# Patient Record
Sex: MALE | Race: WHITE | Employment: UNEMPLOYED | URBAN - METROPOLITAN AREA
[De-identification: names, ages, dates, MRNs, and addresses within clinical notes are randomized per-mention and may not be internally consistent; named-entity substitution may affect disease eponyms.]

---

## 2017-01-02 DIAGNOSIS — F41.1 GAD (GENERALIZED ANXIETY DISORDER): Primary | ICD-10-CM

## 2017-01-02 NOTE — Clinical Note
[facility]  [address] [facility]  [place] [address]  [phone]      January 3, 2017      Luisito Johnston  [address] [place] [id number]        Luisito     Your medication has been approved for FLUoxetine .    However, you are due for a follow up appointment for further refills. It has been over a year since we have seen you.    Please schedule this visit at your earliest convenience.    The [place] Team

## 2017-01-02 NOTE — TELEPHONE ENCOUNTER
Routing refill request to provider for review/approval because:  Drea given x1 and patient did not follow up, please advise  Patient needs to be seen because:  Needs to establish care.

## 2017-01-02 NOTE — TELEPHONE ENCOUNTER
Fluoxetine     Last Written Prescription Date: 12/5/16  Last Fill Quantity: 30, # refills: 0  [place] Office Visit with [facility] [place] provider:  11/4/15        Last PHQ-9 score on record=   PHQ-9 SCORE 10/17/2014   Total Score 0

## 2017-01-11 ENCOUNTER — OFFICE VISIT (OUTPATIENT)
Dept: FAMILY MEDICINE | Facility: CLINIC | Age: 52
End: 2017-01-11
Payer: COMMERCIAL

## 2017-01-11 VITALS
SYSTOLIC BLOOD PRESSURE: 124 MMHG | WEIGHT: 226.4 LBS | HEART RATE: 80 BPM | HEIGHT: 70 IN | DIASTOLIC BLOOD PRESSURE: 83 MMHG | TEMPERATURE: 98.5 F | BODY MASS INDEX: 32.41 KG/M2

## 2017-01-11 DIAGNOSIS — F41.1 GAD (GENERALIZED ANXIETY DISORDER): Primary | ICD-10-CM

## 2017-01-11 DIAGNOSIS — Z23 NEED FOR PROPHYLACTIC VACCINATION AND INOCULATION AGAINST INFLUENZA: ICD-10-CM

## 2017-01-11 DIAGNOSIS — Z11.59 NEED FOR HEPATITIS C SCREENING TEST: ICD-10-CM

## 2017-01-11 PROCEDURE — 99213 OFFICE O/P EST LOW 20 MIN: CPT | Mod: 25 | Performed by: FAMILY MEDICINE

## 2017-01-11 PROCEDURE — 36415 COLL VENOUS BLD VENIPUNCTURE: CPT | Performed by: FAMILY MEDICINE

## 2017-01-11 PROCEDURE — 86803 HEPATITIS C AB TEST: CPT | Performed by: FAMILY MEDICINE

## 2017-01-11 PROCEDURE — 90686 IIV4 VACC NO PRSV 0.5 ML IM: CPT | Performed by: FAMILY MEDICINE

## 2017-01-11 PROCEDURE — 90471 IMMUNIZATION ADMIN: CPT | Performed by: FAMILY MEDICINE

## 2017-01-11 ASSESSMENT — ANXIETY QUESTIONNAIRES
GAD7 TOTAL SCORE: 3
3. WORRYING TOO MUCH ABOUT DIFFERENT THINGS: SEVERAL DAYS
5. BEING SO RESTLESS THAT IT IS HARD TO SIT STILL: NOT AT ALL
6. BECOMING EASILY ANNOYED OR IRRITABLE: SEVERAL DAYS
IF YOU CHECKED OFF ANY PROBLEMS ON THIS QUESTIONNAIRE, HOW DIFFICULT HAVE THESE PROBLEMS MADE IT FOR YOU TO DO YOUR WORK, TAKE CARE OF THINGS AT HOME, OR GET ALONG WITH OTHER PEOPLE: NOT DIFFICULT AT ALL
2. NOT BEING ABLE TO STOP OR CONTROL WORRYING: NOT AT ALL
7. FEELING AFRAID AS IF SOMETHING AWFUL MIGHT HAPPEN: NOT AT ALL
1. FEELING NERVOUS, ANXIOUS, OR ON EDGE: SEVERAL DAYS

## 2017-01-11 ASSESSMENT — PATIENT HEALTH QUESTIONNAIRE - PHQ9: 5. POOR APPETITE OR OVEREATING: NOT AT ALL

## 2017-01-11 NOTE — PROGRESS NOTES
"SUBJECTIVE:  SUBJECTIVE:  51 year old.The patient has a history of  follow-up of depressive symptoms.    Since last visit the patient has doing well.  Notes from that visit reviewed. PHQ-9 has been reviewed.  Current symptoms include: Anxiety   Symptoms that have subjectively improved include: Anxiety  Previous and current treatment modalities employed include: Medications   Prozac (Fluoxetine)  Patient Active Problem List   Diagnosis     CARDIOVASCULAR SCREENING; LDL GOAL LESS THAN 160     Generalized anxiety disorder     Generalized anxiety disorder      Reviewed health maintenance  OBJECTIVE:  no apparent distress  /83 mmHg  Pulse 80  Temp(Src) 98.5  F (36.9  C) (Oral)  Ht 5' 10\" (1.778 m)  Wt 226 lb 6.4 oz (102.694 kg)  BMI 32.48 kg/m2       MENTAL STATUS EXAM:   1. Clinical observations:Patient was clean and was adequately groomed. Patient's emotional presentation was cooperative and sima/open. Patient spoke clearly and articulately. Patient maintained adequate eye contact and was cooperative in answering questions.  2. Patient appeared to be well-oriented in all spheres with coherent, logical, goal directed and relevent thinking.  3. Thought content: Denies auditory and visual hallucinations or delusions.  4. Affect and mood: Affect is alert and her emotional attitude is described as normal.  5. Sensorium and cognition: Patient was in contact with reality and oriented to place, time, person and situation. patient demonstrated no impairment in immediate, recent, or remote memory. patient's insight was adequate without obvious deficits in intelligence.    ICD-10-CM    1. MELIDA (generalized anxiety disorder) F41.1 FLUoxetine (PROZAC) 20 MG capsule   2. Need for prophylactic vaccination and inoculation against influenza Z23 FLU VAC, SPLIT VIRUS IM > 3 YO (QUADRIVALENT) [67491]     Vaccine Administration, Initial [13280]    PLAN:Follow up in 6 months     Join My Chart and in 6 months  Need PHQ-9 in 6 " months and if less than 5 then refill medication    :

## 2017-01-11 NOTE — PROGRESS NOTES
Injectable Influenza Immunization Documentation    1.  Is the person to be vaccinated sick today?  No    2. Does the person to be vaccinated have an allergy to eggs or to a component of the vaccine?  No    3. Has the person to be vaccinated today ever had a serious reaction to influenza vaccine in the past?  No    4. Has the person to be vaccinated ever had Guillain-[place] syndrome?  No     Form completed by Slade Aragon, [facility]

## 2017-01-11 NOTE — Clinical Note
[facility]  [address] [facility] [address] [phone]        January 12, 2017    Luisito Johnston  [facility] [place] [phone]            Dear Luisito,    The results of your recent tests were normal.  Below is a copy of the results.  It was a pleasure to see you at your last appointment.    If you have any questions or concerns, please call myself or my nurse at [phone].    Sincerely,    Red Trejo MD/ayaka      Results for orders placed or performed in visit on 01/11/17   Hepatitis C Screen Reflex to HCV RNA Quant and Genotype   Result Value Ref Range    Hepatitis C Antibody  NR     Nonreactive   Assay performance characteristics have not been established for newborns,   infants, and children

## 2017-01-11 NOTE — NURSING NOTE
"Chief Complaint   Patient presents with     RECHECK     Anxiety       Initial /83 mmHg  Pulse 80  Temp(Src) 98.5  F (36.9  C) (Oral)  Ht 5' 10\" (1.778 m)  Wt 226 lb 6.4 oz (102.694 kg)  BMI 32.48 kg/m2 Estimated body mass index is 32.48 kg/(m^2) as calculated from the following:    Height as of this encounter: 5' 10\" (1.778 m).    Weight as of this encounter: 226 lb 6.4 oz (102.694 kg).  BP completed using cuff size: mildays Aragon CMA     "

## 2017-01-12 LAB — HCV AB SERPL QL IA: NORMAL

## 2017-01-12 ASSESSMENT — ANXIETY QUESTIONNAIRES: GAD7 TOTAL SCORE: 3

## 2017-01-12 ASSESSMENT — PATIENT HEALTH QUESTIONNAIRE - PHQ9: SUM OF ALL RESPONSES TO PHQ QUESTIONS 1-9: 4

## 2017-05-16 ENCOUNTER — RADIANT APPOINTMENT (OUTPATIENT)
Dept: GENERAL RADIOLOGY | Facility: CLINIC | Age: 52
End: 2017-05-16
Attending: ORTHOPAEDIC SURGERY
Payer: COMMERCIAL

## 2017-05-16 ENCOUNTER — OFFICE VISIT (OUTPATIENT)
Dept: ORTHOPEDICS | Facility: CLINIC | Age: 52
End: 2017-05-16
Payer: COMMERCIAL

## 2017-05-16 VITALS
BODY MASS INDEX: 31.94 KG/M2 | SYSTOLIC BLOOD PRESSURE: 123 MMHG | HEART RATE: 93 BPM | OXYGEN SATURATION: 94 % | WEIGHT: 222.6 LBS | DIASTOLIC BLOOD PRESSURE: 80 MMHG

## 2017-05-16 DIAGNOSIS — S83.241A TEAR OF MEDIAL MENISCUS OF RIGHT KNEE, UNSPECIFIED TEAR TYPE, UNSPECIFIED WHETHER OLD OR CURRENT TEAR, INITIAL ENCOUNTER: ICD-10-CM

## 2017-05-16 DIAGNOSIS — M25.561 RIGHT MEDIAL KNEE PAIN: ICD-10-CM

## 2017-05-16 DIAGNOSIS — M25.561 RIGHT MEDIAL KNEE PAIN: Primary | ICD-10-CM

## 2017-05-16 PROCEDURE — 99203 OFFICE O/P NEW LOW 30 MIN: CPT | Performed by: ORTHOPAEDIC SURGERY

## 2017-05-16 PROCEDURE — 73562 X-RAY EXAM OF KNEE 3: CPT | Mod: RT

## 2017-05-16 NOTE — MR AVS SNAPSHOT
After Visit Summary   5/16/2017    Luisito Johnston    MRN: [id number]           Patient Information     Date Of Birth          1965        Visit Information        Provider Department      5/16/2017 10:45 AM [facility]        Today's Diagnoses     Right medial knee pain    -  1    Tear of medial meniscus of right knee, unspecified tear type, unspecified whether old or current tear, initial encounter          Care Instructions    Please remember to call and schedule a follow up appointment if one was recommended at your earliest convenience.   Orthopedics CLINIC HOURS TELEPHONE NUMBER   Jim Lemon M.D.      Treri Danette,  LPN Tuesday 8 am -5 pm    1st & 3rd Wednesday  1-4pm [place] 2nd & 4th Wednesday  8 am -11 pm / [place]  1-4pm / [place] Thursday 8 am -5 pm   Specialty schedulers:   [phone] to make an appointment with any Specialty Provider.   Main Clinic:   [phone] to make an appointment with your primary provider   Urgent Care locations:    [facility] Monday-Friday 5 pm - 9 pm  Saturday-Sunday 9 am -5pm      Monday-Friday 11 am - 9 pm  Saturday 9 am - 5 pm [phone]     If SURGERY has been recommended, please call our Specialty Schedulers at [phone] to schedule your procedure.    If you need a medication refill, please contact your pharmacy. Please allow 3 business days for your refill to be completed.  Use [email or web address] (secure e-mail communication and access to your chart) to send a message or to make an appointment. Please ask how you can sign up for [email or web address].          Follow-ups after your visit        Follow-up notes from your care team     Return if symptoms worsen or fail to improve.      Who to contact     If you have questions or need follow up information about today's clinic visit or your schedule please contact [facility] directly at [phone].  Normal or  "non-critical lab and imaging results will be communicated to you by MyChart, letter or phone within 4 business days after the clinic has received the results. If you do not hear from us within 7 days, please contact the clinic through [employer] or phone. If you have a critical or abnormal lab result, we will notify you by phone as soon as possible.  Submit refill requests through "[employer]" or call your pharmacy and they will forward the refill request to us. Please allow 3 business days for your refill to be completed.          Additional Information About Your Visit        [facility] Information     "[employer]" lets you send messages to your doctor, view your test results, renew your prescriptions, schedule appointments and more. To sign up, go to [email or web address]" . Click on \"Log in\" on the left side of the screen, which will take you to the Welcome page. Then click on \"Sign up Now\" on the right side of the page.     You will be asked to enter the access code listed below, as well as some personal information. Please follow the directions to create your username and password.     Your access code is: [email or web address]  Expires: 2017 12:57 PM     Your access code will  in 90 days. If you need help or a new code, please call your [place] clinic or [phone].        Care EveryWhere ID     This is your Care EveryWhere ID. This could be used by other organizations to access your [place] medical records  [id number]        Your Vitals Were     Pulse Pulse Oximetry BMI (Body Mass Index)             93 94% 31.94 kg/m2          Blood Pressure from Last 3 Encounters:   17 123/80   17 124/83   11/04/15 108/72    Weight from Last 3 Encounters:   17 101 kg (222 lb 9.6 oz)   17 102.7 kg (226 lb 6.4 oz)   11/04/15 97.2 kg (214 lb 3.2 oz)               Primary Care Provider Office Phone # Fax #    [facility] [phone] [address] [facility] [address]      "   Thank you!     Thank you for choosing [facility] [place]  for your care. Our goal is always to provide you with excellent care. Hearing back from our patients is one way we can continue to improve our services. Please take a few minutes to complete the written survey that you may receive in the mail after your visit with us. Thank you!             Your Updated Medication List - Protect others around you: Learn how to safely use, store and throw away your medicines at www.disposemymeds.org.          This list is accurate as of: 5/16/17 12:57 PM.  Always use your most recent med list.                   Brand Name Dispense Instructions for use    FLUoxetine 20 MG capsule    PROzac    90 capsule    Take 1 capsule (20 mg) by mouth daily

## 2017-05-16 NOTE — PATIENT INSTRUCTIONS
Please remember to call and schedule a follow up appointment if one was recommended at your earliest convenience.   Orthopedics CLINIC HOURS TELEPHONE NUMBER   Jim Lemon M.D.      Terri Samaniego,  LPN Tuesday 8 am -5 pm    1st & 3rd Wednesday  1-4pm [place] 2nd & 4th Wednesday  8 am -11 pm / [place]  1-4pm / [place] Thursday 8 am -5 pm   Specialty schedulers:   [phone] to make an appointment with any Specialty Provider.   Main Clinic:   [phone] to make an appointment with your primary provider   Urgent Care locations:    [facility] Monday-Friday 5 pm - 9 pm  Saturday-Sunday 9 am -5pm      Monday-Friday 11 am - 9 pm  Saturday 9 am - 5 pm [phone]     If SURGERY has been recommended, please call our Specialty Schedulers at [phone] to schedule your procedure.    If you need a medication refill, please contact your pharmacy. Please allow 3 business days for your refill to be completed.  Use [employer] (secure e-mail communication and access to your chart) to send a message or to make an appointment. Please ask how you can sign up for [employer].

## 2017-05-16 NOTE — PROGRESS NOTES
HISTORY OF PRESENT ILLNESS    Luisito Johnston is a 51 year old male who is seen as self referral for evaluation of right knee pain that has been present approximately 3 weeks. No known injury. [place] he had medial knee and calf pains. 7-10 days ago, symptoms worsened. 3 days ago he had great difficulty moving the knee. After resting, the knee has been gradually improving.     Present symptoms: swelling, pain, tingling at the top of the toes. No catching or locking or giving way.    Treatments tried to this point: Rest and Aleve which seemed to have help resolve the calf pains.     Orthopedic PMH: Shoulder arthroscopy, part acromioplasty (2000).    Other PMH:  has a past medical history of Chronic sinusitis; Hyperlipidemia; Increased BMI; and Psoriasis.    Surgical:  has a past surgical history that includes surgical history of - (1991); SHLDR ARTHROSCOP,PART ACROMIOPLAS (2000); shoulder surgery; ENT surgery; Colonoscopy (N/A, 3/9/2015); and Colonoscopy with CO2 insufflation (N/A, 3/9/2015).    Family Hx:  family history includes DIABETES in his father; Respiratory in his mother.    Social Hx:  reports that he has never smoked. He has never used smokeless tobacco. He reports that he drinks alcohol. He reports that he does not use illicit drugs.    REVIEW OF SYSTEMS:    CONSTITUTIONAL:  NEGATIVE for fever, chills, change in weight  INTEGUMENTARY/SKIN:  NEGATIVE for worrisome rashes, moles or lesions  EYES:  NEGATIVE for vision changes or irritation  ENT/MOUTH:  NEGATIVE for ear, mouth and throat problems  RESP:  NEGATIVE for significant cough or SOB  BREAST:  NEGATIVE for masses, tenderness or discharge  CV:  NEGATIVE for chest pain, palpitations or peripheral edema  GI:  NEGATIVE for nausea, abdominal pain, heartburn, or change in bowel habits  :  Negative   MUSCULOSKELETAL:  See HPI above  NEURO:  NEGATIVE for weakness, dizziness or paresthesias  ENDOCRINE:   NEGATIVE for temperature intolerance, skin/hair changes  HEME/ALLERGY/IMMUNE:  NEGATIVE for bleeding problems  PSYCHIATRIC:  NEGATIVE for changes in mood or affect    PHYSICAL EXAM:  /80 (BP Location: Left arm, Patient Position: Chair, Cuff Size: Adult Regular)  Pulse 93  Wt 101 kg (222 lb 9.6 oz)  SpO2 94%  BMI 31.94 kg/m2  GENERAL APPEARANCE: healthy, alert and no distress   SKIN: no suspicious lesions or rashes  NEURO: Normal strength and tone, mentation intact and speech normal.   Sensation: diminished in plantar foot.   VASCULAR: good pulses, and cappillary refill   LYMPH: no lymphadenopathy   PSYCH:  mentation appears normal and affect normal/bright    KNEE EXAM:   Gait: walks with normal gait  Alignment: normal   Squat: 50 % limited by pain.    ROM: 0-120* degrees   Effusion: moderate  Tender: medial joint line, medial facet of the patella and lateral facet of the patella  Ligaments: Lachman's Stable, Anterior and posterior drawer stable, stable to varus and valgus stress. No pain with Varus/Valgus stress testing.    McMurrays: negative  Patellofemoral joint: no crepitations in the patellofemoral joint.    No calf tightness  No definite popliteal prominence   No pain with hip rotation     X-RAY: Obtained today of the RIGHT KNEE: 3-views, reviewed in the office with the patient by myself today and show some mild subchondral sclerosis in the medial compartment. There is a tiny subchondral cyst on the most medial aspect of the tibia.     Impression:   1. Possible medial meniscus tear   2. Possible popliteal cyst causing the calf discomfort   3. Minimal osteoarthritis right knee    Plan:    I discussed the findings and diagnosis with the patient. We talked about the treatment options, including corticosteroid injection. We also talked about further diagnostic imaging which would include MRI, which would be my first choice. All questions were answered. The patient understands and has declined an MRI scan  of the right knee at this time. He wishes to wait a few weeks before considering an MRI. He was instructed to call if/ when he would like to proceed with an MRI.   Antiinflammatories, ice, wrap, and elevate as needed.     Return to clinic PRN.      ROMAN Lemon MD  Dept. Orthopedic Surgery  [facility]     This document serves as a record of the services and decisions personally performed and made by Dr. ROMAN Lemon MD. It was created on his behalf by Rose Bullock, a trained medical scribe. The creation of this record is based on the provider's personal observations and the statements of the patient. This document has been checked and approved by the attending provider.   Rose Bullock May 16, 2017 11:39 AM

## 2017-06-16 ENCOUNTER — RADIANT APPOINTMENT (OUTPATIENT)
Dept: MRI IMAGING | Facility: CLINIC | Age: 52
End: 2017-06-16
Attending: ORTHOPAEDIC SURGERY
Payer: COMMERCIAL

## 2017-06-16 DIAGNOSIS — S83.241A TEAR OF MEDIAL MENISCUS OF RIGHT KNEE, UNSPECIFIED TEAR TYPE, UNSPECIFIED WHETHER OLD OR CURRENT TEAR, INITIAL ENCOUNTER: ICD-10-CM

## 2017-06-16 PROCEDURE — 73721 MRI JNT OF LWR EXTRE W/O DYE: CPT | Mod: TC

## 2017-06-17 ENCOUNTER — RADIANT APPOINTMENT (OUTPATIENT)
Dept: GENERAL RADIOLOGY | Facility: CLINIC | Age: 52
End: 2017-06-17
Attending: FAMILY MEDICINE
Payer: COMMERCIAL

## 2017-06-17 ENCOUNTER — OFFICE VISIT (OUTPATIENT)
Dept: URGENT CARE | Facility: URGENT CARE | Age: 52
End: 2017-06-17
Payer: COMMERCIAL

## 2017-06-17 VITALS
SYSTOLIC BLOOD PRESSURE: 115 MMHG | HEART RATE: 106 BPM | BODY MASS INDEX: 30.56 KG/M2 | DIASTOLIC BLOOD PRESSURE: 78 MMHG | WEIGHT: 213 LBS | TEMPERATURE: 100.6 F | OXYGEN SATURATION: 100 %

## 2017-06-17 DIAGNOSIS — J18.9 CAP (COMMUNITY ACQUIRED PNEUMONIA): ICD-10-CM

## 2017-06-17 DIAGNOSIS — R50.9 FEVER, UNSPECIFIED: ICD-10-CM

## 2017-06-17 DIAGNOSIS — R50.9 FEVER, UNSPECIFIED: Primary | ICD-10-CM

## 2017-06-17 LAB
DEPRECATED S PYO AG THROAT QL EIA: NORMAL
ERYTHROCYTE [DISTWIDTH] IN BLOOD BY AUTOMATED COUNT: 13.5 % (ref 10–15)
HCT VFR BLD AUTO: 45.4 % (ref 40–53)
HGB BLD-MCNC: 14.8 G/DL (ref 13.3–17.7)
MCH RBC QN AUTO: 31 PG (ref 26.5–33)
MCHC RBC AUTO-ENTMCNC: 32.6 G/DL (ref 31.5–36.5)
MCV RBC AUTO: 95 FL (ref 78–100)
MICRO REPORT STATUS: NORMAL
PLATELET # BLD AUTO: 219 10E9/L (ref 150–450)
RBC # BLD AUTO: 4.77 10E12/L (ref 4.4–5.9)
SPECIMEN SOURCE: NORMAL
WBC # BLD AUTO: 11.8 10E9/L (ref 4–11)

## 2017-06-17 PROCEDURE — 85027 COMPLETE CBC AUTOMATED: CPT | Performed by: FAMILY MEDICINE

## 2017-06-17 PROCEDURE — 87081 CULTURE SCREEN ONLY: CPT | Performed by: FAMILY MEDICINE

## 2017-06-17 PROCEDURE — 87880 STREP A ASSAY W/OPTIC: CPT | Performed by: FAMILY MEDICINE

## 2017-06-17 PROCEDURE — 71020 XR CHEST 2 VW: CPT

## 2017-06-17 PROCEDURE — 99214 OFFICE O/P EST MOD 30 MIN: CPT | Performed by: FAMILY MEDICINE

## 2017-06-17 PROCEDURE — 36415 COLL VENOUS BLD VENIPUNCTURE: CPT | Performed by: FAMILY MEDICINE

## 2017-06-17 RX ORDER — BENZONATATE 100 MG/1
100 CAPSULE ORAL 3 TIMES DAILY PRN
Qty: 42 CAPSULE | Refills: 0 | Status: SHIPPED | OUTPATIENT
Start: 2017-06-17 | End: 2017-06-29

## 2017-06-17 RX ORDER — PREDNISONE 20 MG/1
20 TABLET ORAL 2 TIMES DAILY
Qty: 10 TABLET | Refills: 0 | Status: SHIPPED | OUTPATIENT
Start: 2017-06-17 | End: 2017-06-29

## 2017-06-17 RX ORDER — PREDNISONE 20 MG/1
20 TABLET ORAL 2 TIMES DAILY
Qty: 10 TABLET | Refills: 0 | Status: SHIPPED | OUTPATIENT
Start: 2017-06-17 | End: 2017-06-17

## 2017-06-17 RX ORDER — LEVOFLOXACIN 750 MG/1
750 TABLET, FILM COATED ORAL DAILY
Qty: 7 TABLET | Refills: 0 | Status: SHIPPED | OUTPATIENT
Start: 2017-06-17 | End: 2017-06-17

## 2017-06-17 RX ORDER — LEVOFLOXACIN 750 MG/1
750 TABLET, FILM COATED ORAL DAILY
Qty: 7 TABLET | Refills: 0 | Status: SHIPPED | OUTPATIENT
Start: 2017-06-17 | End: 2017-06-29

## 2017-06-17 RX ORDER — BENZONATATE 100 MG/1
100 CAPSULE ORAL 3 TIMES DAILY PRN
Qty: 42 CAPSULE | Refills: 0 | Status: SHIPPED | OUTPATIENT
Start: 2017-06-17 | End: 2017-06-17

## 2017-06-17 NOTE — PROGRESS NOTES
SUBJECTIVE:                                                    Luisito Johnston is a 51 year old male who presents to clinic today for the following health issues:      RESPIRATORY SYMPTOMS      Duration: 1 week     Description  Head and nasal congestion, headache, SOB, sore throat, body aches, loss of appetite, coughing up phlegm (greenish phlegm), fever, fatigue, light headed, cold sweats     Severity: moderate    Accompanying signs and symptoms: None    History (predisposing factors):  none    Precipitating or alleviating factors: None    Therapies tried and outcome:  mucinex with no relief     Works in sales, 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  Additional history: as documented    Patient Active Problem List   Diagnosis     CARDIOVASCULAR SCREENING; LDL GOAL LESS THAN 160     Generalized anxiety disorder     Generalized anxiety disorder     Past Surgical History:   Procedure Laterality Date     COLONOSCOPY N/A 3/9/2015    Procedure: COLONOSCOPY;  Surgeon: Denys Archuleta MD;  Location: [facility]     COLONOSCOPY WITH CO2 INSUFFLATION N/A 3/9/2015    Procedure: COLONOSCOPY WITH CO2 INSUFFLATION;  Surgeon: Denys Archuleta MD;  Location: [facility] OR     ENT SURGERY      polyps from nasal passage.      HC SHLDR ARTHROSCOP,PART ACROMIOPLAS  2000     SHOULDER SURGERY       SURGICAL HISTORY OF -   1991    Chronic Lt Shoulder Dislocation       Social History   Substance Use Topics     Smoking status: Never Smoker     Smokeless tobacco: Never Used     Alcohol use Yes      Comment: very rare     Family History   Problem Relation Age of Onset     DIABETES Father      Respiratory Mother          Current Outpatient Prescriptions   Medication Sig Dispense Refill     FLUoxetine (PROZAC) 20 MG capsule Take 1 capsule (20 mg) by mouth daily 90 capsule 1     No Known Allergies  Recent Labs   Lab Test  01/29/15   1023  11/13/13   1515   LDL   153*   --    HDL  44   --    TRIG  172*   --    TSH   --   1.92      BP Readings from Last 3 Encounters:   06/17/17 115/78   05/16/17 123/80   01/11/17 124/83    Wt Readings from Last 3 Encounters:   06/17/17 213 lb (96.6 kg)   05/16/17 222 lb 9.6 oz (101 kg)   01/11/17 226 lb 6.4 oz (102.7 kg)                  Labs reviewed in EPIC    ROS:  Constitutional, HEENT, cardiovascular, pulmonary, gi and gu systems are negative, except as otherwise noted.    OBJECTIVE:                                                    /78  Pulse 106  Temp 100.6  F (38.1  C) (Tympanic)  Wt 213 lb (96.6 kg)  SpO2 100%  BMI 30.56 kg/m2  Body mass index is 30.56 kg/(m^2).  GENERAL: healthy, alert and no distress  HENT: ear canals and TM's normal, nose and mouth without ulcers or lesions  NECK: no adenopathy, no asymmetry, masses, or scars and thyroid normal to palpation  RESP: rales few right basal crackles, no wheeze auscultated   CV: tachycardia, normal S1 S2, no S3 or S4 and no murmur, click or rub  ABDOMEN: soft, nontender, no hepatosplenomegaly, no masses and bowel sounds normal  MS: no gross musculoskeletal defects noted, no edema    Results for orders placed or performed in visit on 06/17/17   CBC with platelets   Result Value Ref Range    WBC 11.8 (H) 4.0 - 11.0 10e9/L    RBC Count 4.77 4.4 - 5.9 10e12/L    Hemoglobin 14.8 13.3 - 17.7 g/dL    Hematocrit 45.4 40.0 - 53.0 %    MCV 95 78 - 100 fl    MCH 31.0 26.5 - 33.0 pg    MCHC 32.6 31.5 - 36.5 g/dL    RDW 13.5 10.0 - 15.0 %    Platelet Count 219 150 - 450 10e9/L   Rapid strep screen   Result Value Ref Range    Specimen Description Throat     Rapid Strep A Screen       NEGATIVE: No Group A streptococcal antigen detected by immunoassay, await   culture report.      Micro Report Status FINAL 06/17/2017      CXR: unremarkable        ASSESSMENT/PLAN:                                                          ICD-10-CM    1. Fever, unspecified R50.9 Rapid strep screen     CBC with  platelets     XR Chest 2 Views     Beta strep group A culture   2. CAP (community acquired pneumonia) J18.9 levofloxacin (LEVAQUIN) 750 MG tablet     predniSONE (DELTASONE) 20 MG tablet     benzonatate (TESSALON) 100 MG capsule     DISCONTINUED: levofloxacin (LEVAQUIN) 750 MG tablet     DISCONTINUED: predniSONE (DELTASONE) 20 MG tablet     DISCONTINUED: benzonatate (TESSALON) 100 MG capsule       51 year old male presents with fever, chills, productive cough and body ache. Physical exam remarkable for temp 100.6, tachycardia and right basal crackles. WBC elevated, chest x-ray normal. Symptoms are probably related to pneumonia. Levaquin, prednisone and tessalon ordered, common side effects including tendinopathy with levaquin explained. Recommended well hydration, over-the-counter analgesia and warm fluids. Written instructions/information provided. Patient understood and in agreement with the above plan. All questions are answered. Follow-up if symptoms persist or worsen.      MEDICATIONS:   Orders Placed This Encounter   Medications     DISCONTD: levofloxacin (LEVAQUIN) 750 MG tablet     Sig: Take 1 tablet (750 mg) by mouth daily     Dispense:  7 tablet     Refill:  0     DISCONTD: predniSONE (DELTASONE) 20 MG tablet     Sig: Take 1 tablet (20 mg) by mouth 2 times daily     Dispense:  10 tablet     Refill:  0     DISCONTD: benzonatate (TESSALON) 100 MG capsule     Sig: Take 1 capsule (100 mg) by mouth 3 times daily as needed for cough     Dispense:  42 capsule     Refill:  0     levofloxacin (LEVAQUIN) 750 MG tablet     Sig: Take 1 tablet (750 mg) by mouth daily     Dispense:  7 tablet     Refill:  0     predniSONE (DELTASONE) 20 MG tablet     Sig: Take 1 tablet (20 mg) by mouth 2 times daily     Dispense:  10 tablet     Refill:  0     benzonatate (TESSALON) 100 MG capsule     Sig: Take 1 capsule (100 mg) by mouth 3 times daily as needed for cough     Dispense:  42 capsule     Refill:  0     Patient Instructions      Pneumonia (Adult)  Pneumonia is an infection deep within the lungs. It is in the small air sacs (alveoli). Pneumonia may be caused by a virus or bacteria. Pneumonia caused by bacteria is usually treated with an antibiotic. Severe cases may need to be treated in the hospital. Milder cases can be treated at home. Symptoms usually start to get better during the first 2 days of treatment.    Home care  Follow these guidelines when caring for yourself at home:    Rest at home for the first 2 to 3 days, or until you feel stronger. Don t let yourself get overly tired when you go back to your activities.    Stay away from cigarette smoke - yours or other people s.    You may use acetaminophen or ibuprofen to control fever or pain, unless another medicine was prescribed. If you have chronic liver or kidney disease, talk with your healthcare provider before using these medicines. Also talk with your provider if you ve had a stomach ulcer or gastrointestinal bleeding. Don t give aspirin to anyone younger than 18 years of age who is ill with a fever. It may cause severe liver damage.    Your appetite may be poor, so a light diet is fine.    Drink 6 to 8 glasses of fluids every day to make sure you are getting enough fluids. Beverages can include water, sport drinks, sodas without caffeine, juices, tea, or soup. Fluids will help loosen secretions in the lung. This will make it easier for you to cough up the phlegm (sputum). If you also have heart or kidney disease, check with your healthcare provider before you drink extra fluids.    Take antibiotic medicine prescribed until it is all gone, even if you are feeling better after a few days.  Follow-up care  Follow up with your healthcare provider in the next 2 to 3 days, or as advised. This is to be sure the medicine is helping you get better.  If you are 65 or older, you should get a pneumococcal vaccine and a yearly flu (influenza) shot. You should also get these vaccines if  you have chronic lung disease like asthma, emphysema, or COPD. Recently, a second type of pneumonia vaccine has become available for everyone over 65 years old. This is in addition to the previous vaccine. Ask your provider about this.  When to seek medical advice  Call your healthcare provider right away if any of these occur:    You don t get better within the first 48 hours of treatment    Shortness of breath gets worse    Rapid breathing (more than 25 breaths per minute)    Coughing up blood    Chest pain gets worse with breathing    Fever of 100.4 F (38 C) or higher that doesn t get better with fever medicine    Weakness, dizziness, or fainting that gets worse    Thirst or dry mouth that gets worse    Sinus pain, headache, or a stiff neck    Chest pain not caused by coughing  Date Last Reviewed: 1/1/2017 2000-2017 [employer]. [address] [place] [address]. All rights reserved. This information is not intended as a substitute for professional medical care. Always follow your healthcare professional's instructions.        Patient Education    Levofloxacin Ophthalmic drops, solution    Levofloxacin Oral solution    Levofloxacin Oral tablet    Levofloxacin Solution for injection    Levofloxacin, Dextrose Solution for injection  Levofloxacin Oral tablet  What is this medicine?  LEVOFLOXACIN (alberto voe FLOX a sin) is a quinolone antibiotic. It is used to treat certain kinds of bacterial infections. It will not work for colds, flu, or other viral infections.  This medicine may be used for other purposes; ask your health care provider or pharmacist if you have questions.  What should I tell my health care provider before I take this medicine?  They need to know if you have any of these conditions:    cerebral disease    irregular heartbeat    kidney disease    seizure disorder    an unusual or allergic reaction to levofloxacin, other antibiotics or medicines, foods, dyes, or  preservatives    pregnant or trying to get pregnant    breast-feeding  How should I use this medicine?  Take this medicine by mouth with a full glass of water. Follow the directions on the prescription label. This medicine can be taken with or without food. Take your medicine at regular intervals. Do not take your medicine more often than directed. Do not skip doses or stop your medicine early even if you feel better. Do not stop taking except on your doctor's advice.  A special MedGuide will be given to you by the pharmacist with each prescription and refill. Be sure to read this information carefully each time.  Talk to your pediatrician regarding the use of this medicine in children. While this drug may be prescribed for children as young as 6 months for selected conditions, precautions do apply.  Overdosage: If you think you have taken too much of this medicine contact a poison control center or emergency room at once.  NOTE: This medicine is only for you. Do not share this medicine with others.  What if I miss a dose?  If you miss a dose, take it as soon as you remember. If it is almost time for your next dose, take only that dose. Do not take double or extra doses.  What may interact with this medicine?  Do not take this medicine with any of the following medications:    arsenic trioxide    chloroquine    droperidol    medicines for irregular heart rhythm like amiodarone, disopyramide, dofetilide, flecainide, quinidine, procainamide, sotalol    some medicines for depression or mental problems like phenothiazines, pimozide, and ziprasidone  This medicine may also interact with the following medications:    amoxapine    antacids    cisapride    dairy products    didanosine (ddI) buffered tablets or powder    haloperidol    multivitamins    NSAIDS, medicines for pain and inflammation, like ibuprofen or naproxen    retinoid products like tretinoin or isotretinoin    risperidone    some other antibiotics like  clarithromycin or erythromycin    sucralfate    theophylline    warfarin  This list may not describe all possible interactions. Give your health care provider a list of all the medicines, herbs, non-prescription drugs, or dietary supplements you use. Also tell them if you smoke, drink alcohol, or use illegal drugs. Some items may interact with your medicine.  What should I watch for while using this medicine?  Tell your doctor or health care professional if your symptoms do not improve or if they get worse. Drink several glasses of water a day and cut down on drinks that contain caffeine. You must not get dehydrated while taking this medicine.  You may get drowsy or dizzy. Do not drive, use machinery, or do anything that needs mental alertness until you know how this medicine affects you. Do not sit or stand up quickly, especially if you are an older patient. This reduces the risk of dizzy or fainting spells.  This medicine can make you more sensitive to the sun. Keep out of the sun. If you cannot avoid being in the sun, wear protective clothing and use a sunscreen. Do not use sun lamps or tanning beds/booths. Contact your doctor if you get a sunburn.  If you are a diabetic monitor your blood glucose carefully. If you get an unusual reading stop taking this medicine and call your doctor right away.  Do not treat diarrhea with over-the-counter products. Contact your doctor if you have diarrhea that lasts more than 2 days or if the diarrhea is severe and watery.  Avoid antacids, calcium, iron, and zinc products for 2 hours before and 2 hours after taking a dose of this medicine.  What side effects may I notice from receiving this medicine?  Side effects that you should report to your doctor or health care professional as soon as possible:    allergic reactions like skin rash or hives, swelling of the face, lips, or tongue    changes in vision    confusion, nightmares or hallucinations    difficulty  breathing    irregular heartbeat, chest pain    joint, muscle or tendon pain    pain or difficulty passing urine    persistent headache with or without blurred vision    redness, blistering, peeling or loosening of the skin, including inside the mouth    seizures    unusual pain, numbness, tingling, or weakness    vaginal irritation, discharge  Side effects that usually do not require medical attention (report to your doctor or health care professional if they continue or are bothersome):    diarrhea    dry mouth    headache    stomach upset, nausea    trouble sleeping  This list may not describe all possible side effects. Call your doctor for medical advice about side effects. You may report side effects to FDA at 0-397-XZG-0082.  Where should I keep my medicine?  Keep out of the reach of children.  Store at room temperature between 15 and 30 degrees C (59 and 86 degrees F). Keep in a tightly closed container. Throw away any unused medicine after the expiration date.  NOTE:This sheet is a summary. It may not cover all possible information. If you have questions about this medicine, talk to your doctor, pharmacist, or health care provider. Copyright  2016 [employer]        Prednisone tablets  What is this medicine?  PREDNISONE (PRED ni sone) is a corticosteroid. It is commonly used to treat inflammation of the skin, joints, lungs, and other organs. Common conditions treated include asthma, allergies, and arthritis. It is also used for other conditions, such as blood disorders and diseases of the adrenal glands.  How should I use this medicine?  Take this medicine by mouth with a glass of water. Follow the directions on the prescription label. Take this medicine with food. If you are taking this medicine once a day, take it in the morning. Do not take more medicine than you are told to take. Do not suddenly stop taking your medicine because you may develop a severe reaction. Your doctor will tell you how much  medicine to take. If your doctor wants you to stop the medicine, the dose may be slowly lowered over time to avoid any side effects.  Talk to your pediatrician regarding the use of this medicine in children. Special care may be needed.  What side effects may I notice from receiving this medicine?  Side effects that you should report to your doctor or health care professional as soon as possible:    allergic reactions like skin rash, itching or hives, swelling of the face, lips, or tongue    changes in emotions or moods    changes in vision    depressed mood    eye pain    fever or chills, cough, sore throat, pain or difficulty passing urine    increased thirst    swelling of ankles, feet  Side effects that usually do not require medical attention (report to your doctor or health care professional if they continue or are bothersome):    confusion, excitement, restlessness    headache    nausea, vomiting    skin problems, acne, thin and shiny skin    trouble sleeping    weight gain  What may interact with this medicine?  Do not take this medicine with any of the following medications:    metyrapone    mifepristone  This medicine may also interact with the following medications:    aminoglutethimide    amphotericin B    aspirin and aspirin-like medicines    barbiturates    certain medicines for diabetes, like glipizide or glyburide    cholestyramine    cholinesterase inhibitors    cyclosporine    digoxin    diuretics    ephedrine    female hormones, like estrogens and birth control pills    isoniazid    ketoconazole    NSAIDS, medicines for pain and inflammation, like ibuprofen or naproxen    phenytoin    rifampin    toxoids    vaccines    warfarin  What if I miss a dose?  If you miss a dose, take it as soon as you can. If it is almost time for your next dose, talk to your doctor or health care professional. You may need to miss a dose or take an extra dose. Do not take double or extra doses without advice.  Where  should I keep my medicine?  Keep out of the reach of children.  Store at room temperature between 15 and 30 degrees C (59 and 86 degrees F). Protect from light. Keep container tightly closed. Throw away any unused medicine after the expiration date.  What should I tell my health care provider before I take this medicine?  They need to know if you have any of these conditions:    Cushing's syndrome    diabetes    glaucoma    heart disease    high blood pressure    infection (especially a virus infection such as chickenpox, cold sores, or herpes)    kidney disease    liver disease    mental illness    myasthenia gravis    osteoporosis    seizures    stomach or intestine problems    thyroid disease    an unusual or allergic reaction to lactose, prednisone, other medicines, foods, dyes, or preservatives    pregnant or trying to get pregnant    breast-feeding  What should I watch for while using this medicine?  Visit your doctor or health care professional for regular checks on your progress. If you are taking this medicine over a prolonged period, carry an identification card with your name and address, the type and dose of your medicine, and your doctor's name and address.  This medicine may increase your risk of getting an infection. Tell your doctor or health care professional if you are around anyone with measles or chickenpox, or if you develop sores or blisters that do not heal properly.  If you are going to have surgery, tell your doctor or health care professional that you have taken this medicine within the last twelve months.  Ask your doctor or health care professional about your diet. You may need to lower the amount of salt you eat.  This medicine may affect blood sugar levels. If you have diabetes, check with your doctor or health care professional before you change your diet or the dose of your diabetic medicine.  NOTE:This sheet is a summary. It may not cover all possible information. If you have  questions about this medicine, talk to your doctor, pharmacist, or health care provider. Copyright  2017 [employer]        Patient Education    Benzonatate Oral capsule    Benzonatate Oral capsule, liquid filled  Benzonatate Oral capsule  What is this medicine?  BENZONATATE (dinorah ELIAS na gonsales) is used to treat cough.  This medicine may be used for other purposes; ask your health care provider or pharmacist if you have questions.  What should I tell my health care provider before I take this medicine?  They need to know if you have any of these conditions:    kidney or liver disease    an unusual or allergic reaction to benzonatate, anesthetics, other medicines, foods, dyes, or preservatives    pregnant or trying to get pregnant    breast-feeding  How should I use this medicine?  Take this medicine by mouth with a glass of water. Follow the directions on the prescription label. Avoid breaking, chewing, or sucking the capsule, as this can cause serious side effects. Take your medicine at regular intervals. Do not take your medicine more often than directed.  Talk to your pediatrician regarding the use of this medicine in children. While this drug may be prescribed for children as young as 10 years old for selected conditions, precautions do apply.  Overdosage: If you think you have taken too much of this medicine contact a poison control center or emergency room at once.  NOTE: This medicine is only for you. Do not share this medicine with others.  What if I miss a dose?  If you miss a dose, take it as soon as you can. If it is almost time for your next dose, take only that dose. Do not take double or extra doses.  What may interact with this medicine?  Do not take this medicine with any of the following medications:    MAOIs like Carbex, Eldepryl, Marplan, Nardil, and Parnate  This list may not describe all possible interactions. Give your health care provider a list of all the medicines, herbs, non-prescription  drugs, or dietary supplements you use. Also tell them if you smoke, drink alcohol, or use illegal drugs. Some items may interact with your medicine.  What should I watch for while using this medicine?  Tell your doctor if your symptoms do not improve or if they get worse. If you have a high fever, skin rash, or headache, see your health care professional.  You may get drowsy or dizzy. Do not drive, use machinery, or do anything that needs mental alertness until you know how this medicine affects you. Do not sit or stand up quickly, especially if you are an older patient. This reduces the risk of dizzy or fainting spells.  What side effects may I notice from receiving this medicine?  Side effects that you should report to your doctor or health care professional as soon as possible:    allergic reactions like skin rash, itching or hives, swelling of the face, lips, or tongue    breathing problems    chest pain    confusion or hallucinations    irregular heartbeat    numbness of mouth or throat    seizures  Side effects that usually do not require medical attention (report to your doctor or health care professional if they continue or are bothersome):    burning feeling in the eyes    constipation    headache    nasal congestion    stomach upset  This list may not describe all possible side effects. Call your doctor for medical advice about side effects. You may report side effects to FDA at 1-670-FDA-3270.  Where should I keep my medicine?  Keep out of the reach of children.  Store at room temperature between 15 and 30 degrees C (59 and 86 degrees F). Keep tightly closed. Protect from light and moisture. Throw away any unused medicine after the expiration date.  NOTE:This sheet is a summary. It may not cover all possible information. If you have questions about this medicine, talk to your doctor, pharmacist, or health care provider. Copyright  2016 [employer]            Jeanmarie Mejia MD  [facility]  [place]

## 2017-06-17 NOTE — PATIENT INSTRUCTIONS
Pneumonia (Adult)  Pneumonia is an infection deep within the lungs. It is in the small air sacs (alveoli). Pneumonia may be caused by a virus or bacteria. Pneumonia caused by bacteria is usually treated with an antibiotic. Severe cases may need to be treated in the hospital. Milder cases can be treated at home. Symptoms usually start to get better during the first 2 days of treatment.    Home care  Follow these guidelines when caring for yourself at home:    Rest at home for the first 2 to 3 days, or until you feel stronger. Don t let yourself get overly tired when you go back to your activities.    Stay away from cigarette smoke - yours or other people s.    You may use acetaminophen or ibuprofen to control fever or pain, unless another medicine was prescribed. If you have chronic liver or kidney disease, talk with your healthcare provider before using these medicines. Also talk with your provider if you ve had a stomach ulcer or gastrointestinal bleeding. Don t give aspirin to anyone younger than 18 years of age who is ill with a fever. It may cause severe liver damage.    Your appetite may be poor, so a light diet is fine.    Drink 6 to 8 glasses of fluids every day to make sure you are getting enough fluids. Beverages can include water, sport drinks, sodas without caffeine, juices, tea, or soup. Fluids will help loosen secretions in the lung. This will make it easier for you to cough up the phlegm (sputum). If you also have heart or kidney disease, check with your healthcare provider before you drink extra fluids.    Take antibiotic medicine prescribed until it is all gone, even if you are feeling better after a few days.  Follow-up care  Follow up with your healthcare provider in the next 2 to 3 days, or as advised. This is to be sure the medicine is helping you get better.  If you are 65 or older, you should get a pneumococcal vaccine and a yearly flu (influenza) shot. You should also get these vaccines if  you have chronic lung disease like asthma, emphysema, or COPD. Recently, a second type of pneumonia vaccine has become available for everyone over 65 years old. This is in addition to the previous vaccine. Ask your provider about this.  When to seek medical advice  Call your healthcare provider right away if any of these occur:    You don t get better within the first 48 hours of treatment    Shortness of breath gets worse    Rapid breathing (more than 25 breaths per minute)    Coughing up blood    Chest pain gets worse with breathing    Fever of 100.4 F (38 C) or higher that doesn t get better with fever medicine    Weakness, dizziness, or fainting that gets worse    Thirst or dry mouth that gets worse    Sinus pain, headache, or a stiff neck    Chest pain not caused by coughing  Date Last Reviewed: 1/1/2017 2000-2017 [employer]. [address] [place] [address]. All rights reserved. This information is not intended as a substitute for professional medical care. Always follow your healthcare professional's instructions.        Patient Education    Levofloxacin Ophthalmic drops, solution    Levofloxacin Oral solution    Levofloxacin Oral tablet    Levofloxacin Solution for injection    Levofloxacin, Dextrose Solution for injection  Levofloxacin Oral tablet  What is this medicine?  LEVOFLOXACIN (alberto voe FLOX a sin) is a quinolone antibiotic. It is used to treat certain kinds of bacterial infections. It will not work for colds, flu, or other viral infections.  This medicine may be used for other purposes; ask your health care provider or pharmacist if you have questions.  What should I tell my health care provider before I take this medicine?  They need to know if you have any of these conditions:    cerebral disease    irregular heartbeat    kidney disease    seizure disorder    an unusual or allergic reaction to levofloxacin, other antibiotics or medicines, foods, dyes, or  preservatives    pregnant or trying to get pregnant    breast-feeding  How should I use this medicine?  Take this medicine by mouth with a full glass of water. Follow the directions on the prescription label. This medicine can be taken with or without food. Take your medicine at regular intervals. Do not take your medicine more often than directed. Do not skip doses or stop your medicine early even if you feel better. Do not stop taking except on your doctor's advice.  A special MedGuide will be given to you by the pharmacist with each prescription and refill. Be sure to read this information carefully each time.  Talk to your pediatrician regarding the use of this medicine in children. While this drug may be prescribed for children as young as 6 months for selected conditions, precautions do apply.  Overdosage: If you think you have taken too much of this medicine contact a poison control center or emergency room at once.  NOTE: This medicine is only for you. Do not share this medicine with others.  What if I miss a dose?  If you miss a dose, take it as soon as you remember. If it is almost time for your next dose, take only that dose. Do not take double or extra doses.  What may interact with this medicine?  Do not take this medicine with any of the following medications:    arsenic trioxide    chloroquine    droperidol    medicines for irregular heart rhythm like amiodarone, disopyramide, dofetilide, flecainide, quinidine, procainamide, sotalol    some medicines for depression or mental problems like phenothiazines, pimozide, and ziprasidone  This medicine may also interact with the following medications:    amoxapine    antacids    cisapride    dairy products    didanosine (ddI) buffered tablets or powder    haloperidol    multivitamins    NSAIDS, medicines for pain and inflammation, like ibuprofen or naproxen    retinoid products like tretinoin or isotretinoin    risperidone    some other antibiotics like  clarithromycin or erythromycin    sucralfate    theophylline    warfarin  This list may not describe all possible interactions. Give your health care provider a list of all the medicines, herbs, non-prescription drugs, or dietary supplements you use. Also tell them if you smoke, drink alcohol, or use illegal drugs. Some items may interact with your medicine.  What should I watch for while using this medicine?  Tell your doctor or health care professional if your symptoms do not improve or if they get worse. Drink several glasses of water a day and cut down on drinks that contain caffeine. You must not get dehydrated while taking this medicine.  You may get drowsy or dizzy. Do not drive, use machinery, or do anything that needs mental alertness until you know how this medicine affects you. Do not sit or stand up quickly, especially if you are an older patient. This reduces the risk of dizzy or fainting spells.  This medicine can make you more sensitive to the sun. Keep out of the sun. If you cannot avoid being in the sun, wear protective clothing and use a sunscreen. Do not use sun lamps or tanning beds/booths. Contact your doctor if you get a sunburn.  If you are a diabetic monitor your blood glucose carefully. If you get an unusual reading stop taking this medicine and call your doctor right away.  Do not treat diarrhea with over-the-counter products. Contact your doctor if you have diarrhea that lasts more than 2 days or if the diarrhea is severe and watery.  Avoid antacids, calcium, iron, and zinc products for 2 hours before and 2 hours after taking a dose of this medicine.  What side effects may I notice from receiving this medicine?  Side effects that you should report to your doctor or health care professional as soon as possible:    allergic reactions like skin rash or hives, swelling of the face, lips, or tongue    changes in vision    confusion, nightmares or hallucinations    difficulty  breathing    irregular heartbeat, chest pain    joint, muscle or tendon pain    pain or difficulty passing urine    persistent headache with or without blurred vision    redness, blistering, peeling or loosening of the skin, including inside the mouth    seizures    unusual pain, numbness, tingling, or weakness    vaginal irritation, discharge  Side effects that usually do not require medical attention (report to your doctor or health care professional if they continue or are bothersome):    diarrhea    dry mouth    headache    stomach upset, nausea    trouble sleeping  This list may not describe all possible side effects. Call your doctor for medical advice about side effects. You may report side effects to FDA at 2-254-NBA-1929.  Where should I keep my medicine?  Keep out of the reach of children.  Store at room temperature between 15 and 30 degrees C (59 and 86 degrees F). Keep in a tightly closed container. Throw away any unused medicine after the expiration date.  NOTE:This sheet is a summary. It may not cover all possible information. If you have questions about this medicine, talk to your doctor, pharmacist, or health care provider. Copyright  2016 [employer]        Prednisone tablets  What is this medicine?  PREDNISONE (PRED ni sone) is a corticosteroid. It is commonly used to treat inflammation of the skin, joints, lungs, and other organs. Common conditions treated include asthma, allergies, and arthritis. It is also used for other conditions, such as blood disorders and diseases of the adrenal glands.  How should I use this medicine?  Take this medicine by mouth with a glass of water. Follow the directions on the prescription label. Take this medicine with food. If you are taking this medicine once a day, take it in the morning. Do not take more medicine than you are told to take. Do not suddenly stop taking your medicine because you may develop a severe reaction. Your doctor will tell you how much  medicine to take. If your doctor wants you to stop the medicine, the dose may be slowly lowered over time to avoid any side effects.  Talk to your pediatrician regarding the use of this medicine in children. Special care may be needed.  What side effects may I notice from receiving this medicine?  Side effects that you should report to your doctor or health care professional as soon as possible:    allergic reactions like skin rash, itching or hives, swelling of the face, lips, or tongue    changes in emotions or moods    changes in vision    depressed mood    eye pain    fever or chills, cough, sore throat, pain or difficulty passing urine    increased thirst    swelling of ankles, feet  Side effects that usually do not require medical attention (report to your doctor or health care professional if they continue or are bothersome):    confusion, excitement, restlessness    headache    nausea, vomiting    skin problems, acne, thin and shiny skin    trouble sleeping    weight gain  What may interact with this medicine?  Do not take this medicine with any of the following medications:    metyrapone    mifepristone  This medicine may also interact with the following medications:    aminoglutethimide    amphotericin B    aspirin and aspirin-like medicines    barbiturates    certain medicines for diabetes, like glipizide or glyburide    cholestyramine    cholinesterase inhibitors    cyclosporine    digoxin    diuretics    ephedrine    female hormones, like estrogens and birth control pills    isoniazid    ketoconazole    NSAIDS, medicines for pain and inflammation, like ibuprofen or naproxen    phenytoin    rifampin    toxoids    vaccines    warfarin  What if I miss a dose?  If you miss a dose, take it as soon as you can. If it is almost time for your next dose, talk to your doctor or health care professional. You may need to miss a dose or take an extra dose. Do not take double or extra doses without advice.  Where  should I keep my medicine?  Keep out of the reach of children.  Store at room temperature between 15 and 30 degrees C (59 and 86 degrees F). Protect from light. Keep container tightly closed. Throw away any unused medicine after the expiration date.  What should I tell my health care provider before I take this medicine?  They need to know if you have any of these conditions:    Cushing's syndrome    diabetes    glaucoma    heart disease    high blood pressure    infection (especially a virus infection such as chickenpox, cold sores, or herpes)    kidney disease    liver disease    mental illness    myasthenia gravis    osteoporosis    seizures    stomach or intestine problems    thyroid disease    an unusual or allergic reaction to lactose, prednisone, other medicines, foods, dyes, or preservatives    pregnant or trying to get pregnant    breast-feeding  What should I watch for while using this medicine?  Visit your doctor or health care professional for regular checks on your progress. If you are taking this medicine over a prolonged period, carry an identification card with your name and address, the type and dose of your medicine, and your doctor's name and address.  This medicine may increase your risk of getting an infection. Tell your doctor or health care professional if you are around anyone with measles or chickenpox, or if you develop sores or blisters that do not heal properly.  If you are going to have surgery, tell your doctor or health care professional that you have taken this medicine within the last twelve months.  Ask your doctor or health care professional about your diet. You may need to lower the amount of salt you eat.  This medicine may affect blood sugar levels. If you have diabetes, check with your doctor or health care professional before you change your diet or the dose of your diabetic medicine.  NOTE:This sheet is a summary. It may not cover all possible information. If you have  questions about this medicine, talk to your doctor, pharmacist, or health care provider. Copyright  2017 [employer]        Patient Education    Benzonatate Oral capsule    Benzonatate Oral capsule, liquid filled  Benzonatate Oral capsule  What is this medicine?  BENZONATATE (dinorah ELIAS na gonsales) is used to treat cough.  This medicine may be used for other purposes; ask your health care provider or pharmacist if you have questions.  What should I tell my health care provider before I take this medicine?  They need to know if you have any of these conditions:    kidney or liver disease    an unusual or allergic reaction to benzonatate, anesthetics, other medicines, foods, dyes, or preservatives    pregnant or trying to get pregnant    breast-feeding  How should I use this medicine?  Take this medicine by mouth with a glass of water. Follow the directions on the prescription label. Avoid breaking, chewing, or sucking the capsule, as this can cause serious side effects. Take your medicine at regular intervals. Do not take your medicine more often than directed.  Talk to your pediatrician regarding the use of this medicine in children. While this drug may be prescribed for children as young as 10 years old for selected conditions, precautions do apply.  Overdosage: If you think you have taken too much of this medicine contact a poison control center or emergency room at once.  NOTE: This medicine is only for you. Do not share this medicine with others.  What if I miss a dose?  If you miss a dose, take it as soon as you can. If it is almost time for your next dose, take only that dose. Do not take double or extra doses.  What may interact with this medicine?  Do not take this medicine with any of the following medications:    MAOIs like Carbex, Eldepryl, Marplan, Nardil, and Parnate  This list may not describe all possible interactions. Give your health care provider a list of all the medicines, herbs, non-prescription  drugs, or dietary supplements you use. Also tell them if you smoke, drink alcohol, or use illegal drugs. Some items may interact with your medicine.  What should I watch for while using this medicine?  Tell your doctor if your symptoms do not improve or if they get worse. If you have a high fever, skin rash, or headache, see your health care professional.  You may get drowsy or dizzy. Do not drive, use machinery, or do anything that needs mental alertness until you know how this medicine affects you. Do not sit or stand up quickly, especially if you are an older patient. This reduces the risk of dizzy or fainting spells.  What side effects may I notice from receiving this medicine?  Side effects that you should report to your doctor or health care professional as soon as possible:    allergic reactions like skin rash, itching or hives, swelling of the face, lips, or tongue    breathing problems    chest pain    confusion or hallucinations    irregular heartbeat    numbness of mouth or throat    seizures  Side effects that usually do not require medical attention (report to your doctor or health care professional if they continue or are bothersome):    burning feeling in the eyes    constipation    headache    nasal congestion    stomach upset  This list may not describe all possible side effects. Call your doctor for medical advice about side effects. You may report side effects to FDA at 7-101-FDA-1028.  Where should I keep my medicine?  Keep out of the reach of children.  Store at room temperature between 15 and 30 degrees C (59 and 86 degrees F). Keep tightly closed. Protect from light and moisture. Throw away any unused medicine after the expiration date.  NOTE:This sheet is a summary. It may not cover all possible information. If you have questions about this medicine, talk to your doctor, pharmacist, or health care provider. Copyright  2016 [employer]

## 2017-06-17 NOTE — MR AVS SNAPSHOT
After Visit Summary   6/17/2017    Luisito Johnston    MRN: [id number]           Patient Information     Date Of Birth          1965        Visit Information        Provider Department      6/17/2017 9:25 AM Jeanmarie Mejia MD [facility]        Today's Diagnoses     Fever, unspecified    -  1    CAP (community acquired pneumonia)          Care Instructions      Pneumonia (Adult)  Pneumonia is an infection deep within the lungs. It is in the small air sacs (alveoli). Pneumonia may be caused by a virus or bacteria. Pneumonia caused by bacteria is usually treated with an antibiotic. Severe cases may need to be treated in the hospital. Milder cases can be treated at home. Symptoms usually start to get better during the first 2 days of treatment.    Home care  Follow these guidelines when caring for yourself at home:    Rest at home for the first 2 to 3 days, or until you feel stronger. Don t let yourself get overly tired when you go back to your activities.    Stay away from cigarette smoke - yours or other people s.    You may use acetaminophen or ibuprofen to control fever or pain, unless another medicine was prescribed. If you have chronic liver or kidney disease, talk with your healthcare provider before using these medicines. Also talk with your provider if you ve had a stomach ulcer or gastrointestinal bleeding. Don t give aspirin to anyone younger than 18 years of age who is ill with a fever. It may cause severe liver damage.    Your appetite may be poor, so a light diet is fine.    Drink 6 to 8 glasses of fluids every day to make sure you are getting enough fluids. Beverages can include water, sport drinks, sodas without caffeine, juices, tea, or soup. Fluids will help loosen secretions in the lung. This will make it easier for you to cough up the phlegm (sputum). If you also have heart or kidney disease, check with your healthcare provider before you drink extra fluids.    Take  antibiotic medicine prescribed until it is all gone, even if you are feeling better after a few days.  Follow-up care  Follow up with your healthcare provider in the next 2 to 3 days, or as advised. This is to be sure the medicine is helping you get better.  If you are 65 or older, you should get a pneumococcal vaccine and a yearly flu (influenza) shot. You should also get these vaccines if you have chronic lung disease like asthma, emphysema, or COPD. Recently, a second type of pneumonia vaccine has become available for everyone over 65 years old. This is in addition to the previous vaccine. Ask your provider about this.  When to seek medical advice  Call your healthcare provider right away if any of these occur:    You don t get better within the first 48 hours of treatment    Shortness of breath gets worse    Rapid breathing (more than 25 breaths per minute)    Coughing up blood    Chest pain gets worse with breathing    Fever of 100.4 F (38 C) or higher that doesn t get better with fever medicine    Weakness, dizziness, or fainting that gets worse    Thirst or dry mouth that gets worse    Sinus pain, headache, or a stiff neck    Chest pain not caused by coughing  Date Last Reviewed: 1/1/2017 2000-2017 [employer]. [address] [place] [address]. All rights reserved. This information is not intended as a substitute for professional medical care. Always follow your healthcare professional's instructions.        Patient Education    Levofloxacin Ophthalmic drops, solution    Levofloxacin Oral solution    Levofloxacin Oral tablet    Levofloxacin Solution for injection    Levofloxacin, Dextrose Solution for injection  Levofloxacin Oral tablet  What is this medicine?  LEVOFLOXACIN (alberto voe FLOX a sin) is a quinolone antibiotic. It is used to treat certain kinds of bacterial infections. It will not work for colds, flu, or other viral infections.  This medicine may be used for other purposes;  ask your health care provider or pharmacist if you have questions.  What should I tell my health care provider before I take this medicine?  They need to know if you have any of these conditions:    cerebral disease    irregular heartbeat    kidney disease    seizure disorder    an unusual or allergic reaction to levofloxacin, other antibiotics or medicines, foods, dyes, or preservatives    pregnant or trying to get pregnant    breast-feeding  How should I use this medicine?  Take this medicine by mouth with a full glass of water. Follow the directions on the prescription label. This medicine can be taken with or without food. Take your medicine at regular intervals. Do not take your medicine more often than directed. Do not skip doses or stop your medicine early even if you feel better. Do not stop taking except on your doctor's advice.  A special MedGuide will be given to you by the pharmacist with each prescription and refill. Be sure to read this information carefully each time.  Talk to your pediatrician regarding the use of this medicine in children. While this drug may be prescribed for children as young as 6 months for selected conditions, precautions do apply.  Overdosage: If you think you have taken too much of this medicine contact a poison control center or emergency room at once.  NOTE: This medicine is only for you. Do not share this medicine with others.  What if I miss a dose?  If you miss a dose, take it as soon as you remember. If it is almost time for your next dose, take only that dose. Do not take double or extra doses.  What may interact with this medicine?  Do not take this medicine with any of the following medications:    arsenic trioxide    chloroquine    droperidol    medicines for irregular heart rhythm like amiodarone, disopyramide, dofetilide, flecainide, quinidine, procainamide, sotalol    some medicines for depression or mental problems like phenothiazines, pimozide, and  ziprasidone  This medicine may also interact with the following medications:    amoxapine    antacids    cisapride    dairy products    didanosine (ddI) buffered tablets or powder    haloperidol    multivitamins    NSAIDS, medicines for pain and inflammation, like ibuprofen or naproxen    retinoid products like tretinoin or isotretinoin    risperidone    some other antibiotics like clarithromycin or erythromycin    sucralfate    theophylline    warfarin  This list may not describe all possible interactions. Give your health care provider a list of all the medicines, herbs, non-prescription drugs, or dietary supplements you use. Also tell them if you smoke, drink alcohol, or use illegal drugs. Some items may interact with your medicine.  What should I watch for while using this medicine?  Tell your doctor or health care professional if your symptoms do not improve or if they get worse. Drink several glasses of water a day and cut down on drinks that contain caffeine. You must not get dehydrated while taking this medicine.  You may get drowsy or dizzy. Do not drive, use machinery, or do anything that needs mental alertness until you know how this medicine affects you. Do not sit or stand up quickly, especially if you are an older patient. This reduces the risk of dizzy or fainting spells.  This medicine can make you more sensitive to the sun. Keep out of the sun. If you cannot avoid being in the sun, wear protective clothing and use a sunscreen. Do not use sun lamps or tanning beds/booths. Contact your doctor if you get a sunburn.  If you are a diabetic monitor your blood glucose carefully. If you get an unusual reading stop taking this medicine and call your doctor right away.  Do not treat diarrhea with over-the-counter products. Contact your doctor if you have diarrhea that lasts more than 2 days or if the diarrhea is severe and watery.  Avoid antacids, calcium, iron, and zinc products for 2 hours before and 2  hours after taking a dose of this medicine.  What side effects may I notice from receiving this medicine?  Side effects that you should report to your doctor or health care professional as soon as possible:    allergic reactions like skin rash or hives, swelling of the face, lips, or tongue    changes in vision    confusion, nightmares or hallucinations    difficulty breathing    irregular heartbeat, chest pain    joint, muscle or tendon pain    pain or difficulty passing urine    persistent headache with or without blurred vision    redness, blistering, peeling or loosening of the skin, including inside the mouth    seizures    unusual pain, numbness, tingling, or weakness    vaginal irritation, discharge  Side effects that usually do not require medical attention (report to your doctor or health care professional if they continue or are bothersome):    diarrhea    dry mouth    headache    stomach upset, nausea    trouble sleeping  This list may not describe all possible side effects. Call your doctor for medical advice about side effects. You may report side effects to FDA at 1-157-FDA-9147.  Where should I keep my medicine?  Keep out of the reach of children.  Store at room temperature between 15 and 30 degrees C (59 and 86 degrees F). Keep in a tightly closed container. Throw away any unused medicine after the expiration date.  NOTE:This sheet is a summary. It may not cover all possible information. If you have questions about this medicine, talk to your doctor, pharmacist, or health care provider. Copyright  2016 [employer]        Prednisone tablets  What is this medicine?  PREDNISONE (PRED ni sone) is a corticosteroid. It is commonly used to treat inflammation of the skin, joints, lungs, and other organs. Common conditions treated include asthma, allergies, and arthritis. It is also used for other conditions, such as blood disorders and diseases of the adrenal glands.  How should I use this medicine?  Take  this medicine by mouth with a glass of water. Follow the directions on the prescription label. Take this medicine with food. If you are taking this medicine once a day, take it in the morning. Do not take more medicine than you are told to take. Do not suddenly stop taking your medicine because you may develop a severe reaction. Your doctor will tell you how much medicine to take. If your doctor wants you to stop the medicine, the dose may be slowly lowered over time to avoid any side effects.  Talk to your pediatrician regarding the use of this medicine in children. Special care may be needed.  What side effects may I notice from receiving this medicine?  Side effects that you should report to your doctor or health care professional as soon as possible:    allergic reactions like skin rash, itching or hives, swelling of the face, lips, or tongue    changes in emotions or moods    changes in vision    depressed mood    eye pain    fever or chills, cough, sore throat, pain or difficulty passing urine    increased thirst    swelling of ankles, feet  Side effects that usually do not require medical attention (report to your doctor or health care professional if they continue or are bothersome):    confusion, excitement, restlessness    headache    nausea, vomiting    skin problems, acne, thin and shiny skin    trouble sleeping    weight gain  What may interact with this medicine?  Do not take this medicine with any of the following medications:    metyrapone    mifepristone  This medicine may also interact with the following medications:    aminoglutethimide    amphotericin B    aspirin and aspirin-like medicines    barbiturates    certain medicines for diabetes, like glipizide or glyburide    cholestyramine    cholinesterase inhibitors    cyclosporine    digoxin    diuretics    ephedrine    female hormones, like estrogens and birth control pills    isoniazid    ketoconazole    NSAIDS, medicines for pain and  inflammation, like ibuprofen or naproxen    phenytoin    rifampin    toxoids    vaccines    warfarin  What if I miss a dose?  If you miss a dose, take it as soon as you can. If it is almost time for your next dose, talk to your doctor or health care professional. You may need to miss a dose or take an extra dose. Do not take double or extra doses without advice.  Where should I keep my medicine?  Keep out of the reach of children.  Store at room temperature between 15 and 30 degrees C (59 and 86 degrees F). Protect from light. Keep container tightly closed. Throw away any unused medicine after the expiration date.  What should I tell my health care provider before I take this medicine?  They need to know if you have any of these conditions:    Cushing's syndrome    diabetes    glaucoma    heart disease    high blood pressure    infection (especially a virus infection such as chickenpox, cold sores, or herpes)    kidney disease    liver disease    mental illness    myasthenia gravis    osteoporosis    seizures    stomach or intestine problems    thyroid disease    an unusual or allergic reaction to lactose, prednisone, other medicines, foods, dyes, or preservatives    pregnant or trying to get pregnant    breast-feeding  What should I watch for while using this medicine?  Visit your doctor or health care professional for regular checks on your progress. If you are taking this medicine over a prolonged period, carry an identification card with your name and address, the type and dose of your medicine, and your doctor's name and address.  This medicine may increase your risk of getting an infection. Tell your doctor or health care professional if you are around anyone with measles or chickenpox, or if you develop sores or blisters that do not heal properly.  If you are going to have surgery, tell your doctor or health care professional that you have taken this medicine within the last twelve months.  Ask your doctor or  health care professional about your diet. You may need to lower the amount of salt you eat.  This medicine may affect blood sugar levels. If you have diabetes, check with your doctor or health care professional before you change your diet or the dose of your diabetic medicine.  NOTE:This sheet is a summary. It may not cover all possible information. If you have questions about this medicine, talk to your doctor, pharmacist, or health care provider. Copyright  2017 [employer] Standard        Patient Education    Benzonatate Oral capsule    Benzonatate Oral capsule, liquid filled  Benzonatate Oral capsule  What is this medicine?  BENZONATATE (dinorah ELIAS na gonsales) is used to treat cough.  This medicine may be used for other purposes; ask your health care provider or pharmacist if you have questions.  What should I tell my health care provider before I take this medicine?  They need to know if you have any of these conditions:    kidney or liver disease    an unusual or allergic reaction to benzonatate, anesthetics, other medicines, foods, dyes, or preservatives    pregnant or trying to get pregnant    breast-feeding  How should I use this medicine?  Take this medicine by mouth with a glass of water. Follow the directions on the prescription label. Avoid breaking, chewing, or sucking the capsule, as this can cause serious side effects. Take your medicine at regular intervals. Do not take your medicine more often than directed.  Talk to your pediatrician regarding the use of this medicine in children. While this drug may be prescribed for children as young as 10 years old for selected conditions, precautions do apply.  Overdosage: If you think you have taken too much of this medicine contact a poison control center or emergency room at once.  NOTE: This medicine is only for you. Do not share this medicine with others.  What if I miss a dose?  If you miss a dose, take it as soon as you can. If it is almost time for your next dose,  take only that dose. Do not take double or extra doses.  What may interact with this medicine?  Do not take this medicine with any of the following medications:    MAOIs like Carbex, Eldepryl, Marplan, Nardil, and Parnate  This list may not describe all possible interactions. Give your health care provider a list of all the medicines, herbs, non-prescription drugs, or dietary supplements you use. Also tell them if you smoke, drink alcohol, or use illegal drugs. Some items may interact with your medicine.  What should I watch for while using this medicine?  Tell your doctor if your symptoms do not improve or if they get worse. If you have a high fever, skin rash, or headache, see your health care professional.  You may get drowsy or dizzy. Do not drive, use machinery, or do anything that needs mental alertness until you know how this medicine affects you. Do not sit or stand up quickly, especially if you are an older patient. This reduces the risk of dizzy or fainting spells.  What side effects may I notice from receiving this medicine?  Side effects that you should report to your doctor or health care professional as soon as possible:    allergic reactions like skin rash, itching or hives, swelling of the face, lips, or tongue    breathing problems    chest pain    confusion or hallucinations    irregular heartbeat    numbness of mouth or throat    seizures  Side effects that usually do not require medical attention (report to your doctor or health care professional if they continue or are bothersome):    burning feeling in the eyes    constipation    headache    nasal congestion    stomach upset  This list may not describe all possible side effects. Call your doctor for medical advice about side effects. You may report side effects to FDA at 1-349-FDA-7878.  Where should I keep my medicine?  Keep out of the reach of children.  Store at room temperature between 15 and 30 degrees C (59 and 86 degrees F). Keep tightly  closed. Protect from light and moisture. Throw away any unused medicine after the expiration date.  NOTE:This sheet is a summary. It may not cover all possible information. If you have questions about this medicine, talk to your doctor, pharmacist, or health care provider. Copyright  2016 Gold Standard                Follow-ups after your visit        Your next 10 appointments already scheduled     Jun 20, 2017  8:00 AM CDT   [facility] Visit with Jim Lemon MD   [facility]    [address] [address] [phone]              Who to contact     If you have questions or need follow up information about today's clinic visit or your schedule please contact [facility] directly at [phone].  Normal or non-critical lab and imaging results will be communicated to you by MyChart, letter or phone within 4 business days after the clinic has received the results. If you do not hear from us within 7 days, please contact the clinic through MyChart or phone. If you have a critical or abnormal lab result, we will notify you by phone as soon as possible.  Submit refill requests through [facility] or call your pharmacy and they will forward the refill request to us. Please allow 3 business days for your refill to be completed.          Additional Information About Your Visit        [employer] Information     [facility] gives you secure access to your electronic health record. If you see a primary care provider, you can also send messages to your care team and make appointments. If you have questions, please call your primary care clinic.  If you do not have a primary care provider, please call [phone] and they will assist you.        Care EveryWhere ID     This is your Care EveryWhere ID. This could be used by other organizations to access your [place] medical records  [id number]        Your Vitals Were     Pulse Temperature Pulse Oximetry BMI (Body  Mass Index)          106 100.6  F (38.1  C) (Tympanic) 100% 30.56 kg/m2         Blood Pressure from Last 3 Encounters:   06/17/17 115/78   05/16/17 123/80   01/11/17 124/83    Weight from Last 3 Encounters:   06/17/17 213 lb (96.6 kg)   05/16/17 222 lb 9.6 oz (101 kg)   01/11/17 226 lb 6.4 oz (102.7 kg)              We Performed the Following     Beta strep group A culture     CBC with platelets     Rapid strep screen          Today's Medication Changes          These changes are accurate as of: 6/17/17  9:59 AM.  If you have any questions, ask your nurse or doctor.               Start taking these medicines.        Dose/Directions    benzonatate 100 MG capsule   Commonly known as:  TESSALON   Used for:  CAP (community acquired pneumonia)   Started by:  Jeanmarie Mejia MD        Dose:  100 mg   Take 1 capsule (100 mg) by mouth 3 times daily as needed for cough   Quantity:  42 capsule   Refills:  0       levofloxacin 750 MG tablet   Commonly known as:  LEVAQUIN   Used for:  CAP (community acquired pneumonia)   Started by:  Jeanmarie Mejia MD        Dose:  750 mg   Take 1 tablet (750 mg) by mouth daily   Quantity:  7 tablet   Refills:  0       predniSONE 20 MG tablet   Commonly known as:  DELTASONE   Used for:  CAP (community acquired pneumonia)   Started by:  Jeanmarie Mejia MD        Dose:  20 mg   Take 1 tablet (20 mg) by mouth 2 times daily   Quantity:  10 tablet   Refills:  0            Where to get your medicines      These medications were sent to [place] [address] [facility] [place] - [address]  [address] [place] [address]     Phone:  [phone]     benzonatate 100 MG capsule    levofloxacin 750 MG tablet    predniSONE 20 MG tablet                Primary Care Provider Office Phone # Fax #    [facility] [phone] [address] [facility] [address]        Thank you!     Thank you for choosing [facility]  for your care. Our goal is always to  provide you with excellent care. Hearing back from our patients is one way we can continue to improve our services. Please take a few minutes to complete the written survey that you may receive in the mail after your visit with us. Thank you!             Your Updated Medication List - Protect others around you: Learn how to safely use, store and throw away your medicines at www.disposemymeds.org.          This list is accurate as of: 6/17/17  9:59 AM.  Always use your most recent med list.                   Brand Name Dispense Instructions for use    benzonatate 100 MG capsule    TESSALON    42 capsule    Take 1 capsule (100 mg) by mouth 3 times daily as needed for cough       FLUoxetine 20 MG capsule    PROzac    90 capsule    Take 1 capsule (20 mg) by mouth daily       levofloxacin 750 MG tablet    LEVAQUIN    7 tablet    Take 1 tablet (750 mg) by mouth daily       predniSONE 20 MG tablet    DELTASONE    10 tablet    Take 1 tablet (20 mg) by mouth 2 times daily

## 2017-06-17 NOTE — NURSING NOTE
"Chief Complaint   Patient presents with     Sick       Initial /78  Pulse 106  Temp 100.6  F (38.1  C) (Tympanic)  Wt 213 lb (96.6 kg)  SpO2 100%  BMI 30.56 kg/m2 Estimated body mass index is 30.56 kg/(m^2) as calculated from the following:    Height as of 1/11/17: 5' 10\" (1.778 m).    Weight as of this encounter: 213 lb (96.6 kg).  Medication Reconciliation: complete     FACUNDO Ceja      "

## 2017-06-18 LAB
BACTERIA SPEC CULT: NORMAL
MICRO REPORT STATUS: NORMAL
SPECIMEN SOURCE: NORMAL

## 2017-06-19 NOTE — PROGRESS NOTES
Patient returns for his 6/16/17 right knee MRI results, which showed:     1. Medial meniscal small flap tear. There is blunting at the free margin of the posterior horn/body with adjacent low signal intensity in the meniscotibial sulcus suggestive of a small displaced flap.  2. Medial femoral condyle small region of articular cartilage delamination or small articular cartilage flap adjacent to the posterior horn of the medial meniscus.  3. Small joint effusion.    Reports his calf pain has resolved. 2 weeks ago, he pivoted and felt severe pain. Knee has been clicking a lot.     Impression:    1. Medial meniscus tear  No popliteal cyst.    Plan:  Knee Arthroscopy: Discussed findings with patient. We talked about treatment options. I feel the best treatment would be knee arthroscopy. I have explained the nature of the procedure, the risks and recovery time with the patient. All questions were answered.     Total time spent was 20 minutes; with 20 minutes spent face-to-face with patient discussing test results, treatment options, and estimated recovery time.    ROMAN Lemon MD  Dept. Orthopedic Surgery  [facility]    This document serves as a record of the services and decisions personally performed and made by Dr. ROMAN Lemon MD. It was created on his behalf by Rose Bullock, a trained medical scribe. The creation of this record is based on the provider's personal observations and the statements of the patient. This document has been checked and approved by the attending provider.   Rose Bullock June 20, 2017 8:28 AM

## 2017-06-20 ENCOUNTER — OFFICE VISIT (OUTPATIENT)
Dept: ORTHOPEDICS | Facility: CLINIC | Age: 52
End: 2017-06-20
Payer: COMMERCIAL

## 2017-06-20 VITALS
SYSTOLIC BLOOD PRESSURE: 122 MMHG | WEIGHT: 216.6 LBS | DIASTOLIC BLOOD PRESSURE: 79 MMHG | BODY MASS INDEX: 31.08 KG/M2 | HEART RATE: 84 BPM | OXYGEN SATURATION: 96 % | TEMPERATURE: 96.8 F

## 2017-06-20 DIAGNOSIS — S83.241A TEAR OF MEDIAL MENISCUS OF RIGHT KNEE, UNSPECIFIED TEAR TYPE, UNSPECIFIED WHETHER OLD OR CURRENT TEAR, INITIAL ENCOUNTER: Primary | ICD-10-CM

## 2017-06-20 PROCEDURE — 99213 OFFICE O/P EST LOW 20 MIN: CPT | Performed by: ORTHOPAEDIC SURGERY

## 2017-06-20 ASSESSMENT — PAIN SCALES - GENERAL: PAINLEVEL: MILD PAIN (2)

## 2017-06-20 NOTE — MR AVS SNAPSHOT
After Visit Summary   6/20/2017    Luisito Johnston    MRN: [id number]           Patient Information     Date Of Birth          1965        Visit Information        Provider Department      6/20/2017 8:00 AM Jim Lemon MD [facility]        Today's Diagnoses     Tear of medial meniscus of right knee, unspecified tear type, unspecified whether old or current tear, initial encounter    -  1      Care Instructions    Please remember to call and schedule a follow up appointment if one was recommended at your earliest convenience.   Orthopedics CLINIC HOURS TELEPHONE NUMBER   Jim Lemon M.D.      Terri Danette,  LPN Tuesday 8 am -5 pm    1st & 3rd Wednesday  1-4pm [place] 2nd & 4th Wednesday  8 am -11 pm / [place]  1-4pm / [place] Thursday 8 am -5 pm   Specialty schedulers:   [phone] to make an appointment with any Specialty Provider.   Main Clinic:   [phone] to make an appointment with your primary provider   Urgent Care locations:    [facility] Monday-Friday 5 pm - 9 pm  Saturday-Sunday 9 am -5pm      Monday-Friday 11 am - 9 pm  Saturday 9 am - 5 pm [phone]     If SURGERY has been recommended, please call our Specialty Schedulers at [phone] to schedule your procedure.    If you need a medication refill, please contact your pharmacy. Please allow 3 business days for your refill to be completed.  Use [employer] (secure e-mail communication and access to your chart) to send a message or to make an appointment. Please ask how you can sign up for [employer].            Follow-ups after your visit        Your next 10 appointments already scheduled     Jun 29, 2017  3:15 PM CDT   [facility]    [address] [facility] [address] [phone]            Jul 27, 2017  8:00 AM CDT   Return Visit with Jim Downs  MD Ion   [facility]    [address] [facility] [address] [phone]              Who to contact     If you have questions or need follow up information about today's clinic visit or your schedule please contact [facility] directly at [phone].  Normal or non-critical lab and imaging results will be communicated to you by MyChart, letter or phone within 4 business days after the clinic has received the results. If you do not hear from us within 7 days, please contact the clinic through [employer] or phone. If you have a critical or abnormal lab result, we will notify you by phone as soon as possible.  Submit refill requests through [employer] or call your pharmacy and they will forward the refill request to us. Please allow 3 business days for your refill to be completed.          Additional Information About Your Visit        [employer] Information     [employer] gives you secure access to your electronic health record. If you see a primary care provider, you can also send messages to your care team and make appointments. If you have questions, please call your primary care clinic.  If you do not have a primary care provider, please call [phone] and they will assist you.        Care EveryWhere ID     This is your Care EveryWhere ID. This could be used by other organizations to access your [place] medical records  [id number]        Your Vitals Were     Pulse Temperature Pulse Oximetry BMI (Body Mass Index)          84 96.8  F (36  C) (Oral) 96% 31.08 kg/m2         Blood Pressure from Last 3 Encounters:   06/20/17 122/79   06/17/17 115/78   05/16/17 123/80    Weight from Last 3 Encounters:   06/20/17 98.2 kg (216 lb 9.6 oz)   06/17/17 96.6 kg (213 lb)   05/16/17 101 kg (222 lb 9.6 oz)              Today, you had the following     No orders found for display       Primary Care Provider Office Phone # Fax #    [facility] [phone]  [phone]       [address]  [facility] [address]        Thank you!     Thank you for choosing [facility]3] [place]  for your care. Our goal is always to provide you with excellent care. Hearing back from our patients is one way we can continue to improve our services. Please take a few minutes to complete the written survey that you may receive in the mail after your visit with us. Thank you!             Your Updated Medication List - Protect others around you: Learn how to safely use, store and throw away your medicines at www.disposemymeds.org.          This list is accurate as of: 6/20/17 12:41 PM.  Always use your most recent med list.                   Brand Name Dispense Instructions for use    benzonatate 100 MG capsule    TESSALON    42 capsule    Take 1 capsule (100 mg) by mouth 3 times daily as needed for cough       FLUoxetine 20 MG capsule    PROzac    90 capsule    Take 1 capsule (20 mg) by mouth daily       levofloxacin 750 MG tablet    LEVAQUIN    7 tablet    Take 1 tablet (750 mg) by mouth daily       predniSONE 20 MG tablet    DELTASONE    10 tablet    Take 1 tablet (20 mg) by mouth 2 times daily

## 2017-06-20 NOTE — PATIENT INSTRUCTIONS
Please remember to call and schedule a follow up appointment if one was recommended at your earliest convenience.   Orthopedics CLINIC HOURS TELEPHONE NUMBER   Jim Lemon M.D.      Terri Samaniego,  LPN Tuesday 8 am -5 pm    1st & 3rd Wednesday  1-4pm [place] 2nd & 4th Wednesday  8 am -11 pm / [place]  1-4pm / [place] Thursday 8 am -5 pm   Specialty schedulers:   [phone] to make an appointment with any Specialty Provider.   Main Clinic:   [phone] to make an appointment with your primary provider   Urgent Care locations:    [facility] Monday-Friday 5 pm - 9 pm  Saturday-Sunday 9 am -5pm      Monday-Friday 11 am - 9 pm  Saturday 9 am - 5 pm [phone]     If SURGERY has been recommended, please call our Specialty Schedulers at [phone] to schedule your procedure.    If you need a medication refill, please contact your pharmacy. Please allow 3 business days for your refill to be completed.  Use [employer] (secure e-mail communication and access to your chart) to send a message or to make an appointment. Please ask how you can sign up for [employer].

## 2017-06-20 NOTE — NURSING NOTE
"Chief Complaint   Patient presents with     RECHECK     MRI Results. Right knee.       Initial /79 (BP Location: Left arm, Patient Position: Sitting, Cuff Size: Adult Regular)  Pulse 84  Temp 96.8  F (36  C) (Oral)  Wt 98.2 kg (216 lb 9.6 oz)  SpO2 96%  BMI 31.08 kg/m2 Estimated body mass index is 31.08 kg/(m^2) as calculated from the following:    Height as of 1/11/17: 1.778 m (5' 10\").    Weight as of this encounter: 98.2 kg (216 lb 9.6 oz).  Medication Reconciliation: complete   Terri Samaniego LPN      "

## 2017-06-20 NOTE — LETTER
6/20/2017       RE: Luisito Johnston  [address]  [facility] [address]           Dear Colleague,    Thank you for referring your patient, Luisito Johnston, to the [facility]. Please see a copy of my visit note below.    Patient returns for his 6/16/17 right knee MRI results, which showed:     1. Medial meniscal small flap tear. There is blunting at the free margin of the posterior horn/body with adjacent low signal intensity in the meniscotibial sulcus suggestive of a small displaced flap.  2. Medial femoral condyle small region of articular cartilage delamination or small articular cartilage flap adjacent to the posterior horn of the medial meniscus.  3. Small joint effusion.    Reports his calf pain has resolved. 2 weeks ago, he pivoted and felt severe pain. Knee has been clicking a lot.     Impression:    1. Medial meniscus tear  No popliteal cyst.    Plan:  Knee Arthroscopy: Discussed findings with patient. We talked about treatment options. I feel the best treatment would be knee arthroscopy. I have explained the nature of the procedure, the risks and recovery time with the patient. All questions were answered.     Total time spent was 20 minutes; with 20 minutes spent face-to-face with patient discussing test results, treatment options, and estimated recovery time.    ROMAN Lemon MD  Dept. Orthopedic Surgery  [facility]    This document serves as a record of the services and decisions personally performed and made by Dr. ROMAN Lemon MD. It was created on his behalf by Rose Bullock, a trained medical scribe. The creation of this record is based on the provider's personal observations and the statements of the patient. This document has been checked and approved by the attending provider.   Rose Bullock June 20, 2017 8:28 AM    Again, thank you for allowing me to participate in the care of your patient.        Sincerely,              Jim Lemon MD

## 2017-06-20 NOTE — NURSING NOTE
Medical Record Number: [id number]  Luisito Johnston  YOB: 1965   Phone: [phone] (home) [phone] (work)  Primary Provider: Laurie Rucker    Diagnosis:  Medial meniscal tear knee right.    Surgeon: KENYA Lemon MD  Surgical Procedure: arthroscopic menisectomy knee right  ICD-10 Coded: S83.241A  Hospital: [facility]    In-Patient/ Out-Patient status: Outpatient  Length of surgery: 40 mn  Anesthesia: general  Implanted Cardiac Device:     Date of Surgery:  Call pt to coordinate  When post-op appointment needed:  2 weeks    Special Requests/ Equipment:: na    CPM Needed: na    Requestor:  shannon      Location:  [facility] [phone]

## 2017-06-26 NOTE — PROGRESS NOTES
[facility]  [address] [facility] [place] [address]  [phone]  Dept: [phone]    PRE-OP EVALUATION:  Today's date: 2017    Luisito Johnston (: 1965) presents for pre-operative evaluation assessment as requested by Dr. Lemon.  He requires evaluation and anesthesia risk assessment prior to undergoing surgery/procedure for treatment of Tear of medial meniscus of right knee .  Proposed procedure: Arthroscopic menisectomy right knee     Date of Surgery/ Procedure: 17  Time of Surgery/ Procedure: 7:45  Hospital/Surgical Facility: [place]   Fax number for surgical facility:    Primary Physician: [facility]  Type of Anesthesia Anticipated: General    Patient has a Health Care Directive or Living Will:  YES      Preop Questions 2017   1.  Do you have a history of heart attack, stroke, stent, bypass or surgery on an artery in the head, neck, heart or legs? No   2.  Do you ever have any pain or discomfort in your chest? No   3.  Do you have a history of  Heart Failure? No   4.   Are you troubled by shortness of breath when:  walking on a level surface, or up a slight hill, or at night? No   5.  Do you currently have a cold, bronchitis or other respiratory infection? No   6.  Do you have a cough, shortness of breath, or wheezing? YES - treated with antibiotics  and feeling better   7.  Do you sometimes get pains in the calves of your legs when you walk? No   8. Do you or anyone in your family have previous history of blood clots? No   9.  Do you or does anyone in your family have a serious bleeding problem such as prolonged bleeding following surgeries or cuts? No   10. Have you ever had problems with anemia or been told to take iron pills? No   11. Have you had any abnormal blood loss such as black, tarry or bloody stools? No   12. Have you ever had a blood transfusion? No   13. Have you or any of your relatives ever had problems with anesthesia? No   14.  Do you have sleep apnea, excessive snoring or daytime drowsiness? No   15. Do you have any prosthetic heart valves? No   16. Do you have prosthetic joints? No         HPI:                                                      Brief HPI related to upcoming procedure: right knee meniscal tear           MEDICAL HISTORY:                                                      Patient Active Problem List    Diagnosis Date Noted     Generalized anxiety disorder 05/14/2015     Priority: Medium     Diagnosis updated by automated process. Provider to review and confirm.       Generalized anxiety disorder 04/17/2015     Priority: Medium     CARDIOVASCULAR SCREENING; LDL GOAL LESS THAN 160 10/31/2010     Priority: Medium      Past Medical History:   Diagnosis Date     Arthritis     Mother and Father     Chronic sinusitis     Polyposis     Diabetes (H)     Father     Hyperlipidemia      Increased BMI      Psoriasis      Past Surgical History:   Procedure Laterality Date     COLONOSCOPY N/A 3/9/2015    Procedure: COLONOSCOPY;  Surgeon: Denys Archuleta MD;  Location: [facility] OR     COLONOSCOPY WITH CO2 INSUFFLATION N/A 3/9/2015    Procedure: COLONOSCOPY WITH CO2 INSUFFLATION;  Surgeon: Denys Archuleta MD;  Location: [facility] OR     ENT SURGERY      polyps from nasal passage.      [facility] SHLDR ARTHROSCOP,PART ACROMIOPLAS  2000     SHOULDER SURGERY       SURGICAL HISTORY OF -   1991    Chronic Lt Shoulder Dislocation     Current Outpatient Prescriptions   Medication Sig Dispense Refill     FLUoxetine (PROZAC) 20 MG capsule Take 1 capsule (20 mg) by mouth daily 90 capsule 1     OTC products: None, except as noted above    No Known Allergies   Latex Allergy: NO    Social History   Substance Use Topics     Smoking status: Never Smoker     Smokeless tobacco: Never Used     Alcohol use Yes      Comment: very rare     History   Drug Use No       REVIEW OF SYSTEMS:                                                    C: NEGATIVE for fever,  chills, change in weight  I: NEGATIVE for worrisome rashes, moles or lesions  E: NEGATIVE for vision changes or irritation  E/M: NEGATIVE for ear, mouth and throat problems  RESP:NEGATIVE for significant cough or SOB and cough as indicated abvoe  B: NEGATIVE for masses, tenderness or discharge  CV: NEGATIVE for chest pain, palpitations or peripheral edema  GI: NEGATIVE for nausea, abdominal pain, heartburn, or change in bowel habits  : NEGATIVE for frequency, dysuria, or hematuria  M: NEGATIVE for significant arthralgias or myalgia  N: NEGATIVE for weakness, dizziness or paresthesias  E: NEGATIVE for temperature intolerance, skin/hair changes  H: NEGATIVE for bleeding problems  P: NEGATIVE for changes in mood or affect    EXAM:                                                    /80  Pulse 92  Temp 97.6  F (36.4  C) (Oral)  Wt 215 lb 9.6 oz (97.8 kg)  BMI 30.94 kg/m2    GENERAL APPEARANCE: healthy, alert and no distress     EYES: EOMI,  PERRL     HENT: ear canals and TM's normal and nose and mouth without ulcers or lesions     NECK: no adenopathy, no asymmetry, masses, or scars and thyroid normal to palpation     RESP: lungs clear to auscultation - no rales, rhonchi or wheezes     CV: regular rates and rhythm, normal S1 S2, no S3 or S4 and no murmur, click or rub     ABDOMEN:  soft, nontender, no HSM or masses and bowel sounds normal     MS: extremities normal- no gross deformities noted, no evidence of inflammation in joints, FROM in all extremities.     SKIN: no suspicious lesions or rashes     NEURO: Normal strength and tone, sensory exam grossly normal, mentation intact and speech normal     PSYCH: mentation appears normal. and affect normal/bright     LYMPHATICS: No axillary, cervical, or supraclavicular nodes    DIAGNOSTICS:                                                    EKG: appears normal, NSR, normal axis, normal intervals, no acute ST/T changes c/w ischemia, no LVH by voltage criteria,  unchanged from previous tracings    Recent Labs   Lab Test  06/17/17   0945   HGB  14.8   PLT  219        IMPRESSION:                                                    Reason for surgery/procedure: right knee meniscal tear  Diagnosis/reason for consult: Anesthesia     The proposed surgical procedure is considered LOW risk.    REVISED CARDIAC RISK INDEX  The patient has the following serious cardiovascular risks for perioperative complications such as (MI, PE, VFib and 3  AV Block):  No serious cardiac risks  INTERPRETATION: 0 risks: Class I (very low risk - 0.4% complication rate)    The patient has the following additional risks for perioperative complications:  No identified additional risks      ICD-10-CM    1. Preop general physical exam Z01.818 EKG 12-lead complete w/read - Clinics   2. Tear of medial meniscus of right knee, current, unspecified tear type, initial encounter S83.241A        RECOMMENDATIONS:                                                          --Patient is to take all scheduled medications on the day of surgery EXCEPT for modifications listed below.    APPROVAL GIVEN to proceed with proposed procedure, without further diagnostic evaluation       Signed Electronically by: Red Trejo MD    Copy of this evaluation report is provided to requesting physician.    [facility] Preop Guidelines

## 2017-06-29 ENCOUNTER — OFFICE VISIT (OUTPATIENT)
Dept: FAMILY MEDICINE | Facility: CLINIC | Age: 52
End: 2017-06-29
Payer: COMMERCIAL

## 2017-06-29 VITALS
HEART RATE: 92 BPM | DIASTOLIC BLOOD PRESSURE: 80 MMHG | TEMPERATURE: 97.6 F | WEIGHT: 215.6 LBS | SYSTOLIC BLOOD PRESSURE: 120 MMHG | BODY MASS INDEX: 30.94 KG/M2

## 2017-06-29 DIAGNOSIS — Z01.818 PREOP GENERAL PHYSICAL EXAM: Primary | ICD-10-CM

## 2017-06-29 DIAGNOSIS — S83.241A TEAR OF MEDIAL MENISCUS OF RIGHT KNEE, CURRENT, UNSPECIFIED TEAR TYPE, INITIAL ENCOUNTER: ICD-10-CM

## 2017-06-29 PROCEDURE — 99214 OFFICE O/P EST MOD 30 MIN: CPT | Performed by: FAMILY MEDICINE

## 2017-06-29 PROCEDURE — 93000 ELECTROCARDIOGRAM COMPLETE: CPT | Performed by: FAMILY MEDICINE

## 2017-06-29 NOTE — MR AVS SNAPSHOT
After Visit Summary   6/29/2017    Luisito Johnston    MRN: [id number]           Patient Information     Date Of Birth          1965        Visit Information        Provider Department      6/29/2017 3:15 PM Red Trejo MD [facility]        Today's Diagnoses     Preop general physical exam    -  1    Tear of medial meniscus of right knee, current, unspecified tear type, initial encounter          Care Instructions      Before Your Surgery      Call your surgeon if there is any change in your health. This includes signs of a cold or flu (such as a sore throat, runny nose, cough, rash or fever).    Do not smoke, drink alcohol or take over the counter medicine (unless your surgeon or primary care doctor tells you to) for the 24 hours before and after surgery.    If you take prescribed drugs: Follow your doctor s orders about which medicines to take and which to stop until after surgery.    Eating and drinking prior to surgery: follow the instructions from your surgeon    Take a shower or bath the night before surgery. Use the soap your surgeon gave you to gently clean your skin. If you do not have soap from your surgeon, use your regular soap. Do not shave or scrub the surgery site.  Wear clean pajamas and have clean sheets on your bed.           Follow-ups after your visit        Your next 10 appointments already scheduled     Jul 14, 2017   Procedure with Jim Lemon MD   [facility] (--)    [address]  [facility] [address]   [phone]            Jul 27, 2017  8:00 AM CDT   Return Visit with Jim Lemon MD   [facility]    [address] [facility] [address] [phone]              Who to contact     If you have questions or need follow up information about today's clinic visit or your schedule please contact [facility] directly at [phone].  Normal or  non-critical lab and imaging results will be communicated to you by MyChart, letter or phone within 4 business days after the clinic has received the results. If you do not hear from us within 7 days, please contact the clinic through [employer] or phone. If you have a critical or abnormal lab result, we will notify you by phone as soon as possible.  Submit refill requests through [employer] or call your pharmacy and they will forward the refill request to us. Please allow 3 business days for your refill to be completed.          Additional Information About Your Visit        [employer] Information     [employer] gives you secure access to your electronic health record. If you see a primary care provider, you can also send messages to your care team and make appointments. If you have questions, please call your primary care clinic.  If you do not have a primary care provider, please call [phone] and they will assist you.        Care EveryWhere ID     This is your Care EveryWhere ID. This could be used by other organizations to access your [place] medical records  [id number]        Your Vitals Were     Pulse Temperature BMI (Body Mass Index)             92 97.6  F (36.4  C) (Oral) 30.94 kg/m2          Blood Pressure from Last 3 Encounters:   06/29/17 120/80   06/20/17 122/79   06/17/17 115/78    Weight from Last 3 Encounters:   06/29/17 215 lb 9.6 oz (97.8 kg)   06/20/17 216 lb 9.6 oz (98.2 kg)   06/17/17 213 lb (96.6 kg)              We Performed the Following     EKG 12-lead complete w/read - Clinics        Primary Care Provider Office Phone # Fax #    [facility] [phone] [address] [facility] [address]        Equal Access to Services     CASI MAHMOOD : Hadii hugh Neff, wagautamda luqadaha, qaybta kaalmada rosyada, moiz lu. So [facility] [phone].    ATENCIÓN: Si habla español, tiene a christianson disposición servicios gratuitos de  asistencia lingüística. Misty al [phone].    We comply with applicable federal civil rights laws and [place] laws. We do not discriminate on the basis of race, color, national origin, age, disability sex, sexual orientation or gender identity.            Thank you!     Thank you for [facility]. Our goal is always to provide you with excellent care. Hearing back from our patients is one way we can continue to improve our services. Please take a few minutes to complete the written survey that you may receive in the mail after your visit with us. Thank you!             Your Updated Medication List - Protect others around you: Learn how to safely use, store and throw away your medicines at www.disposemymeds.org.          This list is accurate as of: 6/29/17  3:42 PM.  Always use your most recent med list.                   Brand Name Dispense Instructions for use Diagnosis    FLUoxetine 20 MG capsule    PROzac    90 capsule    Take 1 capsule (20 mg) by mouth daily    MELIDA (generalized anxiety disorder)

## 2017-06-29 NOTE — NURSING NOTE
"Chief Complaint   Patient presents with     Pre-Op Exam       Initial /80  Pulse 92  Temp 97.6  F (36.4  C) (Oral)  Wt 215 lb 9.6 oz (97.8 kg)  BMI 30.94 kg/m2 Estimated body mass index is 30.94 kg/(m^2) as calculated from the following:    Height as of 1/11/17: 5' 10\" (1.778 m).    Weight as of this encounter: 215 lb 9.6 oz (97.8 kg).  Medication Reconciliation: complete  Slade Aragon CMA    "

## 2017-07-07 DIAGNOSIS — F41.1 GAD (GENERALIZED ANXIETY DISORDER): ICD-10-CM

## 2017-07-10 NOTE — TELEPHONE ENCOUNTER
Per 1/11/17 OV - PLAN:Follow up in 6 months      Join My Chart and in 6 months  Need PHQ-9 in 6 months and if less than 5 then refill medication       Sent patient PHQ-9 via [employer]  .Nina DOTYN, RN, CPN

## 2017-07-14 ENCOUNTER — HOSPITAL ENCOUNTER (OUTPATIENT)
Facility: AMBULATORY SURGERY CENTER | Age: 52
Discharge: HOME | End: 2017-07-14
Attending: ORTHOPAEDIC SURGERY | Admitting: ORTHOPAEDIC SURGERY
Payer: COMMERCIAL

## 2017-07-14 ENCOUNTER — SURGERY (OUTPATIENT)
Age: 52
End: 2017-07-14

## 2017-07-14 ENCOUNTER — ANESTHESIA (OUTPATIENT)
Dept: SURGERY | Facility: AMBULATORY SURGERY CENTER | Age: 52
End: 2017-07-14

## 2017-07-14 ENCOUNTER — ANESTHESIA EVENT (OUTPATIENT)
Dept: SURGERY | Facility: AMBULATORY SURGERY CENTER | Age: 52
End: 2017-07-14

## 2017-07-14 VITALS
DIASTOLIC BLOOD PRESSURE: 69 MMHG | TEMPERATURE: 98.5 F | SYSTOLIC BLOOD PRESSURE: 101 MMHG | OXYGEN SATURATION: 98 % | RESPIRATION RATE: 10 BRPM

## 2017-07-14 DIAGNOSIS — Z98.890 S/P ARTHROSCOPIC KNEE SURGERY: Primary | ICD-10-CM

## 2017-07-14 PROCEDURE — 29881 ARTHRS KNE SRG MNISECTMY M/L: CPT | Mod: RT | Performed by: ORTHOPAEDIC SURGERY

## 2017-07-14 PROCEDURE — G8916 PT W IV AB GIVEN ON TIME: HCPCS

## 2017-07-14 PROCEDURE — G8907 PT DOC NO EVENTS ON DISCHARG: HCPCS

## 2017-07-14 PROCEDURE — 29881 ARTHRS KNE SRG MNISECTMY M/L: CPT | Mod: RT

## 2017-07-14 RX ORDER — FENTANYL CITRATE 50 UG/ML
INJECTION, SOLUTION INTRAMUSCULAR; INTRAVENOUS PRN
Status: DISCONTINUED | OUTPATIENT
Start: 2017-07-14 | End: 2017-07-14

## 2017-07-14 RX ORDER — HYDROCODONE BITARTRATE AND ACETAMINOPHEN 5; 325 MG/1; MG/1
1-2 TABLET ORAL EVERY 4 HOURS PRN
Qty: 30 TABLET | Refills: 0 | Status: SHIPPED | OUTPATIENT
Start: 2017-07-14 | End: 2017-07-27

## 2017-07-14 RX ORDER — KETOROLAC TROMETHAMINE 30 MG/ML
INJECTION, SOLUTION INTRAMUSCULAR; INTRAVENOUS PRN
Status: DISCONTINUED | OUTPATIENT
Start: 2017-07-14 | End: 2017-07-14

## 2017-07-14 RX ORDER — CELECOXIB 200 MG/1
200 CAPSULE ORAL
Status: COMPLETED | OUTPATIENT
Start: 2017-07-14 | End: 2017-07-14

## 2017-07-14 RX ORDER — SODIUM CHLORIDE, SODIUM LACTATE, POTASSIUM CHLORIDE, CALCIUM CHLORIDE 600; 310; 30; 20 MG/100ML; MG/100ML; MG/100ML; MG/100ML
INJECTION, SOLUTION INTRAVENOUS CONTINUOUS
Status: DISCONTINUED | OUTPATIENT
Start: 2017-07-14 | End: 2017-07-15 | Stop reason: HOSPADM

## 2017-07-14 RX ORDER — PROPOFOL 10 MG/ML
INJECTION, EMULSION INTRAVENOUS PRN
Status: DISCONTINUED | OUTPATIENT
Start: 2017-07-14 | End: 2017-07-14

## 2017-07-14 RX ORDER — BUPIVACAINE HYDROCHLORIDE AND EPINEPHRINE 2.5; 5 MG/ML; UG/ML
INJECTION, SOLUTION INFILTRATION; PERINEURAL PRN
Status: DISCONTINUED | OUTPATIENT
Start: 2017-07-14 | End: 2017-07-14 | Stop reason: HOSPADM

## 2017-07-14 RX ORDER — ONDANSETRON 2 MG/ML
INJECTION INTRAMUSCULAR; INTRAVENOUS PRN
Status: DISCONTINUED | OUTPATIENT
Start: 2017-07-14 | End: 2017-07-14

## 2017-07-14 RX ORDER — LIDOCAINE HYDROCHLORIDE 20 MG/ML
INJECTION, SOLUTION INFILTRATION; PERINEURAL PRN
Status: DISCONTINUED | OUTPATIENT
Start: 2017-07-14 | End: 2017-07-14

## 2017-07-14 RX ORDER — CEFAZOLIN SODIUM 2 G/100ML
2 INJECTION, SOLUTION INTRAVENOUS
Status: COMPLETED | OUTPATIENT
Start: 2017-07-14 | End: 2017-07-14

## 2017-07-14 RX ORDER — HYDROCODONE BITARTRATE AND ACETAMINOPHEN 5; 325 MG/1; MG/1
1-2 TABLET ORAL
Status: COMPLETED | OUTPATIENT
Start: 2017-07-14 | End: 2017-07-14

## 2017-07-14 RX ORDER — LIDOCAINE 40 MG/G
CREAM TOPICAL
Status: DISCONTINUED | OUTPATIENT
Start: 2017-07-14 | End: 2017-07-15 | Stop reason: HOSPADM

## 2017-07-14 RX ORDER — DEXAMETHASONE SODIUM PHOSPHATE 4 MG/ML
INJECTION, SOLUTION INTRA-ARTICULAR; INTRALESIONAL; INTRAMUSCULAR; INTRAVENOUS; SOFT TISSUE PRN
Status: DISCONTINUED | OUTPATIENT
Start: 2017-07-14 | End: 2017-07-14

## 2017-07-14 RX ADMIN — SODIUM CHLORIDE, SODIUM LACTATE, POTASSIUM CHLORIDE, CALCIUM CHLORIDE: 600; 310; 30; 20 INJECTION, SOLUTION INTRAVENOUS at 07:23

## 2017-07-14 RX ADMIN — ONDANSETRON 4 MG: 2 INJECTION INTRAMUSCULAR; INTRAVENOUS at 08:20

## 2017-07-14 RX ADMIN — PROPOFOL 200 MG: 10 INJECTION, EMULSION INTRAVENOUS at 08:00

## 2017-07-14 RX ADMIN — CEFAZOLIN SODIUM 2 G: 2 INJECTION, SOLUTION INTRAVENOUS at 07:59

## 2017-07-14 RX ADMIN — KETOROLAC TROMETHAMINE 30 MG: 30 INJECTION, SOLUTION INTRAMUSCULAR; INTRAVENOUS at 08:32

## 2017-07-14 RX ADMIN — SODIUM CHLORIDE, SODIUM LACTATE, POTASSIUM CHLORIDE, CALCIUM CHLORIDE: 600; 310; 30; 20 INJECTION, SOLUTION INTRAVENOUS at 07:59

## 2017-07-14 RX ADMIN — BUPIVACAINE HYDROCHLORIDE AND EPINEPHRINE 50 ML: 2.5; 5 INJECTION, SOLUTION INFILTRATION; PERINEURAL at 08:36

## 2017-07-14 RX ADMIN — CELECOXIB 200 MG: 200 CAPSULE ORAL at 09:02

## 2017-07-14 RX ADMIN — FENTANYL CITRATE 100 MCG: 50 INJECTION, SOLUTION INTRAMUSCULAR; INTRAVENOUS at 08:00

## 2017-07-14 RX ADMIN — HYDROCODONE BITARTRATE AND ACETAMINOPHEN 1 TABLET: 5; 325 TABLET ORAL at 09:12

## 2017-07-14 RX ADMIN — LIDOCAINE HYDROCHLORIDE 40 MG: 20 INJECTION, SOLUTION INFILTRATION; PERINEURAL at 08:00

## 2017-07-14 RX ADMIN — DEXAMETHASONE SODIUM PHOSPHATE 4 MG: 4 INJECTION, SOLUTION INTRA-ARTICULAR; INTRALESIONAL; INTRAMUSCULAR; INTRAVENOUS; SOFT TISSUE at 08:21

## 2017-07-14 NOTE — ANESTHESIA POSTPROCEDURE EVALUATION
Patient: Luisito Johnston    Procedure(s):  Arthroscopic Menisectomy Right Knee - Wound Class: I-Clean    Diagnosis:Right Medial Meniscal Tear Knee   Diagnosis Additional Information: No value filed.    Anesthesia Type:  General    Note:  Anesthesia Post Evaluation    Patient location during evaluation: PACU  Patient participation: Able to fully participate in evaluation  Level of consciousness: awake  Pain management: adequate  Airway patency: patent  Cardiovascular status: acceptable  Respiratory status: acceptable  Hydration status: balanced  PONV: none     Anesthetic complications: None          Last vitals:  Vitals:    07/14/17 0900 07/14/17 0915 07/14/17 0930   BP: 106/87 110/75 101/69   Resp: 10 10 10   Temp:   36.9  C (98.5  F)   SpO2: 99% 96% 98%         Electronically Signed By: Larry Ahuja MD  July 14, 2017  4:10 PM

## 2017-07-14 NOTE — ANESTHESIA PREPROCEDURE EVALUATION
Anesthesia Evaluation     . Pt has had prior anesthetic.     No history of anesthetic complications          ROS/MED HX    ENT/Pulmonary: Comment: Chronic sinusitis      Neurologic:  - neg neurologic ROS     Cardiovascular:  - neg cardiovascular ROS       METS/Exercise Tolerance:     Hematologic:  - neg hematologic  ROS       Musculoskeletal: Comment: arthritis        GI/Hepatic:  - neg GI/hepatic ROS       Renal/Genitourinary:  - ROS Renal section negative       Endo: Comment: hyperlipidemia    (+) Obesity, .      Psychiatric:  - neg psychiatric ROS       Infectious Disease:  - neg infectious disease ROS       Malignancy:      - no malignancy   Other:    - neg other ROS                 Physical Exam  Normal systems: cardiovascular, pulmonary and dental    Airway   Mallampati: II  TM distance: >3 FB  Neck ROM: full    Dental     Cardiovascular       Pulmonary                     Anesthesia Plan      History & Physical Review  History and physical reviewed and following examination; no interval change.    ASA Status:  2 .    NPO Status:  > 8 hours    Plan for General and LMA with Intravenous induction. Maintenance will be Balanced.    PONV prophylaxis:  Ondansetron (or other 5HT-3) and Dexamethasone or Solumedrol       Postoperative Care  Postoperative pain management:  IV analgesics and Oral pain medications.      Consents  Anesthetic plan, risks, benefits and alternatives discussed with:  Patient..                          .

## 2017-07-14 NOTE — DISCHARGE INSTRUCTIONS
[facility]  Same-Day Surgery   Adult Discharge Orders & Instructions   For 24 hours after surgery  1. Get plenty of rest.  A responsible adult must stay with you for at least 24 hours after you leave the hospital.   2. Do not drive or use heavy equipment.  If you have weakness or tingling, don't drive or use heavy equipment until this feeling goes away.  3. Do not drink alcohol.  4. Avoid strenuous or risky activities.  Ask for help when climbing stairs.   5. You may feel lightheaded.  IF so, sit for a few minutes before standing.  Have someone help you get up.   6. If you have nausea (feel sick to your stomach): Drink only clear liquids such as apple juice, ginger ale, broth or 7-Up.  Rest may also help.  Be sure to drink enough fluids.  Move to a regular diet as you feel able.  7. You may have a slight fever. Call the doctor if your fever is over 100 F (37.7 C) (taken under the tongue) or lasts longer than 24 hours.  8. You may have a dry mouth, a sore throat, muscle aches or trouble sleeping.  These should go away after 24 hours.  9. Do not make important or legal decisions.   Call your doctor for any of the followin.  Signs of infection (fever, growing tenderness at the surgery site, a large amount of drainage or bleeding, severe pain, foul-smelling drainage, redness, swelling).    2. It has been over 8 to 10 hours since surgery and you are still not able to urinate (pass water).    3.  Headache for over 24 hours.    4.  Numbness, tingling or weakness the day after surgery (if you had spinal anesthesia).  To contact a Doctor: [phone]

## 2017-07-14 NOTE — OP NOTE
OPERATIVE NOTE    Date of Procedure: July 14, 2017    PRE-OP DIAGNOSIS:  Medial meniscus tear right knee     POST-OP DIAGNOSIS:  Medial meniscus tear right knee    PROCEDURE: Arthroscopic partial medial meniscectomy, right knee.    SURGEON: Ion    ASSISTANT(S):  GEORGIANA Condon    ANESTHESIA:  General    EBL: 1cc  Complications: none   Specimen: none     INDICATIONS:  Patient is a 51 year old male with right knee symptoms including pain medial and posterior with mechanical symptoms.   Exam and MRI consistent with a medial meniscus tear.  Has failed conservative treatment.  Informed consent was obtained for the procedure, The procedure had been discussed in detail with the patient, including alternatives to the proposed procedure, and expected recovery.  Risks were discussed, including, but not limited to infection, neurovascular injury, DVT/PE, failure to relieve pain, and anesthetic complications.      Surgical site was marked.    PROCEDURE IN DETAIL  The patient was taken to the operating room and placed on the  operating table in the supine position. General anesthesia was successfully achieved. Pause for site confirmation was done.  Then, a tourniquet was placed around the proximal thigh, the leg placed in the leg cash, and the limb was prepped and draped in the usual sterile fashion. The anticipated portal sites were injected with 1/4% Marcaine with epinephrine, and was also injected intra-articularly.  Standard superomedial inflow portal was established and inflow started.  Standard inferolateral scope portal was established, and the scope was introduced. Medial work portal was localized using a spinal needle.  The portal was made and probe introduced.  The tourniquet was not inflated during the case.     The following findings were noted at arthroscopy :  Medial compartment: Gr 3, with a small area of grade 4  articular cartilage changes on the posterior medial femoral condyle only, with grade 1 changes  over the majority of the medial femoral condyle.and medial compartment.   A complex posterior medial meniscus tear, with multiple unstable flaps, all in the posterior third.  There was a shallow horizontal tear deep to the flap tears. // Lateral Compartment: Gr 0  articular cartilage changes.   Normal lateral meniscus.  // Patellofemoral joint: Gr 1-2  articular cartilage changes  on the patella, Gr 0-1  articular cartilage changes on the trochlea.  ACL intact, but the bulk of the ACL proximally seemed diminished, indicating a previous partial thickness tear.    Using a combination of arthroscopic rongeurs and the motorized shaver, the unstable portions of the medial meniscus were removed, and the remaining meniscal tissue was tapered.  We removed about 50% of the posterior third's volume.  Minor chondroplasty was performed in the medial compartment(s).   The Gutters were checked for any loose bodies.      At this point, the excess fluid was suctioned from the knee, instruments were removed from the knee.  Steri strips were applied to the portal sites.  Additional 1/4% Marcaine was injected into the knee.  Sterile, compressive dressings were applied.  Estimated blood loss was negligible.  Patient was awakened, and transferred to the recovery room in stable condition.    ROMAN Lemon MD  Dept. Orthopedic Surgery  [facility]

## 2017-07-14 NOTE — IP AVS SNAPSHOT
[facility]    [address] [place] [address]    Phone:  [phone]                                       After Visit Summary   7/14/2017    Luisito Johnston    MRN: [id number]           After Visit Summary Signature Page     I have received my discharge instructions, and my questions have been answered. I have discussed any challenges I see with this plan with the nurse or doctor.    ..........................................................................................................................................  Patient/Patient Representative Signature      ..........................................................................................................................................  Patient Representative Print Name and Relationship to Patient    ..................................................               ................................................  Date                                            Time    ..........................................................................................................................................  Reviewed by Signature/Title    ...................................................              ..............................................  Date                                                            Time

## 2017-07-14 NOTE — ANESTHESIA CARE TRANSFER NOTE
Patient: Luisito Johnston    Procedure(s):  Arthroscopic Menisectomy Right Knee - Wound Class: I-Clean    Diagnosis: Right Medial Meniscal Tear Knee   Diagnosis Additional Information: No value filed.    Anesthesia Type:   General     Note:  Airway :Oral Airway  Patient transferred to:PACU  Comments: Care of Transfer report given to RN.  Spont Respirations. Responds   Easy.      Vitals: (Last set prior to Anesthesia Care Transfer)    CRNA VITALS  7/14/2017 0820 - 7/14/2017 0850      7/14/2017             Resp Rate (observed): (!)  5                Electronically Signed By: WILLIAM ZIMMER CRNA  July 14, 2017  8:50 AM

## 2017-07-14 NOTE — IP AVS SNAPSHOT
MRN:[id number]                      After Visit Summary   7/14/2017    Luisito Johnston    MRN: [id number]           Thank you!     Thank you for choosing [place] for your care. Our goal is always to provide you with excellent care. Hearing back from our patients is one way we can continue to improve our services. Please take a few minutes to complete the written survey that you may receive in the mail after you visit with us. Thank you!        Patient Information     Date Of Birth          1965        About your hospital stay     You were admitted on:  July 14, 2017 You last received care in the:  [facility]    You were discharged on:  July 14, 2017       Who to Call     For medical emergencies, please call 911.  For non-urgent questions about your medical care, please call your primary care provider or clinic, [phone]  For questions related to your surgery, please call your surgery clinic        Attending Provider     Provider Jim Grey MD Orthopedics       Primary Care Provider Office Phone # Fax #    [facility] [phone]      After Care Instructions     Discharge Instructions       Review outpatient procedure discharge instructions with patient as directed by Provider    Post Knee Arthroscopy Discharge Instructions:    -Elevate and Ice affected knee continuously x 48hrs, then 4 times daily.    -Remove dressings on post operative day #2, place light dressings. Don't remove the steri strips. Re-wrap knee with one of the ACE wraps.  No need to wrap all the way up and down the leg.  Place clean dressings on wounds daily until the wounds are completely dry x 24 hours, then they can be left uncovered.    -Keep wounds clean and dry.  May get wounds wet in shower-only starting post operative day #4.  Do not immerse the knee in water for 3 weeks.  Can immerse the knee sooner if the wounds are completely healed over and  sealed.    -Remove steri strips in 10 days, if they haven't fallen-off all ready.    -Avoid unnecessary walking over the next 48hrs    -Exercises to be done 3-4 times/day, as taught by physical therapy, nurse or surgeon pre or postoperatively.     -Weight Bearing as tolerated, unless specifically instructed otherwise.   Crutches are for comfort only, and are optional to use.    -Return to clinic in 10-14 days--call for appt with Dr. Lemon [phone]                  Your next 10 appointments already scheduled     2017  8:00 AM CDT   [facility] MD   [facility]    [address] [facility] [address] [phone]              Further instructions from your care team       [facility]  Same-Day Surgery   Adult Discharge Orders & Instructions   For 24 hours after surgery  1. Get plenty of rest.  A responsible adult must stay with you for at least 24 hours after you leave the hospital.   2. Do not drive or use heavy equipment.  If you have weakness or tingling, don't drive or use heavy equipment until this feeling goes away.  3. Do not drink alcohol.  4. Avoid strenuous or risky activities.  Ask for help when climbing stairs.   5. You may feel lightheaded.  IF so, sit for a few minutes before standing.  Have someone help you get up.   6. If you have nausea (feel sick to your stomach): Drink only clear liquids such as apple juice, ginger ale, broth or 7-Up.  Rest may also help.  Be sure to drink enough fluids.  Move to a regular diet as you feel able.  7. You may have a slight fever. Call the doctor if your fever is over 100 F (37.7 C) (taken under the tongue) or lasts longer than 24 hours.  8. You may have a dry mouth, a sore throat, muscle aches or trouble sleeping.  These should go away after 24 hours.  9. Do not make important or legal decisions.   Call your doctor for any of the followin.  Signs of  infection (fever, growing tenderness at the surgery site, a large amount of drainage or bleeding, severe pain, foul-smelling drainage, redness, swelling).    2. It has been over 8 to 10 hours since surgery and you are still not able to urinate (pass water).    3.  Headache for over 24 hours.    4.  Numbness, tingling or weakness the day after surgery (if you had spinal anesthesia).  To contact a Doctor: [phone]    Pending Results     No orders found from 7/12/2017 to 7/15/2017.            Admission Information     Date & Time Provider Department Dept. Phone    7/14/2017 Jim Lemon MD [facility] [phone]      Your Vitals Were     Blood Pressure Temperature Respirations Pulse Oximetry          117/90 97.2  F (36.2  C) (Temporal) 17 98%        MyChart Information     [employer] gives you secure access to your electronic health record. If you see a primary care provider, you can also send messages to your care team and make appointments. If you have questions, please call your primary care clinic.  If you do not have a primary care provider, please call [phone] and they will assist you.        Care EveryWhere ID     This is your Care EveryWhere ID. This could be used by other organizations to access your [place] medical records  [id number]        Equal Access to Services     TANK MAHMOOD : Hadii hugh valadezo Sotegan, waaxda luqadaha, qaybta kaalmada adeegyada, moiz lu. So [facility] [phone].    ATENCIÓN: Si habla español, tiene a christianson disposición servicios gratuitos de asistencia lingüística. Llame al [phone].    We comply with applicable federal civil rights laws and [place] laws. We do not discriminate on the basis of race, color, national origin, age, disability sex, sexual orientation or gender identity.               Review of your medicines      START taking        Dose / Directions    HYDROcodone-acetaminophen 5-325 MG per tablet    Commonly known as:  NORCO   Used for:  S/P arthroscopic knee surgery        Dose:  1-2 tablet   Take 1-2 tablets by mouth every 4 hours as needed for other (Moderate to Severe Pain)   Quantity:  30 tablet   Refills:  0         CONTINUE these medicines which have NOT CHANGED        Dose / Directions    FLUoxetine 20 MG capsule   Commonly known as:  PROzac   Used for:  MELIDA (generalized anxiety disorder)        TAKE 1 CAPSULE (20 MG) BY MOUTH DAILY   Quantity:  90 capsule   Refills:  1            Where to get your medicines      Some of these will need a paper prescription and others can be bought over the counter. Ask your nurse if you have questions.     Bring a paper prescription for each of these medications     HYDROcodone-acetaminophen 5-325 MG per tablet                Protect others around you: Learn how to safely use, store and throw away your medicines at www.disposemymeds.org.             Medication List: This is a list of all your medications and when to take them. Check marks below indicate your daily home schedule. Keep this list as a reference.      Medications           Morning Afternoon Evening Bedtime As Needed    FLUoxetine 20 MG capsule   Commonly known as:  PROzac   TAKE 1 CAPSULE (20 MG) BY MOUTH DAILY                                HYDROcodone-acetaminophen 5-325 MG per tablet   Commonly known as:  NORCO   Take 1-2 tablets by mouth every 4 hours as needed for other (Moderate to Severe Pain)

## 2017-07-26 NOTE — PROGRESS NOTES
"Follow-up right knee arthroscopic menisectomy on 7/14/17.   Findings:  Medial compartment: Gr 3, with a small area of grade 4 articular cartilage changes on the posterior medial femoral condyle only, with grade 1 changes over the majority of the medial femoral condyle and medial compartment. A complex posterior medial meniscus tear, with multiple unstable flaps, all in the posterior third. There was a shallow horizontal tear deep to the flap tears. // Lateral Compartment: Gr 0 articular cartilage changes. Normal lateral meniscus. // Patellofemoral joint: Gr 1-2 articular cartilage changes on the patella, Gr 0-1 articular cartilage changes on the trochlea. ACL intact, but the bulk of the ACL proximally seemed diminished, indicating a previous partial thickness tear.    He reports that the pain is better compared to preop. He still has some snapping. He did not experience a lot of knee pain after surgery. 2-3 days post op, his calf was stiff and painful but this resolved after a few days.      /80  Pulse 78  Temp 97.8  F (36.6  C) (Oral)  Ht 1.803 m (5' 11\")  Wt 98 kg (216 lb)  SpO2 96%  BMI 30.13 kg/m2    Exam:  Wounds are healing well. He has a minimal effusion.   ROM: 0-135 degrees    No evidence of infection.    Impression:  1. Status post right knee arthroscopic menisectomy doing well     Plan:  I reviewed the operative findings with him.     Will continue range of motion and strengthening. Scar massage.  Advance activity as tolerated.  RTC 3-4 weeks if still painful. Consider corticosteroid injection or an  brace if he continues to have problems down the road.     ROMAN Lemon MD  Dept. Orthopedic Surgery  [facility]    This document serves as a record of the services and decisions personally performed and made by Dr. ROMAN Lemon MD. It was created on his behalf by Rose Bullock, a trained medical scribe. The creation of this record is based on the provider's personal " observations and the statements of the patient. This document has been checked and approved by the attending provider.   Rose Bullock July 27, 2017 8:26 AM

## 2017-07-27 ENCOUNTER — OFFICE VISIT (OUTPATIENT)
Dept: ORTHOPEDICS | Facility: CLINIC | Age: 52
End: 2017-07-27
Payer: COMMERCIAL

## 2017-07-27 VITALS
SYSTOLIC BLOOD PRESSURE: 120 MMHG | OXYGEN SATURATION: 96 % | TEMPERATURE: 97.8 F | BODY MASS INDEX: 30.24 KG/M2 | HEART RATE: 78 BPM | DIASTOLIC BLOOD PRESSURE: 80 MMHG | WEIGHT: 216 LBS | HEIGHT: 71 IN

## 2017-07-27 DIAGNOSIS — S83.241A TEAR OF MEDIAL MENISCUS OF RIGHT KNEE, UNSPECIFIED TEAR TYPE, UNSPECIFIED WHETHER OLD OR CURRENT TEAR, INITIAL ENCOUNTER: ICD-10-CM

## 2017-07-27 DIAGNOSIS — Z98.890 S/P RIGHT KNEE ARTHROSCOPY: Primary | ICD-10-CM

## 2017-07-27 PROCEDURE — 99024 POSTOP FOLLOW-UP VISIT: CPT | Performed by: ORTHOPAEDIC SURGERY

## 2017-07-27 NOTE — MR AVS SNAPSHOT
"              After Visit Summary   7/27/2017    Luisito Johnston    MRN: [id number]           Patient Information     Date Of Birth          1965        Visit Information        Provider Department      7/27/2017 8:00 AM Jim Lemon MD [facility]        Today's Diagnoses     S/P right knee arthroscopy    -  1    Tear of medial meniscus of right knee, unspecified tear type, unspecified whether old or current tear, initial encounter           Follow-ups after your visit        Who to contact     If you have questions or need follow up information about today's clinic visit or your schedule please contact [facility] directly at [phone].  Normal or non-critical lab and imaging results will be communicated to you by MyChart, letter or phone within 4 business days after the clinic has received the results. If you do not hear from us within 7 days, please contact the clinic through [employer] or phone. If you have a critical or abnormal lab result, we will notify you by phone as soon as possible.  Submit refill requests through [employer] or call your pharmacy and they will forward the refill request to us. Please allow 3 business days for your refill to be completed.          Additional Information About Your Visit        MyChart Information     [employer] gives you secure access to your electronic health record. If you see a primary care provider, you can also send messages to your care team and make appointments. If you have questions, please call your primary care clinic.  If you do not have a primary care provider, please call [phone] and they will assist you.        Care EveryWhere ID     This is your Care EveryWhere ID. This could be used by other organizations to access your [place] medical records  [id number]        Your Vitals Were     Pulse Temperature Height Pulse Oximetry BMI (Body Mass Index)       78 97.8  F (36.6  C) (Oral) 5' 11\" (1.803 m) 96% 30.13 kg/m2     "    Blood Pressure from Last 3 Encounters:   07/27/17 120/80   07/14/17 101/69   06/29/17 120/80    Weight from Last 3 Encounters:   07/27/17 216 lb (98 kg)   06/29/17 215 lb 9.6 oz (97.8 kg)   06/20/17 216 lb 9.6 oz (98.2 kg)              Today, you had the following     No orders found for display       Primary Care Provider Office Phone # Fax #    [facility] [phone] [address] [facility] [facility] [address]        Equal Access to Services     [facility] Hospital[facility] : Hadii hugh an hadasho Sotegan, waaxda luqadaha, qaybta kaalmada adealmasyaclaudy, moiz king . So [facility] [phone].    ATENCIÓN: Si habla español, tiene a christianson disposición servicios gratuitos de asistencia lingüística. Llame al [phone].    We comply with applicable federal civil rights laws and [place] laws. We do not discriminate on the basis of race, color, national origin, age, disability sex, sexual orientation or gender identity.            Thank you!     Thank you for choosing [facility]  for your care. Our goal is always to provide you with excellent care. Hearing back from our patients is one way we can continue to improve our services. Please take a few minutes to complete the written survey that you may receive in the mail after your visit with us. Thank you!             Your Updated Medication List - Protect others around you: Learn how to safely use, store and throw away your medicines at www.disposemymeds.org.          This list is accurate as of: 7/27/17  2:48 PM.  Always use your most recent med list.                   Brand Name Dispense Instructions for use Diagnosis    FLUoxetine 20 MG capsule    PROzac    90 capsule    TAKE 1 CAPSULE (20 MG) BY MOUTH DAILY    MELIDA (generalized anxiety disorder)

## 2017-07-27 NOTE — NURSING NOTE
"Chief Complaint   Patient presents with     Surgical Followup     Knee right       Initial /80  Pulse 78  Temp 97.8  F (36.6  C) (Oral)  Ht 5' 11\" (1.803 m)  Wt 216 lb (98 kg)  SpO2 96%  BMI 30.13 kg/m2 Estimated body mass index is 30.13 kg/(m^2) as calculated from the following:    Height as of this encounter: 5' 11\" (1.803 m).    Weight as of this encounter: 216 lb (98 kg).  Medication Reconciliation: complete   Eevline Ospina CMA    "

## 2017-07-27 NOTE — LETTER
"    7/27/2017         RE: Luisito Johnston  [address] [facility] [address]        Dear Colleague,    Thank you for referring your patient, Luisito Johnston, to the [facility]. Please see a copy of my visit note below.    Follow-up right knee arthroscopic menisectomy on 7/14/17.   Findings:  Medial compartment: Gr 3, with a small area of grade 4 articular cartilage changes on the posterior medial femoral condyle only, with grade 1 changes over the majority of the medial femoral condyle and medial compartment. A complex posterior medial meniscus tear, with multiple unstable flaps, all in the posterior third. There was a shallow horizontal tear deep to the flap tears. // Lateral Compartment: Gr 0 articular cartilage changes. Normal lateral meniscus. // Patellofemoral joint: Gr 1-2 articular cartilage changes on the patella, Gr 0-1 articular cartilage changes on the trochlea. ACL intact, but the bulk of the ACL proximally seemed diminished, indicating a previous partial thickness tear.    He reports that the pain is better compared to preop. He still has some snapping. He did not experience a lot of knee pain after surgery. 2-3 days post op, his calf was stiff and painful but this resolved after a few days.      /80  Pulse 78  Temp 97.8  F (36.6  C) (Oral)  Ht 1.803 m (5' 11\")  Wt 98 kg (216 lb)  SpO2 96%  BMI 30.13 kg/m2    Exam:  Wounds are healing well. He has a minimal effusion.   ROM: 0-135 degrees    No evidence of infection.    Impression:  1. Status post right knee arthroscopic menisectomy doing well     Plan:  I reviewed the operative findings with him.     Will continue range of motion and strengthening. Scar massage.  Advance activity as tolerated.  RTC 3-4 weeks if still painful. Consider corticosteroid injection or an  brace if he continues to have problems down the road.     ROMAN Lemon MD  Dept. Orthopedic Surgery  [facility]    This document " serves as a record of the services and decisions personally performed and made by Dr. ROMAN Lemon MD. It was created on his behalf by Rose Bullock, a trained medical scribe. The creation of this record is based on the provider's personal observations and the statements of the patient. This document has been checked and approved by the attending provider.   Rose Bullock July 27, 2017 8:26 AM    Again, thank you for allowing me to participate in the care of your patient.        Sincerely,        Jim Lemon MD

## 2018-01-06 DIAGNOSIS — F41.1 GAD (GENERALIZED ANXIETY DISORDER): ICD-10-CM

## 2018-01-08 ENCOUNTER — OFFICE VISIT (OUTPATIENT)
Dept: FAMILY MEDICINE | Facility: CLINIC | Age: 53
End: 2018-01-08
Payer: COMMERCIAL

## 2018-01-08 VITALS
HEART RATE: 85 BPM | TEMPERATURE: 99 F | WEIGHT: 223.6 LBS | BODY MASS INDEX: 31.19 KG/M2 | DIASTOLIC BLOOD PRESSURE: 79 MMHG | SYSTOLIC BLOOD PRESSURE: 126 MMHG

## 2018-01-08 DIAGNOSIS — R10.84 ABDOMINAL PAIN, GENERALIZED: Primary | ICD-10-CM

## 2018-01-08 DIAGNOSIS — F41.1 GAD (GENERALIZED ANXIETY DISORDER): ICD-10-CM

## 2018-01-08 DIAGNOSIS — L25.9 CONTACT DERMATITIS, UNSPECIFIED CONTACT DERMATITIS TYPE, UNSPECIFIED TRIGGER: ICD-10-CM

## 2018-01-08 DIAGNOSIS — G47.30 SLEEP APNEA, UNSPECIFIED TYPE: ICD-10-CM

## 2018-01-08 PROCEDURE — 99214 OFFICE O/P EST MOD 30 MIN: CPT | Performed by: FAMILY MEDICINE

## 2018-01-08 ASSESSMENT — PATIENT HEALTH QUESTIONNAIRE - PHQ9
5. POOR APPETITE OR OVEREATING: SEVERAL DAYS
SUM OF ALL RESPONSES TO PHQ QUESTIONS 1-9: 5

## 2018-01-08 ASSESSMENT — ANXIETY QUESTIONNAIRES
GAD7 TOTAL SCORE: 3
IF YOU CHECKED OFF ANY PROBLEMS ON THIS QUESTIONNAIRE, HOW DIFFICULT HAVE THESE PROBLEMS MADE IT FOR YOU TO DO YOUR WORK, TAKE CARE OF THINGS AT HOME, OR GET ALONG WITH OTHER PEOPLE: NOT DIFFICULT AT ALL
2. NOT BEING ABLE TO STOP OR CONTROL WORRYING: NOT AT ALL
5. BEING SO RESTLESS THAT IT IS HARD TO SIT STILL: NOT AT ALL
3. WORRYING TOO MUCH ABOUT DIFFERENT THINGS: NOT AT ALL
7. FEELING AFRAID AS IF SOMETHING AWFUL MIGHT HAPPEN: NOT AT ALL
6. BECOMING EASILY ANNOYED OR IRRITABLE: SEVERAL DAYS
1. FEELING NERVOUS, ANXIOUS, OR ON EDGE: SEVERAL DAYS

## 2018-01-08 NOTE — PROGRESS NOTES
SUBJECTIVE:  52 year old.The patient has a history of gas pains.  This started 3 weeks ago. Location generalized.  Associated symptoms are pin like feeling of the . left upper quadrant . Worse with eating. Feeling is after within an hour of eating.  Better with Tums for a short time .  Better with the last few days. ROS no constipation or diarrhea.   No blood stool.  Pepto Bismal did not help.   Reviewed health maintenance  Patient Active Problem List   Diagnosis     CARDIOVASCULAR SCREENING; LDL GOAL LESS THAN 160     Generalized anxiety disorder     Generalized anxiety disorder     Past Medical History:   Diagnosis Date     Arthritis     Mother and Father     Chronic sinusitis     Polyposis     Hyperlipidemia      Increased BMI      Psoriasis        OBJECTIVE:  no apparent distress  /79  Pulse 85  Temp 99  F (37.2  C) (Oral)  Wt 223 lb 9.6 oz (101.4 kg)  BMI 31.19 kg/m2    LUNGS:  CTA B/L, no wheezing or crackles.   Cardiovascular: negative, PMI normal. No lifts, heaves, or thrills. RRR. No murmurs, clicks gallops or rub   Gastrointestinal: Abdomen soft, non-tender. BS normal. No masses, organomegaly       ICD-10-CM    1. Abdominal pain, generalized R10.84    2. MELIDA (generalized anxiety disorder) F41.1 FLUoxetine (PROZAC) 20 MG capsule   3. Contact dermatitis, unspecified contact dermatitis type, unspecified trigger L25.9    4. Sleep apnea, unspecified type G47.30 SLEEP EVALUATION & MANAGEMENT REFERRAL - [facility]  [phone] (Age 15 and up)    PLAN: small meals often and if no improvement in 2 weeks then Prilosec OTC for 2 weeks   If symptoms worsen Retun to clinic       SUBJECTIVE: 52 year old male complains of rash   on the hands for   years.   Past history: has tried many different  Treatment and have failed.   .   Associated symptoms: thickened skin.   The patient can recall the following additional factors associated with the onset of this rash:  .       OBJECTIVE: SKIN: examination reveals Skin color, texture, turgor normal. No rashes or lesions.    . The lesions appearance and location are as follows: hands have dry thickened skin.       NAILS: thin thumb nails               SUBJECTIVE:  SUBJECTIVE:  52 year old.The patient has a history of  follow-up of depressive symptoms.    Since last visit the patient has doing okay.  Notes from that visit reviewed. PHQ-9 has been reviewed.  Current symptoms include: Anxiety   Symptoms that have subjectively improved include: Anxiety  Previous and current treatment modalities employed include: Medications   Prozac (Fluoxetine)  Patient Active Problem List   Diagnosis     CARDIOVASCULAR SCREENING; LDL GOAL LESS THAN 160     Generalized anxiety disorder     Generalized anxiety disorder      Reviewed health maintenance  OBJECTIVE:  no apparent distress  /79  Pulse 85  Temp 99  F (37.2  C) (Oral)  Wt 223 lb 9.6 oz (101.4 kg)  BMI 31.19 kg/m2       MENTAL STATUS EXAM:   1. Clinical observations:Patient was clean and was adequately groomed. Patient's emotional presentation was cooperative and sima/open. Patient spoke clearly and articulately. Patient maintained adequate eye contact and was cooperative in answering questions.  2. Patient appeared to be well-oriented in all spheres with coherent, logical, goal directed and relevent thinking.  3. Thought content: Denies auditory and visual hallucinations or delusions.  4. Affect and mood: Affect is alert and her emotional attitude is described as normal.  5. Sensorium and cognition: Patient was in contact with reality and oriented to place, time, person and situation. patient demonstrated no impairment in immediate, recent, or remote memory. patient's insight was adequate without obvious deficits in intelligence.    ICD-10-CM    1. Abdominal pain, generalized R10.84    2. MELIDA (generalized anxiety disorder) F41.1 FLUoxetine (PROZAC) 20 MG capsule   3. Contact dermatitis,  unspecified contact dermatitis type, unspecified trigger L25.9    4. Sleep apnea, unspecified type G47.30 SLEEP EVALUATION & MANAGEMENT REFERRAL - [facility]  [phone] (Age 15 and up)    PLAN:Follow up in 6 months    Patient is having spell at night that last 15-20seconds of not breathing    :

## 2018-01-08 NOTE — MR AVS SNAPSHOT
After Visit Summary   1/8/2018    Luisito Johnston    MRN: [id number]           Patient Information     Date Of Birth          1965        Visit Information        Provider Department      1/8/2018 2:45 PM Red Trejo MD [facility]        Today's Diagnoses     Abdominal pain, generalized    -  1    MELIDA (generalized anxiety disorder)        Contact dermatitis, unspecified contact dermatitis type, unspecified trigger        Sleep apnea, unspecified type           Follow-ups after your visit        Additional Services     SLEEP EVALUATION & MANAGEMENT REFERRAL - [facility] [place]  [phone] (Age 15 and up)       Please be aware that coverage of these services is subject to the terms and limitations of your health insurance plan.  Call member services at your health plan with any benefit or coverage questions.      Please bring the following to your appointment:    >>   List of current medications   >>   This referral request   >>   Any documents/labs given to you for this referral                      Future tests that were ordered for you today     Open Future Orders        Priority Expected Expires Ordered    SLEEP EVALUATION & MANAGEMENT REFERRAL - [facility] [place]  [phone] (Age 15 and up) Routine  1/8/2019 1/8/2018            Who to contact     If you have questions or need follow up information about today's clinic visit or your schedule please contact [facility] directly at [phone].  Normal or non-critical lab and imaging results will be communicated to you by MyChart, letter or phone within 4 business days after the clinic has received the results. If you do not hear from us within 7 days, please contact the clinic through MyChart or phone. If you have a critical or abnormal lab result, we will notify you by phone as soon as possible.  Submit refill requests through "[employer]hart or call your  pharmacy and they will forward the refill request to us. Please allow 3 business days for your refill to be completed.          Additional Information About Your Visit        [employer] Information     [employer] gives you secure access to your electronic health record. If you see a primary care provider, you can also send messages to your care team and make appointments. If you have questions, please call your primary care clinic.  If you do not have a primary care provider, please call [phone] and they will assist you.        Care EveryWhere ID     This is your Care EveryWhere ID. This could be used by other organizations to access your [place] medical records  [id number]        Your Vitals Were     Pulse Temperature BMI (Body Mass Index)             85 99  F (37.2  C) (Oral) 31.19 kg/m2          Blood Pressure from Last 3 Encounters:   01/08/18 126/79   07/27/17 120/80   07/14/17 101/69    Weight from Last 3 Encounters:   01/08/18 223 lb 9.6 oz (101.4 kg)   07/27/17 216 lb (98 kg)   06/29/17 215 lb 9.6 oz (97.8 kg)                 Today's Medication Changes          These changes are accurate as of: 1/8/18  3:29 PM.  If you have any questions, ask your nurse or doctor.               These medicines have changed or have updated prescriptions.        Dose/Directions    FLUoxetine 20 MG capsule   Commonly known as:  PROzac   This may have changed:  See the new instructions.   Used for:  MELIDA (generalized anxiety disorder)   Changed by:  Red Trejo MD        TAKE 1 CAPSULE (20 MG) BY MOUTH DAILY   Quantity:  90 capsule   Refills:  1            Where to get your medicines      These medications were sent to [id number]  [address] [address] - [address] [facility] [address] [address] [address]     Phone:  [phone]     FLUoxetine 20 MG capsule                Primary Care Provider Office Phone # Fax #    [facility] [phone]        [address] [address] [address]        Equal Access to Services     TANK MAHMOOD : Hadii aad ku hadmeganglenn Sosamali, wagautamda luyojanavaleriyha, qajayrota kajailynclaudy sommerajclaudy, moiz arreagacoltonlorie lu. So [facility] [phone].    ATENCIÓN: Si habla español, tiene a christianson disposición servicios gratuitos de asistencia lingüística. Llame al [phone].    We comply with applicable federal civil rights laws and [place] laws. We do not discriminate on the basis of race, color, national origin, age, disability, sex, sexual orientation, or gender identity.            Thank you!     Thank you for choosing [facility]  for your care. Our goal is always to provide you with excellent care. Hearing back from our patients is one way we can continue to improve our services. Please take a few minutes to complete the written survey that you may receive in the mail after your visit with us. Thank you!             Your Updated Medication List - Protect others around you: Learn how to safely use, store and throw away your medicines at www.disposemymeds.org.          This list is accurate as of: 1/8/18  3:29 PM.  Always use your most recent med list.                   Brand Name Dispense Instructions for use Diagnosis    FLUoxetine 20 MG capsule    PROzac    90 capsule    TAKE 1 CAPSULE (20 MG) BY MOUTH DAILY    MELIDA (generalized anxiety disorder)

## 2018-01-08 NOTE — NURSING NOTE
"Chief Complaint   Patient presents with     Gastric Problem     reflux and left upper quadrant abdominal pain after eating, has had some improvement since symptoms first appeared        Initial /79  Pulse 85  Temp 99  F (37.2  C) (Oral)  Wt 223 lb 9.6 oz (101.4 kg)  BMI 31.19 kg/m2 Estimated body mass index is 31.19 kg/(m^2) as calculated from the following:    Height as of 7/27/17: 5' 11\" (1.803 m).    Weight as of this encounter: 223 lb 9.6 oz (101.4 kg).  Medication Reconciliation: complete  Slade Aragon CMA    "

## 2018-01-09 DIAGNOSIS — F41.1 GAD (GENERALIZED ANXIETY DISORDER): ICD-10-CM

## 2018-01-09 ASSESSMENT — ANXIETY QUESTIONNAIRES: GAD7 TOTAL SCORE: 3

## 2018-01-31 ENCOUNTER — OFFICE VISIT (OUTPATIENT)
Dept: SLEEP MEDICINE | Facility: CLINIC | Age: 53
End: 2018-01-31
Payer: COMMERCIAL

## 2018-01-31 VITALS
WEIGHT: 224.4 LBS | HEIGHT: 71 IN | SYSTOLIC BLOOD PRESSURE: 116 MMHG | DIASTOLIC BLOOD PRESSURE: 83 MMHG | BODY MASS INDEX: 31.42 KG/M2 | HEART RATE: 89 BPM | OXYGEN SATURATION: 97 %

## 2018-01-31 DIAGNOSIS — E66.09 CLASS 1 OBESITY DUE TO EXCESS CALORIES WITHOUT SERIOUS COMORBIDITY WITH BODY MASS INDEX (BMI) OF 31.0 TO 31.9 IN ADULT: ICD-10-CM

## 2018-01-31 DIAGNOSIS — R06.83 SNORING: Primary | ICD-10-CM

## 2018-01-31 DIAGNOSIS — E66.811 CLASS 1 OBESITY DUE TO EXCESS CALORIES WITHOUT SERIOUS COMORBIDITY WITH BODY MASS INDEX (BMI) OF 31.0 TO 31.9 IN ADULT: ICD-10-CM

## 2018-01-31 DIAGNOSIS — G47.19 EXCESSIVE DAYTIME SLEEPINESS: ICD-10-CM

## 2018-01-31 DIAGNOSIS — R06.81 WITNESSED EPISODE OF APNEA: ICD-10-CM

## 2018-01-31 PROCEDURE — 99205 OFFICE O/P NEW HI 60 MIN: CPT | Performed by: FAMILY MEDICINE

## 2018-01-31 RX ORDER — ZOLPIDEM TARTRATE 5 MG/1
TABLET ORAL
Qty: 1 TABLET | Refills: 0 | Status: SHIPPED | OUTPATIENT
Start: 2018-01-31 | End: 2018-02-14

## 2018-01-31 NOTE — PATIENT INSTRUCTIONS
"MY TREATMENT INFORMATION FOR SLEEP APNEA -  Luisitojaswant Johnston    DOCTOR: Fadi Fernandes MD, MPH  SLEEP CENTER : [facility]         If I haven't had a sleep study yet, what can I expect?  A personal story from [employer]  https://www.[employer].com/watch?v=AxPLmlRpnCs      Am I having a home sleep study?  Here is a video in case you get home and want to make sure you have done it correctly  https://www.Vivog[employer].com/watch?v=DCJ0E8dQaq4&feature=youtu.be      Frequently asked questions:  1. What is Obstructive Sleep Apnea (ALLAN)? ALLAN is the most common type of sleep apnea. Apnea literally means, \"without breath.\" It is characterized by repetitive pauses in breathing, despite continued effort to breathe, and is usually associated with a reduction in blood oxygen saturation. Apneas can last 10 to over 60 seconds. It is caused by narrowing or collapse of the upper airway as muscles relax during sleep. Severity of sleep apnea is determined by frequency of breathing events and their effect on your sleep and oxygen levels determined during sleep testing.     2. What are the consequences of ALLAN? Symptoms include: daytime sleepiness- possibly increasing the risk of falling asleep while driving, unrefreshing/restless sleep, snoring, insomnia, waking frequently to urinate, waking with heartburn or reflux, reduced concentration and memory, and morning headaches. Other health consequences may include development of high blood pressure and other cardiovascular disease in persons who are susceptible. Untreated ALLAN  can contribute to heart disease, stroke and diabetes.     3. What are the treatment options? In most situations, sleep apnea is a lifelong disease that must be managed with daily therapy. Medications are not effective for sleep apnea and surgery is generally not performed until other therapies have been tried. Therapy is usually tailored to the individual patient based on many factors including your wishes as " well as severity of sleep apnea and severity of obesity. Continuous Positive Airway (CPAP) is the most reliable treatment. An oral device to hold your jaw forward is usually the next most reliable option. Other options include postioning devices (to keep you off your back), weight loss, and surgery including a tongue pacing device. There is more detail about some of these options below.            1. CPAP-  WHAT DOES IT DO AND HOW CAN I LEARN TO WEAR IT?                               BEFORE I START, CAN I WATCH A MOVIE TO GET A PLAN ON HOW TO USE CPAP?  https://www.[employer].com/watch?j=r7G80hn583N      Continuous positive airway pressure, or CPAP, is the most effective treatment for obstructive sleep apnea. It works by blowing room air, through a mask, to hold your throat open. A decision to use CPAP is a major step forward in the pursuit of a healthier life. The successful use of CPAP will help you breathe easier, sleep better and live healthier. You can choose CPAP equipment from any durable medical equipment provider that meets your needs.  Using CPAP can be a positive experience if you keep these edward points in mind:  1. Commitment  CPAP is not a quick fix for your problem. It involves a long-term commitment to improve your sleep and your health.    2. Communication  Stay in close communication with both your sleep doctor and your CPAP supplier. Ask lots of questions and seek help when you need it.    3. Consistency  Use CPAP all night, every night and for every nap. You will receive the maximum health benefits from CPAP when you use it every time that you sleep. This will also make it easier for your body to adjust to the treatment.    4. Correction  The first machine and mask that you try may not be the best ones for you. Work with your sleep doctor and your CPAP supplier to make corrections to your equipment selection. Ask about trying a different type of machine or mask if you have ongoing problems. Make sure  "that your mask is a good fit and learn to use your equipment properly.    5. Challenge  Tell a family member or close friend to ask you each morning if you used your CPAP the previous night. Have someone to challenge you to give it your best effort.    6. Connection   Your adjustment to CPAP will be easier if you are able to connect with others who use the same treatment. Ask your sleep doctor if there is a support group in your area for people who have sleep apnea, or look for one on the Internet.  7. Comfort   Increase your level of comfort by using a saline spray, decongestant or heated humidifier if CPAP irritates your nose, mouth or throat. Use your unit's \"ramp\" setting to slowly get used to the air pressure level. There may be soft pads you can buy that will fit over your mask straps. Look on www.CPAP.com for accessories that can help make CPAP use more comfortable.  8. Cleaning   Clean your mask, tubing and headgear on a regular basis. Put this time in your schedule so that you don't forget to do it. Check and replace the filters for your CPAP unit and humidifier.    9. Completion   Although you are never finished with CPAP therapy, you should reward yourself by celebrating the completion of your first month of treatment. Expect this first month to be your hardest period of adjustment. It will involve some trial and error as you find the machine, mask and pressure settings that are right for you.    10. Continuation  After your first month of treatment, continue to make a daily commitment to use your CPAP all night, every night and for every nap.    CPAP-Tips to starting with success:  Begin using your CPAP for short periods of time during the day while you watch TV or read.    Use CPAP every night and for every nap. Using it less often reduces the health benefits and makes it harder for your body to get used to it.    Make small adjustments to your mask, tubing, straps and headgear until you get the right " fit. Tightening the mask may actually worsen the leak.  If it leaves significant marks on your face or irritates the bridge of your nose, it may not be the best mask for you.  Speak with the person who supplied the mask and consider trying other masks. Insurances will allow you to try different masks during the first month of starting CPAP.  Insurance also covers a new mask, hose and filter about every 6 months.    Use a saline nasal spray to ease mild nasal congestion. Neti-Pot or saline nasal rinses may also help. Nasal gel sprays can help reduce nasal dryness.  Biotene mouthwash can be helpful to protect your teeth if you experience frequent dry mouth.  Dry mouth may be a sign of air escaping out of your mouth or out of the mask in the case of a full face mask.  Speak with your provider if you expect that is the case.     Take a nasal decongestant to relieve more severe nasal or sinus congestion.  Do not use Afrin (oxymetazoline) nasal spray more than 3 days in a row.  Speak with your sleep doctor if your nasal congestion is chronic.    Use a heated humidifier that fits your CPAP model to enhance your breathing comfort. Adjust the heat setting up if you get a dry nose or throat, down if you get condensation in the hose or mask.  Position the CPAP lower than you so that any condensation in the hose drains back into the machine rather than towards the mask.    Try a system that uses nasal pillows if traditional masks give you problems.    Clean your mask, tubing and headgear once a week. Make sure the equipment dries fully.    Regularly check and replace the filters for your CPAP unit and humidifier.    Work closely with your sleep provider and your CPAP supplier to make sure that you have the machine, mask and air pressure setting that works best for you. It is better to stop using it and call your provider to solve problems than to lay awake all night frustrated with the device.      BESIDES CPAP, WHAT OTHER  THERAPIES ARE THERE?        Positioning Device  Positioning devices are generally used when sleep apnea is mild and only occurs on your back.This example shows a pillow that straps around the waist. It may be appropriate for those whose sleep study shows milder sleep apnea that occurs primarily when lying flat on one's back. Preliminary studies have shown benefit but effectiveness at home may need to be verified by a home sleep test. These devices are generally not covered by medical insurance.                        Oral Appliance  What is oral appliance therapy?  An oral appliance is a small acrylic device that fits over the upper and lower teeth or tongue (similar to an orthodontic retainer or a mouth guard). This device slightly advances the lower jaw or tongue, which moves the base of the tongue forward, opens the airway, improves breathing and can effectively treat snoring and obstructive sleep apnea sleep apnea. The appliance is fabricated and customized by a qualified dentist with experience in treating snoring and sleep apnea. Oral appliances are usually well tolerated and have relatively high compliance by patients1, 2, 3.  When is an oral appliance indicated?  Oral appliance therapy is recommended as a first-line treatment for patients with primary snoring, mild sleep apnea, and for patients with moderate sleep apnea who prefer appliance therapy to use of CPAP4, 5. Severity of sleep apnea is determined by sleep testing and is based on the number of respiratory events per hour of sleep.   How successful is oral appliance therapy?  The success rate of oral appliance therapy in patients with mild sleep apnea is 75-80% while in patients with moderate sleep apnea it is 50-70%. The chance of success in patients with severe sleep apnea is 40-50%. The research also shows that oral appliances have a beneficial effect on the cardiovascular health of ALLAN patients at the same magnitude as CPAP therapy7.  Oral  appliances should be a second-line treatment in cases of severe sleep apnea, but if not completely successful then a combination therapy utilizing CPAP plus oral appliance therapy may be effective. Oral appliances tend to be effective in a broad range of patients although studies show that the patients who have the highest success are females, younger patients, those with milder disease, and less severe obesity. 3, 6.   The chances of success are lower in patients who have more severe ALLAN, are older, and those who are morbidly obese.     Example of an oral appliance   Finding a dentist that practices dental sleep medicine  Specific training is available through the American Academy of Dental Sleep Medicine for dentists interested in working in the field of sleep. To find a dentist who is educated in the field of sleep and the use of oral appliances, near you, visit the Web site of the American Academy of Dental Sleep Medicine; also see   http://www.accpstorage.org/newOrganization/patients/oralAppliances.pdf  To search for a dentist certified in these practices:  Http://aadsm.org/FindADentist.aspx?1  1. Ilana et al. Objectively measured vs self-reported compliance during oral appliance therapy for sleep-disordered breathing. Chest 2013; 144(5): 6992-5361.  2. Rito et al. Objective measurement of compliance during oral appliance therapy for sleep-disordered breathing. Thorax 2013; 68(1): 91-96.  3. Siomara, et al. Mandibular advancement devices in 620 men and women with ALLAN and snoring: tolerability and predictors of treatment success. Chest 2004; 125: 4691-5995.  4. Katie, et al. Oral appliances for snoring and ALLAN: a review. Sleep 2006; 29: 244-262.  5. Stacey, et al. Oral appliance treatment for ALLAN: an update. J Clin Sleep Med 2014; 10(2): 215-227.  6. Amaury et al. Predictors of OSAH treatment outcome. J Dent Res 2007; 86: 1922-6156.      Weight Loss:    Weight management is a personal  decision.  If you are interested in exploring weight loss strategies, the following discussion covers the impact on weight loss on sleep apnea and the approaches that may be successful.    Weight loss decreases severity of sleep apnea in most people with obesity. For those with mild obesity who have developed snoring with weight gain, even 15-30 pound weight loss can improve and occasionally eliminate sleep apnea.  Structured and life-long dietary and health habits are necessary to lose weight and keep healthier weight levels.     Though there may be significant health benefits from weight loss, long-term weight loss is very difficult to achieve- studies show success with dietary management in less than 10% of people. In addition, substantial weight loss may require years of dietary control and may be difficult if patients have severe obesity. In these cases, surgical management may be considered.  Finally, older individuals who have tolerated obesity without health complications may be less likely to benefit from weight loss strategies.    Your BMI is Body mass index is 31.08 kg/(m^2).  Body mass index (BMI) is one way to tell whether you are at a healthy weight, overweight, or obese. It measures your weight in relation to your height.  A BMI of 18.5 to 24.9 is in the healthy range. A person with a BMI of 25 to 29.9 is considered overweight, and someone with a BMI of 30 or greater is considered obese. More than two-thirds of American adults are considered overweight or obese.  Being overweight or obese increases the risk for further weight gain. Excess weight may lead to heart disease and diabetes.  Creating and following plans for healthy eating and physical activity may help you improve your health.  Weight control is part of healthy lifestyle and includes exercise, emotional health, and healthy eating habits. Careful eating habits lifelong are the mainstay of weight control. Though there are significant health  benefits from weight loss, long-term weight loss with diet alone may be very difficult to achieve- studies show long-term success with dietary management in less than 10% of people. Attaining a healthy weight may be especially difficult to achieve in those with severe obesity. In some cases, medications, devices and surgical management might be considered.  What can you do?  If you are overweight or obese and are interested in methods for weight loss, you should discuss this with your provider.     Consider reducing daily calorie intake by 500 calories.     Keep a food journal.     Avoiding skipping meals, consider cutting portions instead.    Diet combined with exercise helps maintain muscle while optimizing fat loss. Strength training is particularly important for building and maintaining muscle mass. Exercise helps reduce stress, increase energy, and improves fitness. Increasing exercise without diet control, however, may not burn enough calories to loose weight.       Start walking three days a week 10-20 minutes at a time    Work towards walking thirty minutes five days a week     Eventually, increase the speed of your walking for 1-2 minutes at time    In addition, we recommend that you review healthy lifestyles and methods for weight loss available through the National Institutes of Health patient information sites:  http://win.niddk.nih.gov/publications/index.htm    And look into health and wellness programs that may be available through your health insurance provider, employer, local community center, or [employer].    Weight management plan: Discussed healthy diet and exercise guidelines and patient will follow up in 3 months in clinic to re-evaluate.  Surgery:    Upper Airway Surgery for ALLAN  Surgery for ALLAN is a second-line treatment option in the management of sleep apnea.  Surgery should be considered for patients who are having a difficult time tolerating CPAP.    Surgery for ALLAN is directed at areas  that are responsible for narrowing or complete obstruction of the airway during sleep.  There are a wide range of procedures available to enlarge and/or stabilize the airway to prevent blockage of breathing in the three major areas where it can occur: the palate, tongue, and nasal regions.  Successful surgical treatment depends on the accurate identification of the factors responsible for obstructive sleep apnea in each person.  A personalized approach is required because there is no single treatment that works well for everyone.  Because of anatomic variation, consultation with an examination by a sleep surgeon is a critical first step in determining what surgical options are best for each patient.  In some cases, examination during sedation may be recommended in order to guide the selection of procedures.  Patients will be counseled about risks and benefits as well as the typical recovery course after surgery. Surgery is typically not a cure for a person s ALLAN.  However, surgery will often significantly improve one s ALLAN severity (termed  success rate ).  Even in the absence of a cure, surgery will decrease the cardiovascular risk associated with OSA7; improve overall quality of life8 (sleepiness, functionality, sleep quality, etc).          Palate Procedures:  Patients with ALLAN often have narrowing of their airway in the region of their tonsils and uvula.  The goals of palate procedures are to widen the airway in this region as well as to help the tissues resist collapse.  Modern palate procedure techniques focus on tissue conservation and soft tissue rearrangement, rather than tissue removal.  Often the uvula is preserved in this procedure. Residual sleep apnea is common in patient after pharyngoplasty with an average reduction in sleep apnea events of 33%2.      Tongue Procedures:  While patients are awake, the muscles that surround the throat are active and keep this region open for breathing. These muscles  relax during sleep, allowing the tongue and other structures to collapse and block breathing.  There are several different tongue procedures available.  Selection of a tongue base procedure depends on characteristics seen on physical exam.  Generally, procedures are aimed at removing bulky tissues in this area or preventing the back of the tongue from falling back during sleep.  Success rates for tongue surgery range from 50-62%3.    Hypoglossal Nerve Stimulation:  Hypoglossal nerve stimulation has recently received approval from the [place] Food and Drug Administration for the treatment of obstructive sleep apnea.  This is based on research showing that the system was safe and effective in treating sleep apnea6.  Results showed that the median AHI score decreased 68%, from 29.3 to 9.0. This therapy uses an implant system that senses breathing patterns and delivers mild stimulation to airway muscles, which keeps the airway open during sleep.  The system consists of three fully implanted components: a small generator (similar in size to a pacemaker), a breathing sensor, and a stimulation lead.  Using a small handheld remote, a patient turns the therapy on before bed and off upon awakening.    Candidates for this device must be greater than 22 years of age, have moderate to severe ALLAN (AHI between 20-65), BMI less than 32, have tried CPAP/oral appliance without success, and have appropriate upper airway anatomy (determined by a sleep endoscopy performed by Dr. Ramírez).    Hypoglossal Nerve Stimulation Pathway:    The sleep surgeon s office will work with the patient through the insurance prior-authorization process (including communications and appeals).    Nasal Procedures:  Nasal obstruction can interfere with nasal breathing during the day and night.  Studies have shown that relief of nasal obstruction can improve the ability of some patients to tolerate positive airway pressure therapy for obstructive sleep  apnea1.  Treatment options include medications such as nasal saline, topical corticosteroid and antihistamine sprays, and oral medications such as antihistamines or decongestants. Non-surgical treatments can include external nasal dilators for selected patients. If these are not successful by themselves, surgery can improve the nasal airway either alone or in combination with these other options.    Combination Procedures:  Combination of surgical procedures and other treatments may be recommended, particularly if patients have more than one area of narrowing or persistent positional disease.  The success rate of combination surgery ranges from 66-80%2,3.      1. Yashira HAM. The Role of the Nose in Snoring and Obstructive Sleep Apnoea: An Update.  Eur Arch Otorhinolaryngol. 2011; 268: 1365-73.  2.  Hu SM; Emy JA; Azucena JR; Pallanch JF; Dewayne MB; Rachelle SG; Sharonda ROME. Surgical modifications of the upper airway for obstructive sleep apnea in adults: a systematic review and meta-analysis. SLEEP 2010;33(10):0270-1698. Elgin MOLINA. Hypopharyngeal surgery in obstructive sleep apnea: an evidence-based medicine review.  Arch Otolaryngol Head Neck Surg. 2006 Feb;132(2):206-13.  3. Yoni YH1, Nathalie Y, William MILAGRO. The efficacy of anatomically based multilevel surgery for obstructive sleep apnea. Otolaryngol Head Neck Surg. 2003 Oct;129(4):327-35.  4. Elgin MOLINA, Goldberg A. Hypopharyngeal Surgery in Obstructive Sleep Apnea: An Evidence-Based Medicine Review. Arch Otolaryngol Head Neck Surg. 2006 Feb;132(2):206-13.  5. Amrita SANTIAGO et al. Upper-Airway Stimulation for Obstructive Sleep Apnea.  N Engl J Med. 2014 Jan 9;370(2):139-49.  6. Marissa Y et al. Increased Incidence of Cardiovascular Disease in Middle-aged Men with Obstructive Sleep Apnea. Am J Respir Crit Care Med; 2002 166: 159-165  7. Sofia MENDOZA et al. Studying Life Effects and Effectiveness of Palatopharyngoplasty (SLEEP) study: Subjective Outcomes of Isolated  Uvulopalatopharyngoplasty. Otolaryngol Head Neck Surg. 2011; 144: 623-631.  Please, schedule sleep study and follow up visit with the nurse (she will coming into the room and meet with you). Use sleeping aid only if needed during the night of the study.    Thank you!    Fadi Fernandes MD, MPH  Clinical Sleep and Occupational / Environmental Medicine

## 2018-01-31 NOTE — MR AVS SNAPSHOT
"              After Visit Summary   1/31/2018    Luisito Johnston    MRN: [id number]           Patient Information     Date Of Birth          1965        Visit Information        Provider Department      1/31/2018 9:30 AM Fadi Fernandes MD [facility]        Today's Diagnoses     Snoring    -  1    Witnessed episode of apnea        Excessive daytime sleepiness        Class 1 obesity due to excess calories without serious comorbidity with body mass index (BMI) of 31.0 to 31.9 in adult          Care Instructions    MY TREATMENT INFORMATION FOR SLEEP APNEA -  Luisito CARR Preston    DOCTOR: Fadi Fernandes MD, MPH  SLEEP CENTER : [facility]         If I haven't had a sleep study yet, what can I expect?  A personal story from [employer]  https://www.SimplyCast.com/watch?v=AxPLmlRpnCs      Am I having a home sleep study?  Here is a video in case you get home and want to make sure you have done it correctly  https://www.SimplyCast.com/watch?v=LOT6O8rZxs2&feature=youtu.be      Frequently asked questions:  1. What is Obstructive Sleep Apnea (ALLAN)? ALLAN is the most common type of sleep apnea. Apnea literally means, \"without breath.\" It is characterized by repetitive pauses in breathing, despite continued effort to breathe, and is usually associated with a reduction in blood oxygen saturation. Apneas can last 10 to over 60 seconds. It is caused by narrowing or collapse of the upper airway as muscles relax during sleep. Severity of sleep apnea is determined by frequency of breathing events and their effect on your sleep and oxygen levels determined during sleep testing.     2. What are the consequences of ALLAN? Symptoms include: daytime sleepiness- possibly increasing the risk of falling asleep while driving, unrefreshing/restless sleep, snoring, insomnia, waking frequently to urinate, waking with heartburn or reflux, reduced concentration and memory, and morning headaches. Other health " consequences may include development of high blood pressure and other cardiovascular disease in persons who are susceptible. Untreated ALLAN  can contribute to heart disease, stroke and diabetes.     3. What are the treatment options? In most situations, sleep apnea is a lifelong disease that must be managed with daily therapy. Medications are not effective for sleep apnea and surgery is generally not performed until other therapies have been tried. Therapy is usually tailored to the individual patient based on many factors including your wishes as well as severity of sleep apnea and severity of obesity. Continuous Positive Airway (CPAP) is the most reliable treatment. An oral device to hold your jaw forward is usually the next most reliable option. Other options include postioning devices (to keep you off your back), weight loss, and surgery including a tongue pacing device. There is more detail about some of these options below.            1. CPAP-  WHAT DOES IT DO AND HOW CAN I LEARN TO WEAR IT?                               BEFORE I START, CAN I WATCH A MOVIE TO GET A PLAN ON HOW TO USE CPAP?  https://www.iSkoot.com/watch?k=z9N57zi673I      Continuous positive airway pressure, or CPAP, is the most effective treatment for obstructive sleep apnea. It works by blowing room air, through a mask, to hold your throat open. A decision to use CPAP is a major step forward in the pursuit of a healthier life. The successful use of CPAP will help you breathe easier, sleep better and live healthier. You can choose CPAP equipment from any durable medical equipment provider that meets your needs.  Using CPAP can be a positive experience if you keep these edward points in mind:  1. Commitment  CPAP is not a quick fix for your problem. It involves a long-term commitment to improve your sleep and your health.    2. Communication  Stay in close communication with both your sleep doctor and your CPAP supplier. Ask lots of questions and  "seek help when you need it.    3. Consistency  Use CPAP all night, every night and for every nap. You will receive the maximum health benefits from CPAP when you use it every time that you sleep. This will also make it easier for your body to adjust to the treatment.    4. Correction  The first machine and mask that you try may not be the best ones for you. Work with your sleep doctor and your CPAP supplier to make corrections to your equipment selection. Ask about trying a different type of machine or mask if you have ongoing problems. Make sure that your mask is a good fit and learn to use your equipment properly.    5. Challenge  Tell a family member or close friend to ask you each morning if you used your CPAP the previous night. Have someone to challenge you to give it your best effort.    6. Connection   Your adjustment to CPAP will be easier if you are able to connect with others who use the same treatment. Ask your sleep doctor if there is a support group in your area for people who have sleep apnea, or look for one on the Internet.  7. Comfort   Increase your level of comfort by using a saline spray, decongestant or heated humidifier if CPAP irritates your nose, mouth or throat. Use your unit's \"ramp\" setting to slowly get used to the air pressure level. There may be soft pads you can buy that will fit over your mask straps. Look on www.CPAP.com for accessories that can help make CPAP use more comfortable.  8. Cleaning   Clean your mask, tubing and headgear on a regular basis. Put this time in your schedule so that you don't forget to do it. Check and replace the filters for your CPAP unit and humidifier.    9. Completion   Although you are never finished with CPAP therapy, you should reward yourself by celebrating the completion of your first month of treatment. Expect this first month to be your hardest period of adjustment. It will involve some trial and error as you find the machine, mask and pressure " settings that are right for you.    10. Continuation  After your first month of treatment, continue to make a daily commitment to use your CPAP all night, every night and for every nap.    CPAP-Tips to starting with success:  Begin using your CPAP for short periods of time during the day while you watch TV or read.    Use CPAP every night and for every nap. Using it less often reduces the health benefits and makes it harder for your body to get used to it.    Make small adjustments to your mask, tubing, straps and headgear until you get the right fit. Tightening the mask may actually worsen the leak.  If it leaves significant marks on your face or irritates the bridge of your nose, it may not be the best mask for you.  Speak with the person who supplied the mask and consider trying other masks. Insurances will allow you to try different masks during the first month of starting CPAP.  Insurance also covers a new mask, hose and filter about every 6 months.    Use a saline nasal spray to ease mild nasal congestion. Neti-Pot or saline nasal rinses may also help. Nasal gel sprays can help reduce nasal dryness.  Biotene mouthwash can be helpful to protect your teeth if you experience frequent dry mouth.  Dry mouth may be a sign of air escaping out of your mouth or out of the mask in the case of a full face mask.  Speak with your provider if you expect that is the case.     Take a nasal decongestant to relieve more severe nasal or sinus congestion.  Do not use Afrin (oxymetazoline) nasal spray more than 3 days in a row.  Speak with your sleep doctor if your nasal congestion is chronic.    Use a heated humidifier that fits your CPAP model to enhance your breathing comfort. Adjust the heat setting up if you get a dry nose or throat, down if you get condensation in the hose or mask.  Position the CPAP lower than you so that any condensation in the hose drains back into the machine rather than towards the mask.    Try a system  that uses nasal pillows if traditional masks give you problems.    Clean your mask, tubing and headgear once a week. Make sure the equipment dries fully.    Regularly check and replace the filters for your CPAP unit and humidifier.    Work closely with your sleep provider and your CPAP supplier to make sure that you have the machine, mask and air pressure setting that works best for you. It is better to stop using it and call your provider to solve problems than to lay awake all night frustrated with the device.      BESIDES CPAP, WHAT OTHER THERAPIES ARE THERE?        Positioning Device  Positioning devices are generally used when sleep apnea is mild and only occurs on your back.This example shows a pillow that straps around the waist. It may be appropriate for those whose sleep study shows milder sleep apnea that occurs primarily when lying flat on one's back. Preliminary studies have shown benefit but effectiveness at home may need to be verified by a home sleep test. These devices are generally not covered by medical insurance.                        Oral Appliance  What is oral appliance therapy?  An oral appliance is a small acrylic device that fits over the upper and lower teeth or tongue (similar to an orthodontic retainer or a mouth guard). This device slightly advances the lower jaw or tongue, which moves the base of the tongue forward, opens the airway, improves breathing and can effectively treat snoring and obstructive sleep apnea sleep apnea. The appliance is fabricated and customized by a qualified dentist with experience in treating snoring and sleep apnea. Oral appliances are usually well tolerated and have relatively high compliance by patients1, 2, 3.  When is an oral appliance indicated?  Oral appliance therapy is recommended as a first-line treatment for patients with primary snoring, mild sleep apnea, and for patients with moderate sleep apnea who prefer appliance therapy to use of CPAP4, 5.  Severity of sleep apnea is determined by sleep testing and is based on the number of respiratory events per hour of sleep.   How successful is oral appliance therapy?  The success rate of oral appliance therapy in patients with mild sleep apnea is 75-80% while in patients with moderate sleep apnea it is 50-70%. The chance of success in patients with severe sleep apnea is 40-50%. The research also shows that oral appliances have a beneficial effect on the cardiovascular health of ALLAN patients at the same magnitude as CPAP therapy7.  Oral appliances should be a second-line treatment in cases of severe sleep apnea, but if not completely successful then a combination therapy utilizing CPAP plus oral appliance therapy may be effective. Oral appliances tend to be effective in a broad range of patients although studies show that the patients who have the highest success are females, younger patients, those with milder disease, and less severe obesity. 3, 6.   The chances of success are lower in patients who have more severe ALLAN, are older, and those who are morbidly obese.     Example of an oral appliance   Finding a dentist that practices dental sleep medicine  Specific training is available through the American Academy of Dental Sleep Medicine for dentists interested in working in the field of sleep. To find a dentist who is educated in the field of sleep and the use of oral appliances, near you, visit the Web site of the American Academy of Dental Sleep Medicine; also see   http://www.accpstorage.org/newOrganization/patients/oralAppliances.pdf  To search for a dentist certified in these practices:  Http://aadsm.org/FindADentist.aspx?1  1. Ilana et al. Objectively measured vs self-reported compliance during oral appliance therapy for sleep-disordered breathing. Chest 2013; 144(5): 4553-4658.  2. Rito et al. Objective measurement of compliance during oral appliance therapy for sleep-disordered breathing.  Thorax 2013; 68(1): 91-96.  3. Siomara et al. Mandibular advancement devices in 620 men and women with ALLAN and snoring: tolerability and predictors of treatment success. Chest 2004; 125: 6111-0693.  4. Katie et al. Oral appliances for snoring and ALLAN: a review. Sleep 2006; 29: 244-262.  5. Stacey et al. Oral appliance treatment for ALLAN: an update. J Clin Sleep Med 2014; 10(2): 215-227.  6. Amaury et al. Predictors of OSAH treatment outcome. J Dent Res 2007; 86: 3610-8848.      Weight Loss:    Weight management is a personal decision.  If you are interested in exploring weight loss strategies, the following discussion covers the impact on weight loss on sleep apnea and the approaches that may be successful.    Weight loss decreases severity of sleep apnea in most people with obesity. For those with mild obesity who have developed snoring with weight gain, even 15-30 pound weight loss can improve and occasionally eliminate sleep apnea.  Structured and life-long dietary and health habits are necessary to lose weight and keep healthier weight levels.     Though there may be significant health benefits from weight loss, long-term weight loss is very difficult to achieve- studies show success with dietary management in less than 10% of people. In addition, substantial weight loss may require years of dietary control and may be difficult if patients have severe obesity. In these cases, surgical management may be considered.  Finally, older individuals who have tolerated obesity without health complications may be less likely to benefit from weight loss strategies.    Your BMI is Body mass index is 31.08 kg/(m^2).  Body mass index (BMI) is one way to tell whether you are at a healthy weight, overweight, or obese. It measures your weight in relation to your height.  A BMI of 18.5 to 24.9 is in the healthy range. A person with a BMI of 25 to 29.9 is considered overweight, and someone with a BMI of 30 or greater  is considered obese. More than two-thirds of American adults are considered overweight or obese.  Being overweight or obese increases the risk for further weight gain. Excess weight may lead to heart disease and diabetes.  Creating and following plans for healthy eating and physical activity may help you improve your health.  Weight control is part of healthy lifestyle and includes exercise, emotional health, and healthy eating habits. Careful eating habits lifelong are the mainstay of weight control. Though there are significant health benefits from weight loss, long-term weight loss with diet alone may be very difficult to achieve- studies show long-term success with dietary management in less than 10% of people. Attaining a healthy weight may be especially difficult to achieve in those with severe obesity. In some cases, medications, devices and surgical management might be considered.  What can you do?  If you are overweight or obese and are interested in methods for weight loss, you should discuss this with your provider.     Consider reducing daily calorie intake by 500 calories.     Keep a food journal.     Avoiding skipping meals, consider cutting portions instead.    Diet combined with exercise helps maintain muscle while optimizing fat loss. Strength training is particularly important for building and maintaining muscle mass. Exercise helps reduce stress, increase energy, and improves fitness. Increasing exercise without diet control, however, may not burn enough calories to loose weight.       Start walking three days a week 10-20 minutes at a time    Work towards walking thirty minutes five days a week     Eventually, increase the speed of your walking for 1-2 minutes at time    In addition, we recommend that you review healthy lifestyles and methods for weight loss available through the National Institutes of Health patient information sites:  http://win.niddk.nih.gov/publications/index.htm    And look  into health and wellness programs that may be available through your health insurance provider, employer, local community center, or [employer]ristina club.    Weight management plan: Discussed healthy diet and exercise guidelines and patient will follow up in 3 months in clinic to re-evaluate.  Surgery:    Upper Airway Surgery for ALLAN  Surgery for ALLAN is a second-line treatment option in the management of sleep apnea.  Surgery should be considered for patients who are having a difficult time tolerating CPAP.    Surgery for ALLAN is directed at areas that are responsible for narrowing or complete obstruction of the airway during sleep.  There are a wide range of procedures available to enlarge and/or stabilize the airway to prevent blockage of breathing in the three major areas where it can occur: the palate, tongue, and nasal regions.  Successful surgical treatment depends on the accurate identification of the factors responsible for obstructive sleep apnea in each person.  A personalized approach is required because there is no single treatment that works well for everyone.  Because of anatomic variation, consultation with an examination by a sleep surgeon is a critical first step in determining what surgical options are best for each patient.  In some cases, examination during sedation may be recommended in order to guide the selection of procedures.  Patients will be counseled about risks and benefits as well as the typical recovery course after surgery. Surgery is typically not a cure for a person s ALLAN.  However, surgery will often significantly improve one s ALLAN severity (termed  success rate ).  Even in the absence of a cure, surgery will decrease the cardiovascular risk associated with OSA7; improve overall quality of life8 (sleepiness, functionality, sleep quality, etc).          Palate Procedures:  Patients with ALLAN often have narrowing of their airway in the region of their tonsils and uvula.  The goals of palate  procedures are to widen the airway in this region as well as to help the tissues resist collapse.  Modern palate procedure techniques focus on tissue conservation and soft tissue rearrangement, rather than tissue removal.  Often the uvula is preserved in this procedure. Residual sleep apnea is common in patient after pharyngoplasty with an average reduction in sleep apnea events of 33%2.      Tongue Procedures:  While patients are awake, the muscles that surround the throat are active and keep this region open for breathing. These muscles relax during sleep, allowing the tongue and other structures to collapse and block breathing.  There are several different tongue procedures available.  Selection of a tongue base procedure depends on characteristics seen on physical exam.  Generally, procedures are aimed at removing bulky tissues in this area or preventing the back of the tongue from falling back during sleep.  Success rates for tongue surgery range from 50-62%3.    Hypoglossal Nerve Stimulation:  Hypoglossal nerve stimulation has recently received approval from the [place] Food and Drug Administration for the treatment of obstructive sleep apnea.  This is based on research showing that the system was safe and effective in treating sleep apnea6.  Results showed that the median AHI score decreased 68%, from 29.3 to 9.0. This therapy uses an implant system that senses breathing patterns and delivers mild stimulation to airway muscles, which keeps the airway open during sleep.  The system consists of three fully implanted components: a small generator (similar in size to a pacemaker), a breathing sensor, and a stimulation lead.  Using a small handheld remote, a patient turns the therapy on before bed and off upon awakening.    Candidates for this device must be greater than 22 years of age, have moderate to severe ALLAN (AHI between 20-65), BMI less than 32, have tried CPAP/oral appliance without success, and  have appropriate upper airway anatomy (determined by a sleep endoscopy performed by Dr. Ramírez).    Hypoglossal Nerve Stimulation Pathway:    The sleep surgeon s office will work with the patient through the insurance prior-authorization process (including communications and appeals).    Nasal Procedures:  Nasal obstruction can interfere with nasal breathing during the day and night.  Studies have shown that relief of nasal obstruction can improve the ability of some patients to tolerate positive airway pressure therapy for obstructive sleep apnea1.  Treatment options include medications such as nasal saline, topical corticosteroid and antihistamine sprays, and oral medications such as antihistamines or decongestants. Non-surgical treatments can include external nasal dilators for selected patients. If these are not successful by themselves, surgery can improve the nasal airway either alone or in combination with these other options.    Combination Procedures:  Combination of surgical procedures and other treatments may be recommended, particularly if patients have more than one area of narrowing or persistent positional disease.  The success rate of combination surgery ranges from 66-80%2,3.      1. Yashira HAM. The Role of the Nose in Snoring and Obstructive Sleep Apnoea: An Update.  Eur Arch Otorhinolaryngol. 2011; 268: 1365-73.  2.  Hu SM; Emy JA; Azucena JR; Pallanch JF; Dewayne MB; Rachelle SG; Sharonda FARIASD. Surgical modifications of the upper airway for obstructive sleep apnea in adults: a systematic review and meta-analysis. SLEEP 2010;33(10):5462-1699. Elgin MOLINA. Hypopharyngeal surgery in obstructive sleep apnea: an evidence-based medicine review.  Arch Otolaryngol Head Neck Surg. 2006 Feb;132(2):206-13.  3. Yoni ENGLISHH1, Nathalie Y, William MILAGRO. The efficacy of anatomically based multilevel surgery for obstructive sleep apnea. Otolaryngol Head Neck Surg. 2003 Oct;129(4):327-35.  4. Elgin MOLINA, Goldberg A.  Hypopharyngeal Surgery in Obstructive Sleep Apnea: An Evidence-Based Medicine Review. Arch Otolaryngol Head Neck Surg. 2006 Feb;132(2):206-13.  5. Amrita SANTIAGO et al. Upper-Airway Stimulation for Obstructive Sleep Apnea.  N Engl J Med. 2014 Jan 9;370(2):139-49.  6. Marissa Y et al. Increased Incidence of Cardiovascular Disease in Middle-aged Men with Obstructive Sleep Apnea. Am J Respir Crit Care Med; 2002 166: 159-165  7. Sofia EM et al. Studying Life Effects and Effectiveness of Palatopharyngoplasty (SLEEP) study: Subjective Outcomes of Isolated Uvulopalatopharyngoplasty. Otolaryngol Head Neck Surg. 2011; 144: 623-631.  Please, schedule sleep study and follow up visit with the nurse (she will coming into the room and meet with you). Use sleeping aid only if needed during the night of the study.    Thank you!    Fadi Fernandes MD, MPH  Clinical Sleep and Occupational / Environmental Medicine              Follow-ups after your visit        Future tests that were ordered for you today     Open Future Orders        Priority Expected Expires Ordered    HST-Home Sleep Apnea Test Routine  8/2/2018 1/31/2018            Who to contact     If you have questions or need follow up information about today's clinic visit or your schedule please contact [facility] directly at [phone].  Normal or non-critical lab and imaging results will be communicated to you by MyChart, letter or phone within 4 business days after the clinic has received the results. If you do not hear from us within 7 days, please contact the clinic through [employer] or phone. If you have a critical or abnormal lab result, we will notify you by phone as soon as possible.  Submit refill requests through [employer] or call your pharmacy and they will forward the refill request to us. Please allow 3 business days for your refill to be completed.          Additional Information About Your Visit        [employer] e-Tools Information     [employer] gives you  "secure access to your electronic health record. If you see a primary care provider, you can also send messages to your care team and make appointments. If you have questions, please call your primary care clinic.  If you do not have a primary care provider, please call [phone] and they will assist you.        Care EveryWhere ID     This is your Care EveryWhere ID. This could be used by other organizations to access your [facility] medical records  [id number]        Your Vitals Were     Pulse Height Pulse Oximetry BMI (Body Mass Index)          89 1.81 m (5' 11.25\") 97% 31.08 kg/m2         Blood Pressure from Last 3 Encounters:   01/31/18 116/83   01/08/18 126/79   07/27/17 120/80    Weight from Last 3 Encounters:   01/31/18 101.8 kg (224 lb 6.4 oz)   01/08/18 101.4 kg (223 lb 9.6 oz)   07/27/17 98 kg (216 lb)                 Today's Medication Changes          These changes are accurate as of 1/31/18 10:18 AM.  If you have any questions, ask your nurse or doctor.               Start taking these medicines.        Dose/Directions    zolpidem 5 MG tablet   Commonly known as:  AMBIEN   Used for:  Class 1 obesity due to excess calories without serious comorbidity with body mass index (BMI) of 31.0 to 31.9 in adult, Excessive daytime sleepiness, Witnessed episode of apnea, Snoring        Take tablet by mouth 15 minutes prior to sleep, for Sleep Study   Quantity:  1 tablet   Refills:  0            Where to get your medicines      Some of these will need a paper prescription and others can be bought over the counter.  Ask your nurse if you have questions.     Bring a paper prescription for each of these medications     zolpidem 5 MG tablet                Primary Care Provider Office Phone # Fax #    [facility] [phone] [address] [facility] [address]        Equal Access to Services     TANK MAHMOOD AH: Lan Neff, rico cotter, trevon hernandez, " moiz valdezalmas ruby'aan ah. So [facility] [phone].    ATENCIÓN: Si habla natalie, tiene a christianson disposición servicios gratuitos de asistencia lingüística. Misty castro [phone].    We comply with applicable federal civil rights laws and [place] laws. We do not discriminate on the basis of race, color, national origin, age, disability, sex, sexual orientation, or gender identity.            Thank you!     [facility] you [facility]. Our goal is always to provide you with excellent care. Hearing back from our patients is one way we can continue to improve our services. Please take a few minutes to complete the written survey that you may receive in the mail after your visit with us. Thank you!             Your Updated Medication List - Protect others around you: Learn how to safely use, store and throw away your medicines at www.disposemymeds.org.          This list is accurate as of 1/31/18 10:18 AM.  Always use your most recent med list.                   Brand Name Dispense Instructions for use Diagnosis    FLUoxetine 20 MG capsule    PROzac    90 capsule    TAKE 1 CAPSULE (20 MG) BY MOUTH DAILY    MELIDA (generalized anxiety disorder)       zolpidem 5 MG tablet    AMBIEN    1 tablet    Take tablet by mouth 15 minutes prior to sleep, for Sleep Study    Class 1 obesity due to excess calories without serious comorbidity with body mass index (BMI) of 31.0 to 31.9 in adult, Excessive daytime sleepiness, Witnessed episode of apnea, Snoring

## 2018-01-31 NOTE — PROGRESS NOTES
"[facility]  Outpatient Sleep Medicine Consultation  January 31, 2018    Name: Luisito Johnston MRN# [id number]   Age: 52 year old YOB: 1965     Date of Consultation: January 31, 2018  Consultation is requested by: No referring provider defined for this encounter.  Primary care provider: Laurie Rucker    Patient is accompanied by: Patient presents alone today.       Reason for Sleep Consult:     Luisito Johnston is a 52 year old male patient that presents here for an initial evaluation due to \"pauses breathing.\"         Assessment and Plan:     Summary Sleep Diagnoses:    (1) Snoring  (2) Witnessed Apneas  (3) Obesity  (4) EDS    Summary Recommendations / Discussion:    This patient has sxs, hx, and physical findings that suggest the diagnosis of a sleep disordered breathing. In addition, he has an intermediate pre-test probability of ALLAN. Given that he does not have any contraindications, a portable HST sleep study will be performed. Furthermore, based on the patient's history and physical examination, there is low suspicion for hypoventilation and, therefore, no TCM monitoring and/or ABG would be necessary at this point. Today, the nature and pathophysiology of ALLAN were discussed. The different treatment options for ALLAN were also reviewed and explained today. The patient was given zolpidem 5 mg to use only during the night of the study and only if needed (medication side effects and possible complications discussed). Lifestyle recommendations including healthy dietary and exercising habits were discussed. Pt will follow up with me after having completed a portable HST sleep study.    Although the patient reports some restlessness of the legs that wakes him up throughout the night just a few times a month, this is not quite consistent with RLS. In addition, the infrequent nature of this sxs are no likely to affect the effectiveness of the portable HST sleep study. Nonetheless, the " "patient is fully aware that an in-lab PSG sleep study may be required even after completing the portable HST.    Coding:  (R06.83) Snoring  (primary encounter diagnosis)  (R06.81) Witnessed episode of apnea  (G47.19) Excessive daytime sleepiness  (E66.09,  Z68.31) Class 1 obesity due to excess calories without serious comorbidity with body mass index (BMI) of 31.0 to 31.9 in adult    Counseling included a comprehensive review of diagnostic and therapeutic strategies as well as risks of inadequate therapy.    Educational materials provided in instructions. The patient was instructed to avoid driving or operating any heavy machinery when experiencing drowsiness.    All questions and concerns were addressed today. Pt agrees and understands the assessment and plan.         History of Present Illness:   Luisito Johnston is a 52 year old  RHD male pt with PMH of anxiety disorder presents for an initial evaluation today due to witnessed apneas and snoring.    This patient reports daily nocturnal loud snoring accompanied by gasping / choking for air with also witnessed apneas (pt explains that he can be heard from a different room). Pt tried in the past some OTC device for the snoring with some improvement (pt even sure device called \"PureSleep\"). The patient also endorses non-restorative sleep with sleep inertia. He also admits having some mild EDS with some inadvertent naps. This patient admits having some xerostomia and sore throat. The patient denies any morning cephalgia, morning myalgias, CP, SOB, N/V, diarrhea, nocturia, nocturnal coughing spells, positional dyspnea, orthopnea, or GERD sxs. The patient sleeps with one pillow under his head without any elevation of the head of the bed. Lastly, this patient denies any drowsiness when driving with no near accidents.     PREVIOUS IN- LAB or HOME SLEEP STUDIES: None Reported    SLEEP-WAKE SCHEDULE:     Luisito Johnston      -Describes himself as a night person; " "prefers to go to sleep at 12:00 AM and wakes up at 7:00 AM.      -Naps 1-2 times per week for  minutes, feels refreshed after naps; takes some inadvertent naps.      -ON WEEKDAYS, goes to sleep at 12:00 AM during the week; awakens 7:00 AM with an alarm; falls asleep in 10 minutes; denies difficulty falling asleep.      -ON WEEKENDS, goes to sleep at 1:00 AM and wakes up at 8:00 AM with an alarm; falls asleep in 10 minutes.        -Awakens 2-4 times a night for 30 minutes before falling back to sleep; awakens to \"gasping for air.\"      BEDTIME ACTIVITIES AND SHIFT WORK:    Luisito Johnston     -Bedtime Activities and Other Sleeping Information: Pt lives wife and daughter. Pt sleeps alone on a kristyn size bed (this is due to his snoring). Pt sleeps in all positions. Pt reads, watches TV, and uses cell phone / computer in bed.     -Occupation: Salesman. Pt works day shifts.    -Working Hours: 9 AM to 5 PM     SCALES        -SLEEP APNEA: [employer] score: 5 / 8        -SLEEPINESS: [place] sleepiness scale (ESS):  8 / 24    Drowsy driving / near accidents: Denies any near accidents    Consequences: EDS with non-restorative sleep    SLEEP COMPLAINTS:  Cardio-respiratory     -Snoring: Significant snoring reported    -Dyspnea: Pt admits having witnessed apneas   -Morning headaches or confusion: Denies any morning cephalgia   -Coexisting Lung disease: Denies diagnosed or known lung disease at this time     -Coexisting Heart disease: Denies diagnosed or known cardiovascular disease at this time     -Does patient have a bed partner: Patient sleeps alone due to snoring   -Has bed partner been sleeping separately because of snoring:  Yes            RLS Screen:    -When you try to relax in the evening or sleep at night, do you ever have unpleasant, restless feelings in your legs that can be relieved by walking or movement? The patient reports urges to move his legs that wakes him up throughout the night. This wakes the patient " up in about 7 nights a month. After waking up, the patient falls back asleep right away. This urge to move the legs are not present when going to sleep and does not keep the patient awake.     -Periodic limb movement: None Reported    Narcolepsy:     - Denies sudden urges of sleep attacks   - Denies cataplexy   - Denies sleep paralysis    - Denies hallucinations (pt reports very sporadic visual hallucinations when waking up. This is happening maybe twice a year).   - Admits feeling refreshed after a nap.    Sleep Behaviors:   - Admits leg symptoms/movements   - Denies motor restlessness   - Denies night terrors   - Admits bruxism (pt uses an OTC mouthguard)   - Denies automatic behaviors    Other Subjective Complaints:   - Denies anxiety or rumination    - Denies pain and discomfort at night   - Denies waking up with heart pounding or racing   - Denies GERD or aspiration         Parasomnia:    -NREM - Denies recurrent persistent confusional arousal, night eating, sleep walking or sleep terrors.      -REM - Denies dream enactment or injuries.     -Driving Accident or Near Accidents: None Reported         Medications:     Current Outpatient Prescriptions   Medication Sig     FLUoxetine (PROZAC) 20 MG capsule TAKE 1 CAPSULE (20 MG) BY MOUTH DAILY     [DISCONTINUED] FLUoxetine (PROZAC) 20 MG capsule TAKE 1 CAPSULE (20 MG) BY MOUTH DAILY     No current facility-administered medications for this visit.       No Known Allergies         Past Medical History:     Denies needing any 02 supplement at night.    Past Medical History:   Diagnosis Date     Arthritis     Mother and Father     Chronic sinusitis     Polyposis     Hyperlipidemia      Increased BMI      Psoriasis            Past Surgical History:    Denies previous upper airway surgery.     Past Surgical History:   Procedure Laterality Date     ARTHROSCOPY KNEE Right 7/14/2017    Procedure: ARTHROSCOPY KNEE;  Arthroscopic Menisectomy Right Knee;  Surgeon: Jim Lemon  MD Himanshu;  Location: [facility] OR     COLONOSCOPY N/A 3/9/2015    Procedure: COLONOSCOPY;  Surgeon: Denys Archuleta MD;  Location: [facility] OR     COLONOSCOPY WITH CO2 INSUFFLATION N/A 3/9/2015    Procedure: COLONOSCOPY WITH CO2 INSUFFLATION;  Surgeon: Denys Archuleta MD;  Location: [facility] OR     ENT SURGERY      polyps from nasal passage.      HC SHLDR ARTHROSCOP,PART ACROMIOPLAS  2000     SHOULDER SURGERY       SURGICAL HISTORY OF -   1991    Chronic Lt Shoulder Dislocation          Social History:     Social History   Substance Use Topics     Smoking status: Never Smoker     Smokeless tobacco: Never Used     Alcohol use Yes      Comment: very rare     Chemical History:     Tobacco: Never smoked     Uses 2 cups/day of coffee. Last caffeine intake is usually before 10 AM.    Supplements for wakefulness: Patient does not use any supplements to stay awake    EtOH: Only occasional use  Recreational Drugs: Patient denies using any recreational drugs     Psych Hx:   PHQ2: Negative   -Little Interest or pleasure in doing things? 0 - not at all   -Feeling down, depressed, or hopeless? 0 - not at all    Current dangers to self or others: No. Pt denies any SI / HI, hallucinations, or delusions         Family History:     Family History   Problem Relation Age of Onset     DIABETES Father      Respiratory Mother       Sleep Family Hx:       RLS - None reported   ALLAN - None reported  Insomnia - None reported  Parasomnia - Brother         Review of Systems:     A complete 10 point review of systems was negative other than HPI or as commented below:     A complete review of systems reviewed by me is negative with the exeption of what has been mentioned in the history of present illness.  CONSTITUTIONAL: NEGATIVE for weight gain/loss, fever, chills, sweats or night sweats, drug allergies.  EYES: NEGATIVE for changes in vision, blind spots, double vision.  ENT: NEGATIVE for ear pain, sore throat, sinus pain, post-nasal drip,  "runny nose, bloody nose  CARDIAC: NEGATIVE for fast heartbeats or fluttering in chest, chest pain or pressure, breathlessness when lying flat, swollen legs or swollen feet.  NEUROLOGIC: NEGATIVE headaches, weakness or numbness in the arms or legs.  DERMATOLOGIC:  POSITIVE for  rashes  PULMONARY:  POSITIVE for  SOB with activity, dry cough and productive cough  GASTROINTESTINAL: NEGATIVE for nausea or vomitting, loose or watery stools, fat or grease in stools, constipation, abdominal pain, bowel movements black in color or blood noted.  GENITOURINARY: NEGATIVE for pain during urination, blood in urine, urinating more frequently than usual, irregular menstrual periods.  MUSCULOSKELETAL: NEGATIVE for muscle pain, bone or joint pain, swollen joints.  ENDOCRINE: NEGATIVE for increased thirst or urination, diabetes.  LYMPHATIC: NEGATIVE for swollen lymph nodes, lumps or bumps in the breasts or nipple discharge.  MENTAL HEALTH: POSITIVE fore anxiety         Physical Examination:   /83  Pulse 89  Ht 1.81 m (5' 11.25\")  Wt 101.8 kg (224 lb 6.4 oz)  SpO2 97%  BMI 31.08 kg/m2     VS: Reviewed and normal.  General: Alert, oriented, not in distress. Dressed casually; Good eye contact; Comfortably sitting in a chair; in no apparent distress  HEENT: Normocephalic and atraumatic; NL TM x 2; pupils are isocoric and equally responsive to the light. PERRLA. EOMI. Normal fundoscopic examination; Nasal turbinates showed some swelling with mild deviation of the septal alignment;  Mallampati score: Grade III; Tonsillar hypertrophy: 0  surgically removed; Pharynx with no erythema or exudates. Small oropharynx with low-lying soft palate.  NECK: Neck supple; symmetrical; no lymphadenopathy; no thyromegaly, bruit, JVD noted. Neck circumference of 17.5 inches (44.5 cm).  Lungs: Both hemithoraces are symmetrical, normal to palpation, no dullness to percussion, auscultation of lungs revealed normal breath sounds with no expirium " prolongation, wheezing, rhonci and crackles.  CVS: Normal S1 and S2 heart sounds with no extra heart sounds. No murmur, rubs, or clicks. Normal peripheral pulses throughout with no obvious peripheral edema.  Abdomen: Bowel sounds present. Abdomen is soft, non-tender, and non-distended. No organomegaly, ascitis, or obvious masses noted. Negative CVA tenderness.  Extremities/musculoskeletal: No deformity, cyanosis, or clubbing noted.  Neurology: Awake, alert, and oriented x 3. No obvious gross motor / sensorial deficits with normal strength in all extremities at 5/5 and normal sensation throughout. Cranial nerves are grossly intact with normal II to XII CN functions. Negative Romberg's test with normal gait. DTR are symmetric and normal at 2+/4.  Integumentary: No obvious skin rash.  Psychiatry: Mood and affect are appropriate. Euthymic with affect congruent with full range and intensity. No SI/HI with adequate insight and judgement.          Data: All pertinent previous laboratory data reviewed     No results found for: PH, PHARTERIAL, PO2, [id number], SAT, PCO2, HCO3, BASEEXCESS, LAZARUS, BEB  Lab Results   Component Value Date    TSH 1.92 11/13/2013     Lab Results   Component Value Date    GLC 95 01/29/2015     Lab Results   Component Value Date    HGB 14.8 06/17/2017     No results found for: BUN, CR    Echocardiology: None Available    Chest x-ray:    -Chest x-rays from 2017 showed no acute cardiopulmonary abnormalities.    PFT: None Available    Laboratory Studies:   Component Value Flag Ref Range Units Status Collected Lab   WBC 11.8 (H) 4.0 - 11.0 10e9/L Final 06/17/2017  9:45 AM AN   RBC Count 4.77  4.4 - 5.9 10e12/L Final 06/17/2017  9:45 AM AN   Hemoglobin 14.8  13.3 - 17.7 g/dL Final 06/17/2017  9:45 AM AN   Hematocrit 45.4  40.0 - 53.0 % Final 06/17/2017  9:45 AM AN   MCV 95  78 - 100 fl Final 06/17/2017  9:45 AM AN   MCH 31.0  26.5 - 33.0 pg Final 06/17/2017  9:45 AM AN   MCHC 32.6  31.5 - 36.5 g/dL Final  06/17/2017  9:45 AM AN   RDW 13.5  10.0 - 15.0 % Final 06/17/2017  9:45 AM AN   Platelet Count 219  150 - 450 10e9/L Final 06/17/2017  9:45 AM AN     Fadi Fernandes MD, MPH  Clinical Sleep Medicine    Total time spent with patient: 60 min. Over >50% of the time was spent for face to face counseling, education, and evaluation.

## 2018-02-06 ENCOUNTER — OFFICE VISIT (OUTPATIENT)
Dept: SLEEP MEDICINE | Facility: CLINIC | Age: 53
End: 2018-02-06
Payer: COMMERCIAL

## 2018-02-06 DIAGNOSIS — E66.811 CLASS 1 OBESITY DUE TO EXCESS CALORIES WITHOUT SERIOUS COMORBIDITY WITH BODY MASS INDEX (BMI) OF 31.0 TO 31.9 IN ADULT: ICD-10-CM

## 2018-02-06 DIAGNOSIS — E66.09 CLASS 1 OBESITY DUE TO EXCESS CALORIES WITHOUT SERIOUS COMORBIDITY WITH BODY MASS INDEX (BMI) OF 31.0 TO 31.9 IN ADULT: ICD-10-CM

## 2018-02-06 DIAGNOSIS — R06.83 SNORING: ICD-10-CM

## 2018-02-06 DIAGNOSIS — G47.19 EXCESSIVE DAYTIME SLEEPINESS: ICD-10-CM

## 2018-02-06 DIAGNOSIS — R06.81 WITNESSED EPISODE OF APNEA: ICD-10-CM

## 2018-02-06 PROCEDURE — G0399 HOME SLEEP TEST/TYPE 3 PORTA: HCPCS | Performed by: FAMILY MEDICINE

## 2018-02-06 NOTE — MR AVS SNAPSHOT
After Visit Summary   2/6/2018    Luisito Johnston    MRN: [id number]           Patient Information     Date Of Birth          1965        Visit Information        Provider Department      2/6/2018 9:15 AM SLEEP LAB, BED FIVE [facility]        Today's Diagnoses     Class 1 obesity due to excess calories without serious comorbidity with body mass index (BMI) of 31.0 to 31.9 in adult        Excessive daytime sleepiness        Witnessed episode of apnea        Snoring           Follow-ups after your visit        Your next 10 appointments already scheduled     Feb 08, 2018  9:00 AM CST   HST Drop Off with SLEEP LAB, BED FIVE   [facility] ([facility])    [address] [address]   [phone]            Feb 14, 2018 11:30 AM CST   Return Sleep Patient with Fadi Fernandes MD   [facility] ([facility])    [address] [address]   [phone]              Who to contact     If you have questions or need follow up information about today's clinic visit or your schedule please contact [facility] directly at [phone].  Normal or non-critical lab and imaging results will be communicated to you by MyChart, letter or phone within 4 business days after the clinic has received the results. If you do not hear from us within 7 days, please contact the clinic through MyChart or phone. If you have a critical or abnormal lab result, we will notify you by phone as soon as possible.  Submit refill requests through [employer] or call your pharmacy and they will forward the refill request to us. Please allow 3 business days for your refill to be completed.          Additional Information About Your Visit        [email or web address] Information     [employer] gives you secure access to your electronic health record. If you see a primary care provider, you can also send  messages to your care team and make appointments. If you have questions, please call your primary care clinic.  If you do not have a primary care provider, please call [phone] and they will assist you.        Care EveryWhere ID     This is your Care EveryWhere ID. This could be used by other organizations to access your [facility] medical records  [id number]         Blood Pressure from Last 3 Encounters:   01/31/18 116/83   01/08/18 126/79   07/27/17 120/80    Weight from Last 3 Encounters:   01/31/18 101.8 kg (224 lb 6.4 oz)   01/08/18 101.4 kg (223 lb 9.6 oz)   07/27/17 98 kg (216 lb)              We Performed the Following     HST-Home Sleep Apnea Test        Primary Care Provider Office Phone # Fax #    [facility] [phone] [address] [address]        Equal Access to Services     TANK MAHMOOD : Hadii aad ku hadasho Sotegan, waaxda luqadaha, qaybta kaalmada adeegyada, moiz king . So [facility] [phone].    ATENCIÓN: Si habla español, tiene a christianson disposición servicios gratuitos de asistencia lingüística. Llame al [phone].    We comply with applicable federal civil rights laws and [place] laws. We do not discriminate on the basis of race, color, national origin, age, disability, sex, sexual orientation, or gender identity.            Thank you!     Thank you for choosing [facility]  for your care. Our goal is always to provide you with excellent care. Hearing back from our patients is one way we can continue to improve our services. Please take a few minutes to complete the written survey that you may receive in the mail after your visit with us. Thank you!             Your Updated Medication List - Protect others around you: Learn how to safely use, store and throw away your medicines at www.disposemymeds.org.          This list is accurate as of 2/6/18  9:23 AM.  Always use your most recent med list.                    Brand Name Dispense Instructions for use Diagnosis    FLUoxetine 20 MG capsule    PROzac    90 capsule    TAKE 1 CAPSULE (20 MG) BY MOUTH DAILY    MELIDA (generalized anxiety disorder)       zolpidem 5 MG tablet    AMBIEN    1 tablet    Take tablet by mouth 15 minutes prior to sleep, for Sleep Study    Class 1 obesity due to excess calories without serious comorbidity with body mass index (BMI) of 31.0 to 31.9 in adult, Excessive daytime sleepiness, Witnessed episode of apnea, Snoring

## 2018-02-06 NOTE — PROGRESS NOTES
SUBJECTIVE:  Chief Complaint   Patient presents with     Sleep Study      home sleep test         OBJECTIVE:  home sleep apnea test ordered.    Instructions were reviewed with Luisito and were also printed for Luisito to take with him.   Luisito verbalized understanding and demonstrated use very well.     ASSESSMENT/PLAN:  Luisito received home sleep apnea test today February 6, 2018. Pt will use device and will return on 2/9/18

## 2018-02-08 ENCOUNTER — DOCUMENTATION ONLY (OUTPATIENT)
Dept: SLEEP MEDICINE | Facility: CLINIC | Age: 53
End: 2018-02-08
Payer: COMMERCIAL

## 2018-02-09 NOTE — PROCEDURES
HOME SLEEP STUDY INTERPRETATION    Patient: Luisito Johnston  MRN: [id number]  YOB: 1965  Study Date: 2/6/2018  Ordering Provider: Fadi Fernandes MD, MPH     Indications for Home Study: Luisito Johnston is a 52 year-old male with a past medical history of anxiety disorder who presents with symptoms suggestive of obstructive sleep apnea.    Patient's Characteristics:   Weight: 101.6 kg   Height: 181.1 cm   BMI: 31.0 kg/m2   Age: 52 years old   [place] Score: 8    Data: A full night home sleep study was performed recording the standard physiologic parameters including body position, movement, sound, nasal pressure, thermal oral airflow, chest and abdominal movements with respiratory inductance plethysmography, and oxygen saturation by pulse oximetry. Pulse rate was estimated by oximetry recording. This study was considered adequate based on > 4 hours of quality oximetry and respiratory recording.     Recording Information:  Analysis Time: 600.0 minutes  Time in Bed: 565.6 minutes   Estimated sleep efficiency: 97.3%.   Time spent in supine position: 60.0% of total bed time (339.6 minutes)  Time spent in non-supine positions: 39.9% of total bed time (226.0 minutes)    Oximetry Analysis:   Sleep Associated Hypoxemia: Sustained hypoxemia was present. Baseline oxygen saturation was 92.6%. Time with saturation less than or equal to 88% was 9.4 minutes. The lowest oxygen saturation was 83.0%.     Respiratory Analysis  Snoring: Snoring was present.  Respiratory events: The home study revealed a presence of 44.2 obstructive apneas and 0.1 mixed events, and 0.0 central apneas. There were 151 hypopneas resulting in a combined apnea / hypopnea index (AHI) of 60.4 events per hour. The AHI supine was 98.6 events per hour, whereas the AHI non-supine was 2.9 events per hour. The overall respiratory disturbance index (RDI) was 60.4 events per hour. The supine RDI was 98.6 events per hour and the non-supine RDI was 2.9  events per hour.    Pattern: Excluding events noted above, respiratory rate and pattern was normal.    Position: Percent of time spent: supine- 60.0%; Prone- 0.0%; On left side- 39.9%; On right side- 0.0%.    Cardiac Analysis:  Maximum Pulse Rate: 97.0 beats per minute (bpm)  Minimum Pulse Rate: 50.0 beats per minute (bpm)  Average Pulse Rate: 60.0 beats per minute (bpm)  Time Spent Above 100 bpm: 0.0 minutes  Time Spent Below 40 bpm: 0.0 minutes    Heart Rate: By pulse oximetry, normal rate was noted (normal pulse rate during sleep between 40 bpm and 90 bpm).     Assessment:    (1) Severe, supine-predominant obstructive sleep apnea.   (2) Sleep associated hypoxemia was present.    Recommendations:   1. Based on the presence of severe obstructive sleep apnea and excessive daytime sleepiness, treatment could be empirically initiated with an auto-titrating PAP therapy with a range of 5 cmH2O - 15 cmH2O. Recommend clinical follow up with sleep management team.  2.   Given the supine nature of the condition, if PAP therapy is not an acceptable initial therapy, positional therapy may be considered.   3. Alternatively, if PAP therapy is not an acceptable treatment modality, the patient may be a candidate for dental appliance through referral to Sleep Dentistry for the treatment of obstructive sleep apnea and/or socially disruptive snoring.  4. If devices are not acceptable or effective, patient may benefit from evaluation of possible surgical options.  If he is interested, would recommend referral to specialized ENT-Sleep provider.  5. Advise regarding the risks of drowsy driving.  6. Suggest optimizing sleep schedule and avoiding sleep deprivation.  7. Weight management (if BMI > 30).  8. Avoid sedating medications, including narcotics and alcohol, as these may exacerbate sleep apnea and/or underlying respiratory disorders.    Diagnosis Code(s):    (1) Obstructive Sleep Apnea G47.33   (2) Hypoxemia G47.36    It is  important to note that portable HST sleep studies may tend to underestimate the true severity of the condition. Also, it is important to highlight that an in-lab titration PSG sleep study or even another in-lab PSG sleep study may be necessary to adequately understand or treat the condition.    Fadi Fernandes MD, MPH, 02/09/2018  Diplomate, [employer], Sleep Medicine

## 2018-02-14 ENCOUNTER — OFFICE VISIT (OUTPATIENT)
Dept: SLEEP MEDICINE | Facility: CLINIC | Age: 53
End: 2018-02-14
Payer: COMMERCIAL

## 2018-02-14 VITALS
DIASTOLIC BLOOD PRESSURE: 84 MMHG | SYSTOLIC BLOOD PRESSURE: 125 MMHG | BODY MASS INDEX: 31.36 KG/M2 | HEIGHT: 71 IN | WEIGHT: 224 LBS | OXYGEN SATURATION: 100 % | HEART RATE: 76 BPM

## 2018-02-14 DIAGNOSIS — E66.09 CLASS 1 OBESITY DUE TO EXCESS CALORIES WITHOUT SERIOUS COMORBIDITY WITH BODY MASS INDEX (BMI) OF 31.0 TO 31.9 IN ADULT: ICD-10-CM

## 2018-02-14 DIAGNOSIS — G47.33 OSA (OBSTRUCTIVE SLEEP APNEA): Primary | ICD-10-CM

## 2018-02-14 DIAGNOSIS — G47.19 EXCESSIVE DAYTIME SLEEPINESS: ICD-10-CM

## 2018-02-14 DIAGNOSIS — G47.34 NOCTURNAL HYPOXEMIA: ICD-10-CM

## 2018-02-14 DIAGNOSIS — E66.811 CLASS 1 OBESITY DUE TO EXCESS CALORIES WITHOUT SERIOUS COMORBIDITY WITH BODY MASS INDEX (BMI) OF 31.0 TO 31.9 IN ADULT: ICD-10-CM

## 2018-02-14 PROCEDURE — 99215 OFFICE O/P EST HI 40 MIN: CPT | Performed by: FAMILY MEDICINE

## 2018-02-14 NOTE — NURSING NOTE
"Chief Complaint   Patient presents with     Study Results     discuss home sleep test       Initial /84  Pulse 76  Ht 1.81 m (5' 11.25\")  Wt 101.6 kg (224 lb)  SpO2 100%  BMI 31.02 kg/m2 Estimated body mass index is 31.02 kg/(m^2) as calculated from the following:    Height as of this encounter: 1.81 m (5' 11.25\").    Weight as of this encounter: 101.6 kg (224 lb).  Medication Reconciliation: complete   "

## 2018-02-14 NOTE — PROGRESS NOTES
[facility]  Outpatient Sleep Medicine Follow Up Visit  February 14, 2018    Name: Luisito Johnston MRN# [id number]   Age: 52 year old YOB: 1965     Date of Follow Up Visit: February 14, 2018  Consultation is requested by: No referring provider defined for this encounter.  Primary care provider: Laurie Rucker    Patient is accompanied by: Patient presents alone today.       Reason for Sleep Consult:     Luisito Johnston is a 52 year old male patient that presents here for a follow up evaluation after completing a portable HST sleep study.         Assessment and Plan:     Summary Sleep Diagnoses:    (1) Severe ALLAN (AHI of 60.4 events per hour)  (2) Sleep Associated Hypoxemia (9.4 minutes below SpO2 of 88%)  (3) Obesity  (4) EDS    Summary Recommendations / Discussion:    During the initial visit, this patient had sxs, hx, and physical findings that suggested the diagnosis of a sleep disordered breathing. In addition, he had an intermediate pre-test probability of ALLAN. Given that he did not have any contraindications, a portable HST sleep study was performed. Furthermore, based on the patient's history and physical examination, there was low suspicion for hypoventilation and, therefore, no TCM monitoring and/or ABG was necessary. The portable HST sleep study demonstrated severe, supine predominant ALLAN with some sleep associated hypoxemia (AHI was 60.4 events per hour and the pt spent 9.4 minutes under the SpO2 of 88%). Today, the study results were carefully discussed and explained. The nature and pathophysiology of ALLAN were discussed. The different treatment options for ALLAN were also reviewed and explained today. After much discussion of the different treatment alternatives, the patient opted to start PAP therapy. As such, he was prescribed an APAP with minimum pressure of 5 cmH2O and maximum pressure of 15 cmH2O. PAP compliance was discussed and encouraged. Lifestyle  recommendations including healthy dietary and exercising habits were discussed. Pt will follow up with me after having completed 6 to 8 weeks of PAP therapy.    As mentioned before, although the patient reports some restlessness of the legs that wakes him up throughout the night just a few times a month, this is not quite consistent with RLS. In addition, the infrequent nature of this sxs are no likely to affect the effectiveness of the portable HST sleep study. Nonetheless, the patient is fully aware that an in-lab PSG sleep study may be required even after completing the portable HST.    Coding:  (R06.83) Snoring  (primary encounter diagnosis)  (R06.81) Witnessed episode of apnea  (G47.19) Excessive daytime sleepiness  (E66.09,  Z68.31) Class 1 obesity due to excess calories without serious comorbidity with body mass index (BMI) of 31.0 to 31.9 in adult    Counseling included a comprehensive review of diagnostic and therapeutic strategies as well as risks of inadequate therapy.    Educational materials provided in instructions. The patient was instructed to avoid driving or operating any heavy machinery when experiencing drowsiness.    All questions and concerns were addressed today. Pt agrees and understands the assessment and plan.         History of Present Illness:   Luisito Johnston is a 52 year old  RHD male pt with PMH of anxiety disorder presents for a follow up evaluation today after completing a portable HST sleep study due to witnessed apneas and snoring.    During the initial visit, the patient reported daily nocturnal loud snoring accompanied by gasping / choking for air with also witnessed apneas (pt explains that he can be heard from a different room). Pt tried in the past some OTC device for the snoring with some improvement. The patient also endorsed non-restorative sleep with sleep inertia. He also admitted having some mild EDS with some inadvertent naps. This patient admitted having some  "xerostomia and sore throat. The patient denied any morning cephalgia, morning myalgias, CP, SOB, N/V, diarrhea, nocturia, nocturnal coughing spells, positional dyspnea, orthopnea, or GERD sxs. The patient sleeps with one pillow under his head without any elevation of the head of the bed. Lastly, this patient denied any drowsiness when driving with no near accidents.     The patient completed a portable HST sleep study on 02/06/2018 that demonstrated severe supine predominant ALLAN with some sleep associated hypoxemia (AHI was 60.4 events per hour and the patient spent 9.4 minutes under the SpO2 of 88%).     The patient denies any new sxs or concerns today.    PREVIOUS IN- LAB or HOME SLEEP STUDIES: None Reported    SLEEP-WAKE SCHEDULE:     Luisito Johnston      -Describes himself as a night person; prefers to go to sleep at 12:00 AM and wakes up at 7:00 AM.      -Naps 1-2 times per week for  minutes, feels refreshed after naps; takes some inadvertent naps.      -ON WEEKDAYS, goes to sleep at 12:00 AM during the week; awakens 7:00 AM with an alarm; falls asleep in 10 minutes; denies difficulty falling asleep.      -ON WEEKENDS, goes to sleep at 1:00 AM and wakes up at 8:00 AM with an alarm; falls asleep in 10 minutes.        -Awakens 2-4 times a night for 30 minutes before falling back to sleep; awakens to \"gasping for air.\"      BEDTIME ACTIVITIES AND SHIFT WORK:    Luisito Johnston     -Bedtime Activities and Other Sleeping Information: Pt lives wife and daughter. Pt sleeps alone on a kristyn size bed (this is due to his snoring). Pt sleeps in all positions. Pt reads, watches TV, and uses cell phone / computer in bed.     -Occupation: Salesman. Pt works day shifts.    -Working Hours: 9 AM to 5 PM     SCALES        -SLEEP APNEA: [place]ang score: 5 / 8        -SLEEPINESS: [place] sleepiness scale (ESS):  8 / 24 (initial score)    Drowsy driving / near accidents: Denies any near accidents    Consequences: EDS with " non-restorative sleep    SLEEP COMPLAINTS:  Cardio-respiratory     -Snoring: Significant snoring reported    -Dyspnea: Pt admits having witnessed apneas   -Morning headaches or confusion: Denies any morning cephalgia   -Coexisting Lung disease: Denies diagnosed or known lung disease at this time     -Coexisting Heart disease: Denies diagnosed or known cardiovascular disease at this time     -Does patient have a bed partner: Patient sleeps alone due to snoring   -Has bed partner been sleeping separately because of snoring:  Yes            RLS Screen:    -When you try to relax in the evening or sleep at night, do you ever have unpleasant, restless feelings in your legs that can be relieved by walking or movement? The patient reports urges to move his legs that wakes him up throughout the night. This wakes the patient up in about 7 nights a month. After waking up, the patient falls back asleep right away. This urge to move the legs are not present when going to sleep and does not keep the patient awake.     -Periodic limb movement: None Reported    Narcolepsy:     - Denies sudden urges of sleep attacks   - Denies cataplexy   - Denies sleep paralysis    - Denies hallucinations (pt reports very sporadic visual hallucinations when waking up. This is happening maybe twice a year).   - Admits feeling refreshed after a nap.    Sleep Behaviors:   - Admits leg symptoms/movements   - Denies motor restlessness   - Denies night terrors   - Admits bruxism (pt uses an OTC mouthguard)   - Denies automatic behaviors    Other Subjective Complaints:   - Denies anxiety or rumination    - Denies pain and discomfort at night   - Denies waking up with heart pounding or racing   - Denies GERD or aspiration         Parasomnia:    -NREM - Denies recurrent persistent confusional arousal, night eating, sleep walking or sleep terrors.      -REM - Denies dream enactment or injuries.     -Driving Accident or Near Accidents: None Reported          Medications:     Current Outpatient Prescriptions   Medication Sig     FLUoxetine (PROZAC) 20 MG capsule TAKE 1 CAPSULE (20 MG) BY MOUTH DAILY     No current facility-administered medications for this visit.       No Known Allergies         Past Medical History:     Denies needing any 02 supplement at night.    Past Medical History:   Diagnosis Date     Arthritis     Mother and Father     Chronic sinusitis     Polyposis     Hyperlipidemia      Increased BMI      Psoriasis            Past Surgical History:    Denies previous upper airway surgery.     Past Surgical History:   Procedure Laterality Date     ARTHROSCOPY KNEE Right 7/14/2017    Procedure: ARTHROSCOPY KNEE;  Arthroscopic Menisectomy Right Knee;  Surgeon: Jim Lemon MD;  Location: [facility] OR     COLONOSCOPY N/A 3/9/2015    Procedure: COLONOSCOPY;  Surgeon: Denys Archuleta MD;  Location: [facility] OR     COLONOSCOPY WITH CO2 INSUFFLATION N/A 3/9/2015    Procedure: COLONOSCOPY WITH CO2 INSUFFLATION;  Surgeon: Denys Archuleta MD;  Location: [facility] OR     ENT SURGERY      polyps from nasal passage.      HC SHLDR ARTHROSCOP,PART ACROMIOPLAS  2000     SHOULDER SURGERY       SURGICAL HISTORY OF -   1991    Chronic Lt Shoulder Dislocation          Social History:     Social History   Substance Use Topics     Smoking status: Never Smoker     Smokeless tobacco: Never Used     Alcohol use Yes      Comment: very rare     Chemical History:     Tobacco: Never smoked     Uses 2 cups/day of coffee. Last caffeine intake is usually before 10 AM.    Supplements for wakefulness: Patient does not use any supplements to stay awake    EtOH: Only occasional use  Recreational Drugs: Patient denies using any recreational drugs     Psych Hx:   PHQ2: Negative   -Little Interest or pleasure in doing things? 0 - not at all   -Feeling down, depressed, or hopeless? 0 - not at all    Current dangers to self or others: No. Pt denies any SI / HI, hallucinations, or delusions        "  Family History:     Family History   Problem Relation Age of Onset     DIABETES Father      Respiratory Mother       Sleep Family Hx:       RLS - None reported   ALLAN - None reported  Insomnia - None reported  Parasomnia - Brother         Review of Systems:     A complete 10 point review of systems was negative other than HPI or as commented below:     A complete review of systems reviewed by me is negative with the exeption of what has been mentioned in the history of present illness.  CONSTITUTIONAL: NEGATIVE for weight gain/loss, fever, chills, sweats or night sweats, drug allergies.  EYES: NEGATIVE for changes in vision, blind spots, double vision.  ENT: NEGATIVE for ear pain, sore throat, sinus pain, post-nasal drip, runny nose, bloody nose  CARDIAC: NEGATIVE for fast heartbeats or fluttering in chest, chest pain or pressure, breathlessness when lying flat, swollen legs or swollen feet.  NEUROLOGIC: NEGATIVE headaches, weakness or numbness in the arms or legs.  DERMATOLOGIC:  POSITIVE for  rashes  PULMONARY:  POSITIVE for  SOB with activity, dry cough and productive cough  GASTROINTESTINAL: NEGATIVE for nausea or vomitting, loose or watery stools, fat or grease in stools, constipation, abdominal pain, bowel movements black in color or blood noted.  GENITOURINARY: NEGATIVE for pain during urination, blood in urine, urinating more frequently than usual, irregular menstrual periods.  MUSCULOSKELETAL: NEGATIVE for muscle pain, bone or joint pain, swollen joints.  ENDOCRINE: NEGATIVE for increased thirst or urination, diabetes.  LYMPHATIC: NEGATIVE for swollen lymph nodes, lumps or bumps in the breasts or nipple discharge.  MENTAL HEALTH: POSITIVE fore anxiety         Physical Examination:   /84  Pulse 76  Ht 1.81 m (5' 11.25\")  Wt 101.6 kg (224 lb)  SpO2 100%  BMI 31.02 kg/m2     VS: Reviewed and normal.  General: Alert, oriented, not in distress. Dressed casually; Good eye contact; Comfortably sitting " in a chair; in no apparent distress  Lungs: Both hemithoraces are symmetrical, normal to palpation, no dullness to percussion, auscultation of lungs revealed normal breath sounds with no expirium prolongation, wheezing, rhonci and crackles.  CVS: Normal S1 and S2 heart sounds with no extra heart sounds. No murmur, rubs, or clicks. Normal peripheral pulses throughout with no obvious peripheral edema.  Psychiatry: Mood and affect are appropriate. Euthymic with affect congruent with full range and intensity. No SI/HI with adequate insight and judgement.          Data: All pertinent previous laboratory data reviewed     No results found for: PH, PHARTERIAL, PO2, PU1HKBDBKTS, SAT, PCO2, HCO3, BASEEXCESS, LAZARUS, BEB  Lab Results   Component Value Date    TSH 1.92 11/13/2013     Lab Results   Component Value Date    GLC 95 01/29/2015     Lab Results   Component Value Date    HGB 14.8 06/17/2017     No results found for: BUN, CR    Echocardiology: None Available    Chest x-ray:    -Chest x-rays from 2017 showed no acute cardiopulmonary abnormalities.    PFT: None Available    Laboratory Studies:   Component Value Flag Ref Range Units Status Collected Lab   WBC 11.8 (H) 4.0 - 11.0 10e9/L Final 06/17/2017  9:45 AM AN   RBC Count 4.77  4.4 - 5.9 10e12/L Final 06/17/2017  9:45 AM AN   Hemoglobin 14.8  13.3 - 17.7 g/dL Final 06/17/2017  9:45 AM AN   Hematocrit 45.4  40.0 - 53.0 % Final 06/17/2017  9:45 AM AN   MCV 95  78 - 100 fl Final 06/17/2017  9:45 AM AN   MCH 31.0  26.5 - 33.0 pg Final 06/17/2017  9:45 AM AN   MCHC 32.6  31.5 - 36.5 g/dL Final 06/17/2017  9:45 AM AN   RDW 13.5  10.0 - 15.0 % Final 06/17/2017  9:45 AM AN   Platelet Count 219  150 - 450 10e9/L Final 06/17/2017  9:45 AM AN     Fadi Fernandes MD, MPH  Clinical Sleep Medicine    Total time spent with patient: 40 min. Over >50% of the time was spent for face to face counseling, education, and evaluation.

## 2018-02-14 NOTE — PATIENT INSTRUCTIONS
Your BMI is Body mass index is 31.02 kg/(m^2).  Weight management is a personal decision.  If you are interested in exploring weight loss strategies, the following discussion covers the approaches that may be successful. Body mass index (BMI) is one way to tell whether you are at a healthy weight, overweight, or obese. It measures your weight in relation to your height.  A BMI of 18.5 to 24.9 is in the healthy range. A person with a BMI of 25 to 29.9 is considered overweight, and someone with a BMI of 30 or greater is considered obese. More than two-thirds of [place] adults are considered overweight or obese.  Being overweight or obese increases the risk for further weight gain. Excess weight may lead to heart disease and diabetes.  Creating and following plans for healthy eating and physical activity may help you improve your health.  Weight control is part of healthy lifestyle and includes exercise, emotional health, and healthy eating habits. Careful eating habits lifelong are the mainstay of weight control. Though there are significant health benefits from weight loss, long-term weight loss with diet alone may be very difficult to achieve- studies show long-term success with dietary management in less than 10% of people. Attaining a healthy weight may be especially difficult to achieve in those with severe obesity. In some cases, medications, devices and surgical management might be considered.  What can you do?  If you are overweight or obese and are interested in methods for weight loss, you should discuss this with your provider.     Consider reducing daily calorie intake by 500 calories.     Keep a food journal.     Avoiding skipping meals, consider cutting portions instead.    Diet combined with exercise helps maintain muscle while optimizing fat loss. Strength training is particularly important for building and maintaining muscle mass. Exercise helps reduce stress, increase energy, and improves fitness.  Increasing exercise without diet control, however, may not burn enough calories to loose weight.       Start walking three days a week 10-20 minutes at a time    Work towards walking thirty minutes five days a week     Eventually, increase the speed of your walking for 1-2 minutes at time    In addition, we recommend that you review healthy lifestyles and methods for weight loss available through the National Institutes of Health patient information sites:  http://win.niddk.nih.gov/publications/index.htm    And look into health and wellness programs that may be available through your health insurance provider, employer, local community center, or [employer].    Weight management plan: Patient was referred to their PCP to discuss a diet and exercise plan.   1. CPAP-  WHAT DOES IT DO AND HOW CAN I LEARN TO WEAR IT?                               BEFORE I START, CAN I WATCH A MOVIE TO GET A PLAN ON HOW TO USE CPAP?  https://www.b-datum.com/watch?e=r1A68zu249F      Continuous positive airway pressure, or CPAP, is the most effective treatment for obstructive sleep apnea. It works by blowing room air, through a mask, to hold your throat open. A decision to use CPAP is a major step forward in the pursuit of a healthier life. The successful use of CPAP will help you breathe easier, sleep better and live healthier. You can choose CPAP equipment from any durable medical equipment provider that meets your needs.  Using CPAP can be a positive experience if you keep these edward points in mind:  1. Commitment  CPAP is not a quick fix for your problem. It involves a long-term commitment to improve your sleep and your health.    2. Communication  Stay in close communication with both your sleep doctor and your CPAP supplier. Ask lots of questions and seek help when you need it.    3. Consistency  Use CPAP all night, every night and for every nap. You will receive the maximum health benefits from CPAP when you use it every time that  "you sleep. This will also make it easier for your body to adjust to the treatment.    4. Correction  The first machine and mask that you try may not be the best ones for you. Work with your sleep doctor and your CPAP supplier to make corrections to your equipment selection. Ask about trying a different type of machine or mask if you have ongoing problems. Make sure that your mask is a good fit and learn to use your equipment properly.    5. Challenge  Tell a family member or close friend to ask you each morning if you used your CPAP the previous night. Have someone to challenge you to give it your best effort.    6. Connection   Your adjustment to CPAP will be easier if you are able to connect with others who use the same treatment. Ask your sleep doctor if there is a support group in your area for people who have sleep apnea, or look for one on the Internet.  7. Comfort   Increase your level of comfort by using a saline spray, decongestant or heated humidifier if CPAP irritates your nose, mouth or throat. Use your unit's \"ramp\" setting to slowly get used to the air pressure level. There may be soft pads you can buy that will fit over your mask straps. Look on www.CPAP.com for accessories that can help make CPAP use more comfortable.  8. Cleaning   Clean your mask, tubing and headgear on a regular basis. Put this time in your schedule so that you don't forget to do it. Check and replace the filters for your CPAP unit and humidifier.    9. Completion   Although you are never finished with CPAP therapy, you should reward yourself by celebrating the completion of your first month of treatment. Expect this first month to be your hardest period of adjustment. It will involve some trial and error as you find the machine, mask and pressure settings that are right for you.    10. Continuation  After your first month of treatment, continue to make a daily commitment to use your CPAP all night, every night and for every " nap.    CPAP-Tips to starting with success:  Begin using your CPAP for short periods of time during the day while you watch TV or read.    Use CPAP every night and for every nap. Using it less often reduces the health benefits and makes it harder for your body to get used to it.    Make small adjustments to your mask, tubing, straps and headgear until you get the right fit. Tightening the mask may actually worsen the leak.  If it leaves significant marks on your face or irritates the bridge of your nose, it may not be the best mask for you.  Speak with the person who supplied the mask and consider trying other masks. Insurances will allow you to try different masks during the first month of starting CPAP.  Insurance also covers a new mask, hose and filter about every 6 months.    Use a saline nasal spray to ease mild nasal congestion. Neti-Pot or saline nasal rinses may also help. Nasal gel sprays can help reduce nasal dryness.  Biotene mouthwash can be helpful to protect your teeth if you experience frequent dry mouth.  Dry mouth may be a sign of air escaping out of your mouth or out of the mask in the case of a full face mask.  Speak with your provider if you expect that is the case.     Take a nasal decongestant to relieve more severe nasal or sinus congestion.  Do not use Afrin (oxymetazoline) nasal spray more than 3 days in a row.  Speak with your sleep doctor if your nasal congestion is chronic.    Use a heated humidifier that fits your CPAP model to enhance your breathing comfort. Adjust the heat setting up if you get a dry nose or throat, down if you get condensation in the hose or mask.  Position the CPAP lower than you so that any condensation in the hose drains back into the machine rather than towards the mask.    Try a system that uses nasal pillows if traditional masks give you problems.    Clean your mask, tubing and headgear once a week. Make sure the equipment dries fully.    Regularly check and  replace the filters for your CPAP unit and humidifier.    Work closely with your sleep provider and your CPAP supplier to make sure that you have the machine, mask and air pressure setting that works best for you. It is better to stop using it and call your provider to solve problems than to lay awake all night frustrated with the device.    Daytime naps are not advised, but use the PAP device if taking naps. Many insurances require that we prove you are using the PAP device at least 4 hours on at least 70% of nights over a 30 day period. We have 90 days to meet those criteria.    You can get new supplies (mask, hose and filter) for your device every 3-6 months (covered by insurance). You do not need to get supplies that often, but they are available if you would like them. You may exchange the mask once within the first month if you feel the initial mask does not fit well. Please, contact your medical equipment provider for equipment issues.    Please let me know if you have any snoring, daytime sleepiness, or poor sleep quality. We will want to make sure your PAP device is adequately treating your condition.    There is a website called CPAP.com that has accessories that may make CPAP use easier. Please visit it at your convenience.    Please, schedule follow up visit with the nurse (she will coming into the room and meet with you).     Thank you!    Fadi Fernandes MD, MPH  Clinical Sleep and Occupational / Environmental Medicine

## 2018-02-14 NOTE — MR AVS SNAPSHOT
After Visit Summary   2/14/2018    Luisito Johnston    MRN: [id number]           Patient Information     Date Of Birth          1965        Visit Information        Provider Department      2/14/2018 11:30 AM Fadi Fernandes MD [facility]        Today's Diagnoses     ALLAN (obstructive sleep apnea)    -  1    Excessive daytime sleepiness        Class 1 obesity due to excess calories without serious comorbidity with body mass index (BMI) of 31.0 to 31.9 in adult        Nocturnal hypoxemia          Care Instructions      Your BMI is Body mass index is 31.02 kg/(m^2).  Weight management is a personal decision.  If you are interested in exploring weight loss strategies, the following discussion covers the approaches that may be successful. Body mass index (BMI) is one way to tell whether you are at a healthy weight, overweight, or obese. It measures your weight in relation to your height.  A BMI of 18.5 to 24.9 is in the healthy range. A person with a BMI of 25 to 29.9 is considered overweight, and someone with a BMI of 30 or greater is considered obese. More than two-thirds of American adults are considered overweight or obese.  Being overweight or obese increases the risk for further weight gain. Excess weight may lead to heart disease and diabetes.  Creating and following plans for healthy eating and physical activity may help you improve your health.  Weight control is part of healthy lifestyle and includes exercise, emotional health, and healthy eating habits. Careful eating habits lifelong are the mainstay of weight control. Though there are significant health benefits from weight loss, long-term weight loss with diet alone may be very difficult to achieve- studies show long-term success with dietary management in less than 10% of people. Attaining a healthy weight may be especially difficult to achieve in those with severe obesity. In some cases, medications, devices and  surgical management might be considered.  What can you do?  If you are overweight or obese and are interested in methods for weight loss, you should discuss this with your provider.     Consider reducing daily calorie intake by 500 calories.     Keep a food journal.     Avoiding skipping meals, consider cutting portions instead.    Diet combined with exercise helps maintain muscle while optimizing fat loss. Strength training is particularly important for building and maintaining muscle mass. Exercise helps reduce stress, increase energy, and improves fitness. Increasing exercise without diet control, however, may not burn enough calories to loose weight.       Start walking three days a week 10-20 minutes at a time    Work towards walking thirty minutes five days a week     Eventually, increase the speed of your walking for 1-2 minutes at time    In addition, we recommend that you review healthy lifestyles and methods for weight loss available through the National Institutes of Health patient information sites:  http://win.niddk.nih.gov/publications/index.htm    And look into health and wellness programs that may be available through your health insurance provider, employer, local community center, or [employer].    Weight management plan: Patient was referred to their PCP to discuss a diet and exercise plan.   1. CPAP-  WHAT DOES IT DO AND HOW CAN I LEARN TO WEAR IT?                               BEFORE I START, CAN I WATCH A MOVIE TO GET A PLAN ON HOW TO USE CPAP?  https://www.youtube.com/watch?z=z7O41xf900N      Continuous positive airway pressure, or CPAP, is the most effective treatment for obstructive sleep apnea. It works by blowing room air, through a mask, to hold your throat open. A decision to use CPAP is a major step forward in the pursuit of a healthier life. The successful use of CPAP will help you breathe easier, sleep better and live healthier. You can choose CPAP equipment from any [employer]  "equipment provider that meets your needs.  Using CPAP can be a positive experience if you keep these edward points in mind:  1. Commitment  CPAP is not a quick fix for your problem. It involves a long-term commitment to improve your sleep and your health.    2. Communication  Stay in close communication with both your sleep doctor and your CPAP supplier. Ask lots of questions and seek help when you need it.    3. Consistency  Use CPAP all night, every night and for every nap. You will receive the maximum health benefits from CPAP when you use it every time that you sleep. This will also make it easier for your body to adjust to the treatment.    4. Correction  The first machine and mask that you try may not be the best ones for you. Work with your sleep doctor and your CPAP supplier to make corrections to your equipment selection. Ask about trying a different type of machine or mask if you have ongoing problems. Make sure that your mask is a good fit and learn to use your equipment properly.    5. Challenge  Tell a family member or close friend to ask you each morning if you used your CPAP the previous night. Have someone to challenge you to give it your best effort.    6. Connection   Your adjustment to CPAP will be easier if you are able to connect with others who use the same treatment. Ask your sleep doctor if there is a support group in your area for people who have sleep apnea, or look for one on the Internet.  7. Comfort   Increase your level of comfort by using a saline spray, decongestant or heated humidifier if CPAP irritates your nose, mouth or throat. Use your unit's \"ramp\" setting to slowly get used to the air pressure level. There may be soft pads you can buy that will fit over your mask straps. Look on www.CPAP.com for accessories that can help make CPAP use more comfortable.  8. Cleaning   Clean your mask, tubing and headgear on a regular basis. Put this time in your schedule so that you don't forget to " do it. Check and replace the filters for your CPAP unit and humidifier.    9. Completion   Although you are never finished with CPAP therapy, you should reward yourself by celebrating the completion of your first month of treatment. Expect this first month to be your hardest period of adjustment. It will involve some trial and error as you find the machine, mask and pressure settings that are right for you.    10. Continuation  After your first month of treatment, continue to make a daily commitment to use your CPAP all night, every night and for every nap.    CPAP-Tips to starting with success:  Begin using your CPAP for short periods of time during the day while you watch TV or read.    Use CPAP every night and for every nap. Using it less often reduces the health benefits and makes it harder for your body to get used to it.    Make small adjustments to your mask, tubing, straps and headgear until you get the right fit. Tightening the mask may actually worsen the leak.  If it leaves significant marks on your face or irritates the bridge of your nose, it may not be the best mask for you.  Speak with the person who supplied the mask and consider trying other masks. Insurances will allow you to try different masks during the first month of starting CPAP.  Insurance also covers a new mask, hose and filter about every 6 months.    Use a saline nasal spray to ease mild nasal congestion. Neti-Pot or saline nasal rinses may also help. Nasal gel sprays can help reduce nasal dryness.  Biotene mouthwash can be helpful to protect your teeth if you experience frequent dry mouth.  Dry mouth may be a sign of air escaping out of your mouth or out of the mask in the case of a full face mask.  Speak with your provider if you expect that is the case.     Take a nasal decongestant to relieve more severe nasal or sinus congestion.  Do not use Afrin (oxymetazoline) nasal spray more than 3 days in a row.  Speak with your sleep doctor if  your nasal congestion is chronic.    Use a heated humidifier that fits your CPAP model to enhance your breathing comfort. Adjust the heat setting up if you get a dry nose or throat, down if you get condensation in the hose or mask.  Position the CPAP lower than you so that any condensation in the hose drains back into the machine rather than towards the mask.    Try a system that uses nasal pillows if traditional masks give you problems.    Clean your mask, tubing and headgear once a week. Make sure the equipment dries fully.    Regularly check and replace the filters for your CPAP unit and humidifier.    Work closely with your sleep provider and your CPAP supplier to make sure that you have the machine, mask and air pressure setting that works best for you. It is better to stop using it and call your provider to solve problems than to lay awake all night frustrated with the device.    Daytime naps are not advised, but use the PAP device if taking naps. Many insurances require that we prove you are using the PAP device at least 4 hours on at least 70% of nights over a 30 day period. We have 90 days to meet those criteria.    You can get new supplies (mask, hose and filter) for your device every 3-6 months (covered by insurance). You do not need to get supplies that often, but they are available if you would like them. You may exchange the mask once within the first month if you feel the initial mask does not fit well. Please, contact your medical equipment provider for equipment issues.    Please let me know if you have any snoring, daytime sleepiness, or poor sleep quality. We will want to make sure your PAP device is adequately treating your condition.    There is a website called CPAP.com that has accessories that may make CPAP use easier. Please visit it at your convenience.    Please, schedule follow up visit with the nurse (she will coming into the room and meet with you).     Thank you!    Fadi Fernandes  "MD, MPH  Clinical Sleep and Occupational / Environmental Medicine                Follow-ups after your visit        Who to contact     If you have questions or need follow up information about today's clinic visit or your schedule please contact [facility] directly at [phone].  Normal or non-critical lab and imaging results will be communicated to you by MyChart, letter or phone within 4 business days after the clinic has received the results. If you do not hear from us within 7 days, please contact the clinic through [employer] or phone. If you have a critical or abnormal lab result, we will notify you by phone as soon as possible.  Submit refill requests through [employer] or call your pharmacy and they will forward the refill request to us. Please allow 3 business days for your refill to be completed.          Additional Information About Your Visit        MyChart Information     [employer] gives you secure access to your electronic health record. If you see a primary care provider, you can also send messages to your care team and make appointments. If you have questions, please call your primary care clinic.  If you do not have a primary care provider, please call [phone] and they will assist you.        Care EveryWhere ID     This is your Care EveryWhere ID. This could be used by other organizations to access your [facility] medical records  [id number]        Your Vitals Were     Pulse Height Pulse Oximetry BMI (Body Mass Index)          76 1.81 m (5' 11.25\") 100% 31.02 kg/m2         Blood Pressure from Last 3 Encounters:   02/14/18 125/84   01/31/18 116/83   01/08/18 126/79    Weight from Last 3 Encounters:   02/14/18 101.6 kg (224 lb)   01/31/18 101.8 kg (224 lb 6.4 oz)   01/08/18 101.4 kg (223 lb 9.6 oz)              We Performed the Following     Comprehensive DME        Primary Care Provider Office Phone # Fax #    [facility] [phone]       [id number] " [facility] [id number]        Equal Access to Services     [facility] TIMOTEO : Hadii aad ku hadmeganglenn Neff, wagautamda wendivaleriyha, qajayrota gerrymamoiz pardo. So [facility] [phone].    ATENCIÓN: Si habla español, tiene a christianson disposición servicios gratuitos de asistencia lingüística. Llame al [phone].    We comply with applicable federal civil rights laws and [place] laws. We do not discriminate on the basis of race, color, national origin, age, disability, sex, sexual orientation, or gender identity.            Thank you!     Thank you for choosing [facility]  for your care. Our goal is always to provide you with excellent care. Hearing back from our patients is one way we can continue to improve our services. Please take a few minutes to complete the written survey that you may receive in the mail after your visit with us. Thank you!             Your Updated Medication List - Protect others around you: Learn how to safely use, store and throw away your medicines at www.disposemymeds.org.          This list is accurate as of 2/14/18 12:02 PM.  Always use your most recent med list.                   Brand Name Dispense Instructions for use Diagnosis    FLUoxetine 20 MG capsule    PROzac    90 capsule    TAKE 1 CAPSULE (20 MG) BY MOUTH DAILY    MELIDA (generalized anxiety disorder)

## 2018-02-15 ENCOUNTER — TELEPHONE (OUTPATIENT)
Dept: SLEEP MEDICINE | Facility: CLINIC | Age: 53
End: 2018-02-15

## 2018-02-15 NOTE — TELEPHONE ENCOUNTER
SUBJECTIVE:  Chief Complaint   Patient presents with     Sleep Problem     Outside vendor set up request        OBJECTIVE:  Received a call from Luisito requesting to get pap therapy equipment through outside vendor.   He would like to use [facility] because it is two blocks from his home.    Insurance face sheet, Sleep Study, Office Visit Note, and Epic Order faxed to [facility] at [phone]. [facility] tel:[phone]    ASSESSMENT/PLAN:  Will wait to get set up information to update chart.

## 2018-02-15 NOTE — TELEPHONE ENCOUNTER
Reason for call:  Other   Patient called regarding (reason for call): prescription  Additional comments: Patient needs his sleep prescription faxed over to another provider to obtain cpap supply     Phone number to reach patient:  Home number on file [phone] (home)    Best Time:  Anytime     Can we leave a detailed message on this number?  YES

## 2018-04-04 ENCOUNTER — OFFICE VISIT (OUTPATIENT)
Dept: SLEEP MEDICINE | Facility: CLINIC | Age: 53
End: 2018-04-04
Payer: COMMERCIAL

## 2018-04-04 VITALS
OXYGEN SATURATION: 93 % | WEIGHT: 221 LBS | DIASTOLIC BLOOD PRESSURE: 82 MMHG | SYSTOLIC BLOOD PRESSURE: 124 MMHG | HEART RATE: 90 BPM | RESPIRATION RATE: 14 BRPM | HEIGHT: 71 IN | BODY MASS INDEX: 30.94 KG/M2

## 2018-04-04 DIAGNOSIS — E66.811 CLASS 1 OBESITY DUE TO EXCESS CALORIES WITHOUT SERIOUS COMORBIDITY WITH BODY MASS INDEX (BMI) OF 31.0 TO 31.9 IN ADULT: ICD-10-CM

## 2018-04-04 DIAGNOSIS — G47.34 NOCTURNAL HYPOXEMIA: ICD-10-CM

## 2018-04-04 DIAGNOSIS — G47.33 OSA (OBSTRUCTIVE SLEEP APNEA): Primary | ICD-10-CM

## 2018-04-04 DIAGNOSIS — E66.09 CLASS 1 OBESITY DUE TO EXCESS CALORIES WITHOUT SERIOUS COMORBIDITY WITH BODY MASS INDEX (BMI) OF 31.0 TO 31.9 IN ADULT: ICD-10-CM

## 2018-04-04 DIAGNOSIS — G47.19 EXCESSIVE DAYTIME SLEEPINESS: ICD-10-CM

## 2018-04-04 PROCEDURE — 99214 OFFICE O/P EST MOD 30 MIN: CPT | Performed by: FAMILY MEDICINE

## 2018-04-04 NOTE — PATIENT INSTRUCTIONS
Your BMI is Body mass index is 30.82 kg/(m^2).  Weight management is a personal decision.  If you are interested in exploring weight loss strategies, the following discussion covers the approaches that may be successful. Body mass index (BMI) is one way to tell whether you are at a healthy weight, overweight, or obese. It measures your weight in relation to your height.  A BMI of 18.5 to 24.9 is in the healthy range. A person with a BMI of 25 to 29.9 is considered overweight, and someone with a BMI of 30 or greater is considered obese. More than two-thirds of [place] adults are considered overweight or obese.  Being overweight or obese increases the risk for further weight gain. Excess weight may lead to heart disease and diabetes.  Creating and following plans for healthy eating and physical activity may help you improve your health.  Weight control is part of healthy lifestyle and includes exercise, emotional health, and healthy eating habits. Careful eating habits lifelong are the mainstay of weight control. Though there are significant health benefits from weight loss, long-term weight loss with diet alone may be very difficult to achieve- studies show long-term success with dietary management in less than 10% of people. Attaining a healthy weight may be especially difficult to achieve in those with severe obesity. In some cases, medications, devices and surgical management might be considered.  What can you do?  If you are overweight or obese and are interested in methods for weight loss, you should discuss this with your provider.     Consider reducing daily calorie intake by 500 calories.     Keep a food journal.     Avoiding skipping meals, consider cutting portions instead.    Diet combined with exercise helps maintain muscle while optimizing fat loss. Strength training is particularly important for building and maintaining muscle mass. Exercise helps reduce stress, increase energy, and improves fitness.  Increasing exercise without diet control, however, may not burn enough calories to loose weight.       Start walking three days a week 10-20 minutes at a time    Work towards walking thirty minutes five days a week     Eventually, increase the speed of your walking for 1-2 minutes at time    In addition, we recommend that you review healthy lifestyles and methods for weight loss available through the [employer] of [employer] patient information sites:  http://win.niddk.nih.gov/publications/index.htm    And look into health and wellness programs that may be available through your health insurance provider, employer, local community center, or [employer].    Weight management plan: Patient was referred to their PCP to discuss a diet and exercise plan.    1. CPAP-  WHAT DOES IT DO AND HOW CAN I LEARN TO WEAR IT?                               BEFORE I START, CAN I WATCH A MOVIE TO GET A PLAN ON HOW TO USE CPAP?  https://www.[employer].com/watch?c=k4O94ig765H      Continuous positive airway pressure, or CPAP, is the most effective treatment for obstructive sleep apnea. It works by blowing room air, through a mask, to hold your throat open. A decision to use CPAP is a major step forward in the pursuit of a healthier life. The successful use of CPAP will help you breathe easier, sleep better and live healthier. You can choose CPAP equipment from any durable medical equipment provider that meets your needs.  Using CPAP can be a positive experience if you keep these edward points in mind:  1. Commitment  CPAP is not a quick fix for your problem. It involves a long-term commitment to improve your sleep and your health.    2. Communication  Stay in close communication with both your sleep doctor and your CPAP supplier. Ask lots of questions and seek help when you need it.    3. Consistency  Use CPAP all night, every night and for every nap. You will receive the maximum health benefits from CPAP when you use it every time that  "you sleep. This will also make it easier for your body to adjust to the treatment.    4. Correction  The first machine and mask that you try may not be the best ones for you. Work with your sleep doctor and your CPAP supplier to make corrections to your equipment selection. Ask about trying a different type of machine or mask if you have ongoing problems. Make sure that your mask is a good fit and learn to use your equipment properly.    5. Challenge  Tell a family member or close friend to ask you each morning if you used your CPAP the previous night. Have someone to challenge you to give it your best effort.    6. Connection   Your adjustment to CPAP will be easier if you are able to connect with others who use the same treatment. Ask your sleep doctor if there is a support group in your area for people who have sleep apnea, or look for one on the Internet.  7. Comfort   Increase your level of comfort by using a saline spray, decongestant or heated humidifier if CPAP irritates your nose, mouth or throat. Use your unit's \"ramp\" setting to slowly get used to the air pressure level. There may be soft pads you can buy that will fit over your mask straps. Look on www.CPAP.com for accessories that can help make CPAP use more comfortable.  8. Cleaning   Clean your mask, tubing and headgear on a regular basis. Put this time in your schedule so that you don't forget to do it. Check and replace the filters for your CPAP unit and humidifier.    9. Completion   Although you are never finished with CPAP therapy, you should reward yourself by celebrating the completion of your first month of treatment. Expect this first month to be your hardest period of adjustment. It will involve some trial and error as you find the machine, mask and pressure settings that are right for you.    10. Continuation  After your first month of treatment, continue to make a daily commitment to use your CPAP all night, every night and for every " nap.    CPAP-Tips to starting with success:  Begin using your CPAP for short periods of time during the day while you watch TV or read.    Use CPAP every night and for every nap. Using it less often reduces the health benefits and makes it harder for your body to get used to it.    Make small adjustments to your mask, tubing, straps and headgear until you get the right fit. Tightening the mask may actually worsen the leak.  If it leaves significant marks on your face or irritates the bridge of your nose, it may not be the best mask for you.  Speak with the person who supplied the mask and consider trying other masks. Insurances will allow you to try different masks during the first month of starting CPAP.  Insurance also covers a new mask, hose and filter about every 6 months.    Use a saline nasal spray to ease mild nasal congestion. Neti-Pot or saline nasal rinses may also help. Nasal gel sprays can help reduce nasal dryness.  Biotene mouthwash can be helpful to protect your teeth if you experience frequent dry mouth.  Dry mouth may be a sign of air escaping out of your mouth or out of the mask in the case of a full face mask.  Speak with your provider if you expect that is the case.     Take a nasal decongestant to relieve more severe nasal or sinus congestion.  Do not use Afrin (oxymetazoline) nasal spray more than 3 days in a row.  Speak with your sleep doctor if your nasal congestion is chronic.    Use a heated humidifier that fits your CPAP model to enhance your breathing comfort. Adjust the heat setting up if you get a dry nose or throat, down if you get condensation in the hose or mask.  Position the CPAP lower than you so that any condensation in the hose drains back into the machine rather than towards the mask.    Try a system that uses nasal pillows if traditional masks give you problems.    Clean your mask, tubing and headgear once a week. Make sure the equipment dries fully.    Regularly check and  replace the filters for your CPAP unit and humidifier.    Work closely with your sleep provider and your CPAP supplier to make sure that you have the machine, mask and air pressure setting that works best for you. It is better to stop using it and call your provider to solve problems than to lay awake all night frustrated with the device.    Daytime naps are not advised, but use the PAP device if taking naps. Many insurances require that we prove you are using the PAP device at least 4 hours on at least 70% of nights over a 30 day period. We have 90 days to meet those criteria.    You can get new supplies (mask, hose and filter) for your device every 3-6 months (covered by insurance). You do not need to get supplies that often, but they are available if you would like them. You may exchange the mask once within the first month if you feel the initial mask does not fit well. Please, contact your medical equipment provider for equipment issues.    Please let me know if you have any snoring, daytime sleepiness, or poor sleep quality. We will want to make sure your PAP device is adequately treating your condition.    There is a website called CPAP.com that has accessories that may make CPAP use easier. Please visit it at your convenience.    Follow up with me within one year. Continue the good work!    Thanks!    Fadi Fernandes MD, MPH  Clinical Sleep and Occupational / Environmental Medicine

## 2018-04-04 NOTE — NURSING NOTE
"Chief Complaint   Patient presents with     Sleep Apnea     cpap follow up. 2 month       Initial /82  Pulse 90  Resp 14  Ht 1.803 m (5' 11\")  Wt 100.2 kg (221 lb)  SpO2 93%  BMI 30.82 kg/m2 Estimated body mass index is 30.82 kg/(m^2) as calculated from the following:    Height as of this encounter: 1.803 m (5' 11\").    Weight as of this encounter: 100.2 kg (221 lb).  Medication Reconciliation: complete   Devika Hagan MA    "

## 2018-04-04 NOTE — PROGRESS NOTES
[facility]  Outpatient Sleep Medicine Follow Up Visit  April 4, 2018    Name: Luisito Johnston MRN# [id number]   Age: 52 year old YOB: 1965     Date of Follow Up Visit: April 4, 2018  Consultation is requested by: No referring provider defined for this encounter.  Primary care provider: Laurie Rucker    Patient is accompanied by: Patient presents alone today.       Reason for Sleep Consult:     Luisito Johnston is a 52 year old male patient that presents here for an ALLAN follow up evaluation.         Assessment and Plan:     Summary Sleep Diagnoses:    (1) Severe ALLAN (AHI of 60.4 events per hour)  (2) Sleep Associated Hypoxemia (9.4 minutes below SpO2 of 88%)  (3) Obesity  (4) EDS    Summary Recommendations / Discussion:    During the initial visit, this patient had sxs, hx, and physical findings that suggested the diagnosis of a sleep disordered breathing. In addition, he had an intermediate pre-test probability of ALLAN. Given that he did not have any contraindications, a portable HST sleep study was performed. Furthermore, based on the patient's history and physical examination, there was low suspicion for hypoventilation and, therefore, no TCM monitoring and/or ABG was necessary. The portable HST sleep study demonstrated severe, supine predominant ALLAN with some sleep associated hypoxemia (AHI was 60.4 events per hour and the pt spent 9.4 minutes under the SpO2 of 88%). Last visit, after much discussion of the different treatment alternatives, the patient was started on PAP therapy. He was prescribed an APAP with minimum pressure of 5 cmH2O and maximum pressure of 15 cmH2O. The PAP download today shows a compliant patient and a subjective and objectively effective PAP therapy with a residual AHI of only 3.7 events per hour. As such, current therapy will be continued (i.e., APAP at 5 - 15 cmH2O). PAP compliance was discussed and encouraged. Lifestyle recommendations including  healthy dietary and exercising habits were discussed. Pt will follow up with me within one year.    Coding:  (G47.33) ALLAN (obstructive sleep apnea)  (primary encounter diagnosis)  (G47.34) Nocturnal hypoxemia  (E66.09,  Z68.31) Class 1 obesity due to excess calories without serious comorbidity with body mass index (BMI) of 31.0 to 31.9 in adult  (G47.19) Excessive daytime sleepiness    Counseling included a comprehensive review of diagnostic and therapeutic strategies as well as risks of inadequate therapy.    Educational materials provided in instructions. The patient was instructed to avoid driving or operating any heavy machinery when experiencing drowsiness.    All questions and concerns were addressed today. Pt agrees and understands the assessment and plan.         History of Present Illness:   Luisito Johnston is a 52 year old  RHD male pt with PMH of anxiety disorder presents for an ALLAN follow up evaluation today.    During the initial visit, the patient reported daily nocturnal loud snoring accompanied by gasping / choking for air with also witnessed apneas (pt explains that he can be heard from a different room). Pt tried in the past some OTC device for the snoring with some improvement. The patient also endorsed non-restorative sleep with sleep inertia. He also admitted having some mild EDS with some inadvertent naps. This patient admitted having some xerostomia and sore throat. The patient denied any morning cephalgia, morning myalgias, CP, SOB, N/V, diarrhea, nocturia, nocturnal coughing spells, positional dyspnea, orthopnea, or GERD sxs. The patient sleeps with one pillow under his head without any elevation of the head of the bed. Lastly, this patient denied any drowsiness when driving with no near accidents.    The patient completed a portable HST sleep study on 02/06/2018 that demonstrated severe supine predominant ALLAN with some sleep associated hypoxemia (AHI was 60.4 events per hour and  "the patient spent 9.4 minutes under the SpO2 of 88%).     During the last visit, the patient was placed on an APAP at 5 - 15 cmH2O. The PAP download today shows a compliant patient who is using the device 100% of the time with 100% over 4 hours. The device is effective at treating the condition with a residual AHI of 3.7 events per hour. The patient 95th percentile pressure is 11.6 cmH2O. No significant leaks noted (1 minutes 4 seconds).    CPAP Compliance:   Dates: April 4, 2018       100 % >4hour use    Average Use: 7 hours 12 minutes   Leak: 1 minutes 4 seconds    Residual AHI: 3.7 events per hour    The patient likes the PAP device with improved sleep quality and improved EDS. Pt denies any snoring, gasping / choking for air, or witnessed apneas with PAP device. No aerophagia, bloating, CP, SOB, N/V, diarrhea, or any other sxs or concerns reported. Pt is using a nasal pillow interface with no integumentary discomfort or leaks. Pt denies any drowsiness when driving.    The patient denies any new sxs or concerns today.    PREVIOUS IN- LAB or HOME SLEEP STUDIES: None Reported    SLEEP-WAKE SCHEDULE:     Luisito Johnston      -Describes himself as a night person; prefers to go to sleep at 12:00 AM and wakes up at 7:00 AM.      -Naps 1-2 times per week for  minutes, feels refreshed after naps; takes some inadvertent naps.      -ON WEEKDAYS, goes to sleep at 12:00 AM during the week; awakens 7:00 AM with an alarm; falls asleep in 10 minutes; denies difficulty falling asleep.      -ON WEEKENDS, goes to sleep at 1:00 AM and wakes up at 8:00 AM with an alarm; falls asleep in 10 minutes.        -Awakens 2-4 times a night for 30 minutes before falling back to sleep; awakens to \"gasping for air.\"      BEDTIME ACTIVITIES AND SHIFT WORK:    Luisito Johnston     -Bedtime Activities and Other Sleeping Information: Pt lives wife and daughter. Pt sleeps alone on a kristyn size bed (this is due to his snoring). Pt sleeps in all " positions. Pt reads, watches TV, and uses cell phone / computer in bed.     -Occupation: Salesman. Pt works day shifts.    -Working Hours: 9 AM to 5 PM     SCALES        -SLEEP APNEA: [place] score: 5 / 8        -SLEEPINESS: [place] sleepiness scale (ESS):  8 / 24 (initial score)                                                  0 / 24 (04/04/2018)    Drowsy driving / near accidents: Denies any near accidents    Consequences: EDS with non-restorative sleep    SLEEP COMPLAINTS:  Cardio-respiratory     -Snoring: Not with PAP device   -Dyspnea: Not with PAP device   -Morning headaches or confusion: Denies any morning cephalgia   -Coexisting Lung disease: Denies diagnosed or known lung disease at this time     -Coexisting Heart disease: Denies diagnosed or known cardiovascular disease at this time     -Does patient have a bed partner: Patient sleeps alone due to snoring   -Has bed partner been sleeping separately because of snoring:  Yes            RLS Screen:    -When you try to relax in the evening or sleep at night, do you ever have unpleasant, restless feelings in your legs that can be relieved by walking or movement? The patient reports urges to move his legs that wakes him up throughout the night. This wakes the patient up in about 7 nights a month. After waking up, the patient falls back asleep right away. This urge to move the legs are not present when going to sleep and does not keep the patient awake.     -Periodic limb movement: None Reported    Narcolepsy:     - Denies sudden urges of sleep attacks   - Denies cataplexy   - Denies sleep paralysis    - Denies hallucinations (pt reports very sporadic visual hallucinations when waking up. This is happening maybe twice a year).   - Admits feeling refreshed after a nap.    Sleep Behaviors:   - Admits leg symptoms/movements   - Denies motor restlessness   - Denies night terrors   - Admits bruxism (pt uses an OTC mouthguard)   - Denies automatic behaviors    Other  Subjective Complaints:   - Denies anxiety or rumination    - Denies pain and discomfort at night   - Denies waking up with heart pounding or racing   - Denies GERD or aspiration         Parasomnia:    -NREM - Denies recurrent persistent confusional arousal, night eating, sleep walking or sleep terrors.      -REM - Denies dream enactment or injuries.     -Driving Accident or Near Accidents: None Reported         Medications:     Current Outpatient Prescriptions   Medication Sig     FLUoxetine (PROZAC) 20 MG capsule TAKE 1 CAPSULE (20 MG) BY MOUTH DAILY     No current facility-administered medications for this visit.       No Known Allergies         Past Medical History:     Denies needing any 02 supplement at night.    Past Medical History:   Diagnosis Date     Arthritis     Mother and Father     Chronic sinusitis     Polyposis     Hyperlipidemia      Increased BMI      Psoriasis            Past Surgical History:    Denies previous upper airway surgery.     Past Surgical History:   Procedure Laterality Date     ARTHROSCOPY KNEE Right 7/14/2017    Procedure: ARTHROSCOPY KNEE;  Arthroscopic Menisectomy Right Knee;  Surgeon: Jim Lemon MD;  Location: [facility] OR     COLONOSCOPY N/A 3/9/2015    Procedure: COLONOSCOPY;  Surgeon: Denys Archuleta MD;  Location: [facility] OR     COLONOSCOPY WITH CO2 INSUFFLATION N/A 3/9/2015    Procedure: COLONOSCOPY WITH CO2 INSUFFLATION;  Surgeon: Denys Archuleta MD;  Location: [facility] OR     ENT SURGERY      polyps from nasal passage.      HC SHLDR ARTHROSCOP,PART ACROMIOPLAS  2000     SHOULDER SURGERY       SURGICAL HISTORY OF -   1991    Chronic Lt Shoulder Dislocation          Social History:     Social History   Substance Use Topics     Smoking status: Never Smoker     Smokeless tobacco: Never Used     Alcohol use Yes      Comment: very rare     Chemical History:     Tobacco: Never smoked     Uses 2 cups/day of coffee. Last caffeine intake is usually before 10 AM.     Supplements for wakefulness: Patient does not use any supplements to stay awake    EtOH: Only occasional use  Recreational Drugs: Patient denies using any recreational drugs     Psych Hx:   PHQ2: Negative   -Little Interest or pleasure in doing things? 0 - not at all   -Feeling down, depressed, or hopeless? 0 - not at all    Current dangers to self or others: No. Pt denies any SI / HI, hallucinations, or delusions         Family History:     Family History   Problem Relation Age of Onset     DIABETES Father      Respiratory Mother       Sleep Family Hx:       RLS - None reported   ALLAN - None reported  Insomnia - None reported  Parasomnia - Brother         Review of Systems:     A complete 10 point review of systems was negative other than HPI or as commented below:     A complete review of systems reviewed by me is negative with the exeption of what has been mentioned in the history of present illness.  CONSTITUTIONAL: NEGATIVE for weight gain/loss, fever, chills, sweats or night sweats, drug allergies.  EYES: NEGATIVE for changes in vision, blind spots, double vision.  ENT: NEGATIVE for ear pain, sore throat, sinus pain, post-nasal drip, runny nose, bloody nose  CARDIAC: NEGATIVE for fast heartbeats or fluttering in chest, chest pain or pressure, breathlessness when lying flat, swollen legs or swollen feet.  NEUROLOGIC: NEGATIVE headaches, weakness or numbness in the arms or legs.  DERMATOLOGIC:  POSITIVE for  rashes  PULMONARY:  POSITIVE for  SOB with activity, dry cough and productive cough  GASTROINTESTINAL: NEGATIVE for nausea or vomitting, loose or watery stools, fat or grease in stools, constipation, abdominal pain, bowel movements black in color or blood noted.  GENITOURINARY: NEGATIVE for pain during urination, blood in urine, urinating more frequently than usual, irregular menstrual periods.  MUSCULOSKELETAL: NEGATIVE for muscle pain, bone or joint pain, swollen joints.  ENDOCRINE: NEGATIVE for increased  "thirst or urination, diabetes.  LYMPHATIC: NEGATIVE for swollen lymph nodes, lumps or bumps in the breasts or nipple discharge.  MENTAL HEALTH: POSITIVE fore anxiety         Physical Examination:   /82  Pulse 90  Resp 14  Ht 1.803 m (5' 11\")  Wt 100.2 kg (221 lb)  SpO2 93%  BMI 30.82 kg/m2     VS: Reviewed and normal.  General: Alert, oriented, not in distress. Dressed casually; Good eye contact; Comfortably sitting in a chair; in no apparent distress.  HEENT: Normocephalic and atraumatic; NL TM x 2; pupils are isocoric and equally responsive to the light. PERRLA. EOMI. Normal fundoscopic examination; Nasal turbinates showed some swelling with mild deviation of the septal alignment;  Mallampati score: Grade III; Tonsillar hypertrophy: 0  surgically removed; Pharynx with no erythema or exudates. Small oropharynx with low-lying soft palate.  NECK: Neck supple; symmetrical; no lymphadenopathy; no thyromegaly, bruit, JVD noted. Neck circumference of 17.5 inches (44.5 cm).  Lungs: Both hemithoraces are symmetrical, normal to palpation, no dullness to percussion, auscultation of lungs revealed normal breath sounds with no expirium prolongation, wheezing, rhonci and crackles.  CVS: Normal S1 and S2 heart sounds with no extra heart sounds. No murmur, rubs, or clicks. Normal peripheral pulses throughout with no obvious peripheral edema.  Psychiatry: Mood and affect are appropriate. Euthymic with affect congruent with full range and intensity. No SI/HI with adequate insight and judgement.          Data: All pertinent previous laboratory data reviewed     No results found for: PH, PHARTERIAL, PO2, [id number], SAT, PCO2, HCO3, BASEEXCESS, LAZARUS, BEB  Lab Results   Component Value Date    TSH 1.92 11/13/2013     Lab Results   Component Value Date    GLC 95 01/29/2015     Lab Results   Component Value Date    HGB 14.8 06/17/2017     No results found for: BUN, CR    Echocardiology: None Available    Chest x-ray: "    -Chest x-rays from 2017 showed no acute cardiopulmonary abnormalities.    PFT: None Available    Laboratory Studies:   Component Value Flag Ref Range Units Status Collected Lab   WBC 11.8 (H) 4.0 - 11.0 10e9/L Final 06/17/2017  9:45 AM AN   RBC Count 4.77  4.4 - 5.9 10e12/L Final 06/17/2017  9:45 AM AN   Hemoglobin 14.8  13.3 - 17.7 g/dL Final 06/17/2017  9:45 AM AN   Hematocrit 45.4  40.0 - 53.0 % Final 06/17/2017  9:45 AM AN   MCV 95  78 - 100 fl Final 06/17/2017  9:45 AM AN   MCH 31.0  26.5 - 33.0 pg Final 06/17/2017  9:45 AM AN   MCHC 32.6  31.5 - 36.5 g/dL Final 06/17/2017  9:45 AM AN   RDW 13.5  10.0 - 15.0 % Final 06/17/2017  9:45 AM AN   Platelet Count 219  150 - 450 10e9/L Final 06/17/2017  9:45 AM AN     Fadi Fernandes MD, MPH  Clinical Sleep Medicine    Total time spent with patient: 30 min. Over >50% of the time was spent for face to face counseling, education, and evaluation.

## 2018-04-04 NOTE — MR AVS SNAPSHOT
After Visit Summary   4/4/2018    Luisito Johnston    MRN: [id number]           Patient Information     Date Of Birth          1965        Visit Information        Provider Department      4/4/2018 9:00 AM Fadi Fernandes MD [facility] Instructions      Your BMI is Body mass index is 30.82 kg/(m^2).  Weight management is a personal decision.  If you are interested in exploring weight loss strategies, the following discussion covers the approaches that may be successful. Body mass index (BMI) is one way to tell whether you are at a healthy weight, overweight, or obese. It measures your weight in relation to your height.  A BMI of 18.5 to 24.9 is in the healthy range. A person with a BMI of 25 to 29.9 is considered overweight, and someone with a BMI of 30 or greater is considered obese. More than two-thirds of American adults are considered overweight or obese.  Being overweight or obese increases the risk for further weight gain. Excess weight may lead to heart disease and diabetes.  Creating and following plans for healthy eating and physical activity may help you improve your health.  Weight control is part of healthy lifestyle and includes exercise, emotional health, and healthy eating habits. Careful eating habits lifelong are the mainstay of weight control. Though there are significant health benefits from weight loss, long-term weight loss with diet alone may be very difficult to achieve- studies show long-term success with dietary management in less than 10% of people. Attaining a healthy weight may be especially difficult to achieve in those with severe obesity. In some cases, medications, devices and surgical management might be considered.  What can you do?  If you are overweight or obese and are interested in methods for weight loss, you should discuss this with your provider.     Consider reducing daily calorie intake by 500 calories.     Keep a food  journal.     Avoiding skipping meals, consider cutting portions instead.    Diet combined with exercise helps maintain muscle while optimizing fat loss. Strength training is particularly important for building and maintaining muscle mass. Exercise helps reduce stress, increase energy, and improves fitness. Increasing exercise without diet control, however, may not burn enough calories to loose weight.       Start walking three days a week 10-20 minutes at a time    Work towards walking thirty minutes five days a week     Eventually, increase the speed of your walking for 1-2 minutes at time    In addition, we recommend that you review healthy lifestyles and methods for weight loss available through the National Institutes of Health patient information sites:  http://win.niddk.nih.gov/publications/index.htm    And look into health and wellness programs that may be available through your health insurance provider, employer, local community center, or [employer]ristina [employer].    Weight management plan: Patient was referred to their PCP to discuss a diet and exercise plan.    1. CPAP-  WHAT DOES IT DO AND HOW CAN I LEARN TO WEAR IT?                               BEFORE I START, CAN I WATCH A MOVIE TO GET A PLAN ON HOW TO USE CPAP?  https://www.Rumgr.com/watch?d=l0A98fr363O      Continuous positive airway pressure, or CPAP, is the most effective treatment for obstructive sleep apnea. It works by blowing room air, through a mask, to hold your throat open. A decision to use CPAP is a major step forward in the pursuit of a healthier life. The successful use of CPAP will help you breathe easier, sleep better and live healthier. You can choose CPAP equipment from any durable medical equipment provider that meets your needs.  Using CPAP can be a positive experience if you keep these edward points in mind:  1. Commitment  CPAP is not a quick fix for your problem. It involves a long-term commitment to improve your sleep and your  "health.    2. Communication  Stay in close communication with both your sleep doctor and your CPAP supplier. Ask lots of questions and seek help when you need it.    3. Consistency  Use CPAP all night, every night and for every nap. You will receive the maximum health benefits from CPAP when you use it every time that you sleep. This will also make it easier for your body to adjust to the treatment.    4. Correction  The first machine and mask that you try may not be the best ones for you. Work with your sleep doctor and your CPAP supplier to make corrections to your equipment selection. Ask about trying a different type of machine or mask if you have ongoing problems. Make sure that your mask is a good fit and learn to use your equipment properly.    5. Challenge  Tell a family member or close friend to ask you each morning if you used your CPAP the previous night. Have someone to challenge you to give it your best effort.    6. Connection   Your adjustment to CPAP will be easier if you are able to connect with others who use the same treatment. Ask your sleep doctor if there is a support group in your area for people who have sleep apnea, or look for one on the Internet.  7. Comfort   Increase your level of comfort by using a saline spray, decongestant or heated humidifier if CPAP irritates your nose, mouth or throat. Use your unit's \"ramp\" setting to slowly get used to the air pressure level. There may be soft pads you can buy that will fit over your mask straps. Look on www.CPAP.com for accessories that can help make CPAP use more comfortable.  8. Cleaning   Clean your mask, tubing and headgear on a regular basis. Put this time in your schedule so that you don't forget to do it. Check and replace the filters for your CPAP unit and humidifier.    9. Completion   Although you are never finished with CPAP therapy, you should reward yourself by celebrating the completion of your first month of treatment. Expect this " first month to be your hardest period of adjustment. It will involve some trial and error as you find the machine, mask and pressure settings that are right for you.    10. Continuation  After your first month of treatment, continue to make a daily commitment to use your CPAP all night, every night and for every nap.    CPAP-Tips to starting with success:  Begin using your CPAP for short periods of time during the day while you watch TV or read.    Use CPAP every night and for every nap. Using it less often reduces the health benefits and makes it harder for your body to get used to it.    Make small adjustments to your mask, tubing, straps and headgear until you get the right fit. Tightening the mask may actually worsen the leak.  If it leaves significant marks on your face or irritates the bridge of your nose, it may not be the best mask for you.  Speak with the person who supplied the mask and consider trying other masks. Insurances will allow you to try different masks during the first month of starting CPAP.  Insurance also covers a new mask, hose and filter about every 6 months.    Use a saline nasal spray to ease mild nasal congestion. Neti-Pot or saline nasal rinses may also help. Nasal gel sprays can help reduce nasal dryness.  Biotene mouthwash can be helpful to protect your teeth if you experience frequent dry mouth.  Dry mouth may be a sign of air escaping out of your mouth or out of the mask in the case of a full face mask.  Speak with your provider if you expect that is the case.     Take a nasal decongestant to relieve more severe nasal or sinus congestion.  Do not use Afrin (oxymetazoline) nasal spray more than 3 days in a row.  Speak with your sleep doctor if your nasal congestion is chronic.    Use a heated humidifier that fits your CPAP model to enhance your breathing comfort. Adjust the heat setting up if you get a dry nose or throat, down if you get condensation in the hose or mask.  Position  the CPAP lower than you so that any condensation in the hose drains back into the machine rather than towards the mask.    Try a system that uses nasal pillows if traditional masks give you problems.    Clean your mask, tubing and headgear once a week. Make sure the equipment dries fully.    Regularly check and replace the filters for your CPAP unit and humidifier.    Work closely with your sleep provider and your CPAP supplier to make sure that you have the machine, mask and air pressure setting that works best for you. It is better to stop using it and call your provider to solve problems than to lay awake all night frustrated with the device.    Daytime naps are not advised, but use the PAP device if taking naps. Many insurances require that we prove you are using the PAP device at least 4 hours on at least 70% of nights over a 30 day period. We have 90 days to meet those criteria.    You can get new supplies (mask, hose and filter) for your device every 3-6 months (covered by insurance). You do not need to get supplies that often, but they are available if you would like them. You may exchange the mask once within the first month if you feel the initial mask does not fit well. Please, contact your medical equipment provider for equipment issues.    Please let me know if you have any snoring, daytime sleepiness, or poor sleep quality. We will want to make sure your PAP device is adequately treating your condition.    There is a website called CPAP.com that has accessories that may make CPAP use easier. Please visit it at your convenience.    Follow up with me within one year. Continue the good work!    Thanks!    Fadi Fernandes MD, MPH  Clinical Sleep and Occupational / Environmental Medicine            Follow-ups after your visit        Who to contact     If you have questions or need follow up information about today's clinic visit or your schedule please contact [facility] directly at  "[phone].  Normal or non-critical lab and imaging results will be communicated to you by MyChart, letter or phone within 4 business days after the clinic has received the results. If you do not hear from us within 7 days, please contact the clinic through Taniumhart or phone. If you have a critical or abnormal lab result, we will notify you by phone as soon as possible.  Submit refill requests through Sinobpo or call your pharmacy and they will forward the refill request to us. Please allow 3 business days for your refill to be completed.          Additional Information About Your Visit        Taniumhart Information     Sinobpo gives you secure access to your electronic health record. If you see a primary care provider, you can also send messages to your care team and make appointments. If you have questions, please call your primary care clinic.  If you do not have a primary care provider, please call [phone] and they will assist you.        Care EveryWhere ID     This is your Care EveryWhere ID. This could be used by other organizations to access your [facility] medical records  [id number]        Your Vitals Were     Pulse Respirations Height Pulse Oximetry BMI (Body Mass Index)       90 14 1.803 m (5' 11\") 93% 30.82 kg/m2        Blood Pressure from Last 3 Encounters:   04/04/18 124/82   02/14/18 125/84   01/31/18 116/83    Weight from Last 3 Encounters:   04/04/18 100.2 kg (221 lb)   02/14/18 101.6 kg (224 lb)   01/31/18 101.8 kg (224 lb 6.4 oz)              Today, you had the following     No orders found for display       Primary Care Provider Office Phone # Fax #    [facility] [phone] [address] [facility] [address]        Equal Access to Services     TANK MAHMOOD : Lan Neff, rico cotter, moiz lock. So [facility] [phone].    ATENCIÓN: Si habla español, tiene a christianson disposición " servicios gratuitos de asistencia lingüística. Misty castro [phone].    We comply with applicable federal civil rights laws and [place] laws. We do not discriminate on the basis of race, color, national origin, age, disability, sex, sexual orientation, or gender identity.            Thank you!     Thank you for choosing [facility]  for your care. Our goal is always to provide you with excellent care. Hearing back from our patients is one way we can continue to improve our services. Please take a few minutes to complete the written survey that you may receive in the mail after your visit with us. Thank you!             Your Updated Medication List - Protect others around you: Learn how to safely use, store and throw away your medicines at www.disposemymeds.org.          This list is accurate as of 4/4/18  9:33 AM.  Always use your most recent med list.                   Brand Name Dispense Instructions for use Diagnosis    FLUoxetine 20 MG capsule    PROzac    90 capsule    TAKE 1 CAPSULE (20 MG) BY MOUTH DAILY    MELIDA (generalized anxiety disorder)

## 2018-07-13 DIAGNOSIS — F41.1 GAD (GENERALIZED ANXIETY DISORDER): ICD-10-CM

## 2018-12-11 ENCOUNTER — ANCILLARY PROCEDURE (OUTPATIENT)
Dept: GENERAL RADIOLOGY | Facility: CLINIC | Age: 53
End: 2018-12-11
Attending: ORTHOPAEDIC SURGERY
Payer: OTHER MISCELLANEOUS

## 2018-12-11 ENCOUNTER — OFFICE VISIT (OUTPATIENT)
Dept: ORTHOPEDICS | Facility: CLINIC | Age: 53
End: 2018-12-11
Payer: OTHER MISCELLANEOUS

## 2018-12-11 VITALS
WEIGHT: 221 LBS | OXYGEN SATURATION: 97 % | BODY MASS INDEX: 30.82 KG/M2 | SYSTOLIC BLOOD PRESSURE: 130 MMHG | DIASTOLIC BLOOD PRESSURE: 86 MMHG | HEART RATE: 73 BPM | TEMPERATURE: 98.1 F

## 2018-12-11 DIAGNOSIS — M67.52 PLICA SYNDROME OF KNEE, LEFT: Primary | ICD-10-CM

## 2018-12-11 DIAGNOSIS — M25.561 LATERAL KNEE PAIN, RIGHT: ICD-10-CM

## 2018-12-11 PROCEDURE — 73562 X-RAY EXAM OF KNEE 3: CPT | Mod: LT

## 2018-12-11 PROCEDURE — 99214 OFFICE O/P EST MOD 30 MIN: CPT | Performed by: ORTHOPAEDIC SURGERY

## 2018-12-11 ASSESSMENT — PAIN SCALES - GENERAL: PAINLEVEL: MODERATE PAIN (4)

## 2018-12-11 NOTE — PROGRESS NOTES
"SUBJECTIVE:     Luisito Johnston is a 53 year old male who is seen as self referral for evaluation of  left knee pain that has been present approximately 8 days. No known injury, but it did start after he was going up and down several vertical ladders leading to the roof while at work.  He woke up the day after doing this with pain in the knee. Last week he went to [place] and was in a lot of pain with walking, he put a knee sleeve on and that improved the pain a great deal.. This is a worker's comp issue.    Present symptoms: pain intermedially, swelling, radiating pain in the leg, some catching, \"snapping\" sensations.  Symptoms occur walking and sitting for long periods. Denies locking or giving out.     Treatments tried to this point: knee sleeve. He notes that the pain increased after sitting for a long period without the brace on.     Orthopedic PMH: right knee arthroscopy 7/14/17    Review of Systems:  CONSTITUTIONAL:  NEGATIVE for fever, chills, change in weight  INTEGUMENTARY/SKIN:  NEGATIVE for worrisome rashes, moles or lesions  EYES:  NEGATIVE for vision changes or irritation  ENT/MOUTH:  NEGATIVE for ear, mouth and throat problems  RESP:  NEGATIVE for significant cough or SOB  BREAST:  NEGATIVE for masses, tenderness or discharge  CV:  NEGATIVE for chest pain, palpitations or peripheral edema  GI:  NEGATIVE for nausea, abdominal pain, heartburn, or change in bowel habits  :  Negative   MUSCULOSKELETAL:  See HPI above  NEURO:  NEGATIVE for weakness, dizziness or paresthesias  ENDOCRINE:  NEGATIVE for temperature intolerance, skin/hair changes  HEME/ALLERGY/IMMUNE:  NEGATIVE for bleeding problems  PSYCHIATRIC:  NEGATIVE for changes in mood or affect      OBJECTIVE:  Physical Exam:  /86 (BP Location: Left arm, Patient Position: Sitting, Cuff Size: Adult Large)   Pulse 73   Temp 98.1  F (36.7  C) (Oral)   Wt 100.2 kg (221 lb)   SpO2 97%   BMI 30.82 kg/m    General Appearance: healthy, alert and " no distress   Skin: no suspicious lesions or rashes  Neuro: Normal strength and tone, mentation intact and speech normal  Vascular: good pulses, and capillary refill   Lymph: no lymphadenopathy   Psych:  mentation appears normal and affect normal/bright  Resp: no increased work of breathing    Left Knee Exam:  Gait: walks with normal gait  Alignment: normal   Squat: 100 % painful.    Patellofemoral joint: mild crepitations in the patellofemoral joint.  Effusion: mild  ROM: 0 extension to 140* flexion   Hip: Diminished ROM  Tender: medial joint line and medial facet of the patella, medial plica  Masses: none  Ligaments:   Lachman's: stable   Anterior/Posterior drawer: stable,   Varus/Valgus stress: stable to varus and valgus stress  McMurrays: positive medially with valgus    X-rays:  Obtained 12/11/18 of the LEFT knee: 3-views, reviewed in the office with the patient by myself today and are essentially normal, no evidence of OA.        ASSESSMENT:   1. Inflamed Medial Plica of Left knee.  Meniscal tear less likely    PLAN:   Recommended continued bracing, icing, OTC anti-inflammatory medication. Can consider a cortisone shot, but may want to allow more time to improve without. Offered physical therapy with iontophoresis, patient defers at this time. Work restrictions given, avoid climbing, kneeling, squatting for 4 weeks, provided work note.     Return to clinic: as needed.     ROMAN Lemon MD  Dept. Orthopedic Surgery  [facility]     This document serves as a record of the services and decisions personally performed and made by Dr. ROMAN Lemon MD. It was created on his behalf by Deepa Jacobs, a trained medical scribe. The creation of this record is based on the provider's personal observations and the statements of the patient. This document has been checked and approved by the attending provider.   Deepa Jacobs December 11, 2018 10:08 AM

## 2018-12-11 NOTE — LETTER
"    12/11/2018         RE: Luisito Johnston  [address]  [place] [address]        Dear Colleague,    Thank you for referring your patient, Luisito Johnston, to the [facility]. Please see a copy of my visit note below.    HISTORY OF PRESENT ILLNESS    Luisito Johnston is a 53 year old male who is seen {FSOC CONSULT:808172} for evaluation of  left knee pain that has been present approximately {NUMBER:280824} {DAYS:100229}.  No known injury.    Present symptoms: pain {SYMPTOMS:563126}.  Symptoms occur {PAINWHEN:350634}.      Treatments tried to this point: {ORTHO TX:337037}    Orthopedic PMH: right knee arthroscopy 7/14/17    Other PMH:   Past Medical History:   Diagnosis Date     Arthritis     Mother and Father     Chronic sinusitis     Polyposis     Hyperlipidemia      Increased BMI      Psoriasis        Surgical:   Past Surgical History:   Procedure Laterality Date     ARTHROSCOPY KNEE Right 7/14/2017    Procedure: ARTHROSCOPY KNEE;  Arthroscopic Menisectomy Right Knee;  Surgeon: Jim Lemon MD;  Location: [facility] OR     COLONOSCOPY N/A 3/9/2015    Procedure: COLONOSCOPY;  Surgeon: Denys Archuleta MD;  Location: [facility] OR     COLONOSCOPY WITH CO2 INSUFFLATION N/A 3/9/2015    Procedure: COLONOSCOPY WITH CO2 INSUFFLATION;  Surgeon: Denys Archuleta MD;  Location: [facility] OR     ENT SURGERY      polyps from nasal passage.      HC SHLDR ARTHROSCOP,PART ACROMIOPLAS  2000     SHOULDER SURGERY       SURGICAL HISTORY OF -   1991    Chronic Lt Shoulder Dislocation       Family Hx:    Family History   Problem Relation Age of Onset     Diabetes Father      Respiratory Mother        Social Hx:  reports that  has never smoked. he has never used smokeless tobacco. He reports that he drinks alcohol. He reports that he does not use drugs.    REVIEW OF SYSTEMS:    CONSTITUTIONAL:  {[place] CONSTITUTIONAL:396464::\"NEGATIVE for fever, chills, change in weight\"}  INTEGUMENTARY/SKIN:  {[place] " "SKIN:618913::\"NEGATIVE for worrisome rashes, moles or lesions\"}  EYES:  {[facility] EYES:728471::\"NEGATIVE for vision changes or irritation\"}  ENT/MOUTH:  {[facility] ENT:761970::\"NEGATIVE for ear, mouth and throat problems\"}  RESP:  {[facility] RESP ROS:023816::\"NEGATIVE for significant cough or SOB\"}  BREAST:  {[facility] BREASTS:353023::\"NEGATIVE for masses, tenderness or discharge\"}  CV:  {[facility] CV:301988::\"NEGATIVE for chest pain, palpitations or peripheral edema\"}  GI:  {[facility] GI ROS:983897::\"NEGATIVE for nausea, abdominal pain, heartburn, or change in bowel habits\"}  :  Negative   MUSCULOSKELETAL:  See HPI above  NEURO:  {[facility] NEURO ROS:307615::\"NEGATIVE for weakness, dizziness or paresthesias\"}  ENDOCRINE:  {[facility] ENDO:785890::\"NEGATIVE for temperature intolerance, skin/hair changes\"}  HEME/ALLERGY/IMMUNE:  {[facility] HEME ROS:268280::\"NEGATIVE for bleeding problems\"}  PSYCHIATRIC:  {[facility] PSYCH ROS:407549::\"NEGATIVE for changes in mood or affect\"}    PHYSICAL EXAM:  There were no vitals taken for this visit.   GENERAL APPEARANCE: {[facility] GENERAL APPEARANCE:50::\"healthy\",\"alert\",\"no distress\"}   SKIN: {[facility] EXAM CPE - SKIN:110883::\"no suspicious lesions or rashes\"}  NEURO: {[facility] EXAM CPE - NEURO:007009::\"Normal strength and tone\",\"mentation intact\",\"speech normal\"}  VASCULAR: *** good pulses, and cappillary refill   LYMPH: no lymphadenopathy   PSYCH:  {[place] EXAM CPE - PSYCH:940993::\"mentation appears normal\",\"affect normal/bright\"}  RESP: no increased work of breathing    KNEE EXAM:   Gait: {ORTHOGAIT:135238}  Alignment: {Ortho Alignment:116905}  Squat: *** % {squat:162542}.   Patellofemoral joint: {MILD MOD:085402} crepitations in the patellofemoral joint.   ROM: {ORTHO ROM:410934}  Effusion: {MILD/MODERATE:927277}  Tender: {tenderness:711452}  Ligaments:  ***Lachman's Stable, Anterior and posterior drawer stable, stable to varus and valgus stress.  {MILD MOD:975319} pain with Varus/Valgus stress testing.    McMurrays: {NE}  Lateral retinaculum " "{tightness:985832::\"is not tight\"}  Apprehension: negative    X-RAY:  From ***today 12/10/2018: {FINDINGS:528954}    MRI: shows {ORTHO MRI FINDINGS:763116}     Impression: {ORTHO KNEE IMPRESSION:110821}    Plan: {ORTHO KNEE PLAN:052441}    Return to clinic {RTC WHEN:082548}    ROMAN Lemon MD  Dept. Orthopedic Surgery  [facility]       Again, thank you for allowing me to participate in the care of your patient.        Sincerely,        Jim Lemon MD    "

## 2019-01-23 ENCOUNTER — OFFICE VISIT (OUTPATIENT)
Dept: FAMILY MEDICINE | Facility: CLINIC | Age: 54
End: 2019-01-23
Payer: COMMERCIAL

## 2019-01-23 VITALS
HEART RATE: 110 BPM | WEIGHT: 223 LBS | SYSTOLIC BLOOD PRESSURE: 126 MMHG | OXYGEN SATURATION: 97 % | DIASTOLIC BLOOD PRESSURE: 82 MMHG | HEIGHT: 71 IN | TEMPERATURE: 98.6 F | BODY MASS INDEX: 31.22 KG/M2

## 2019-01-23 DIAGNOSIS — F41.1 GAD (GENERALIZED ANXIETY DISORDER): ICD-10-CM

## 2019-01-23 DIAGNOSIS — Z23 NEED FOR PROPHYLACTIC VACCINATION AND INOCULATION AGAINST INFLUENZA: Primary | ICD-10-CM

## 2019-01-23 PROCEDURE — 90686 IIV4 VACC NO PRSV 0.5 ML IM: CPT | Performed by: FAMILY MEDICINE

## 2019-01-23 PROCEDURE — 99213 OFFICE O/P EST LOW 20 MIN: CPT | Mod: 25 | Performed by: FAMILY MEDICINE

## 2019-01-23 PROCEDURE — 90471 IMMUNIZATION ADMIN: CPT | Performed by: FAMILY MEDICINE

## 2019-01-23 ASSESSMENT — ANXIETY QUESTIONNAIRES
3. WORRYING TOO MUCH ABOUT DIFFERENT THINGS: SEVERAL DAYS
2. NOT BEING ABLE TO STOP OR CONTROL WORRYING: NOT AT ALL
5. BEING SO RESTLESS THAT IT IS HARD TO SIT STILL: SEVERAL DAYS
7. FEELING AFRAID AS IF SOMETHING AWFUL MIGHT HAPPEN: SEVERAL DAYS
GAD7 TOTAL SCORE: 7
IF YOU CHECKED OFF ANY PROBLEMS ON THIS QUESTIONNAIRE, HOW DIFFICULT HAVE THESE PROBLEMS MADE IT FOR YOU TO DO YOUR WORK, TAKE CARE OF THINGS AT HOME, OR GET ALONG WITH OTHER PEOPLE: SOMEWHAT DIFFICULT
1. FEELING NERVOUS, ANXIOUS, OR ON EDGE: SEVERAL DAYS
6. BECOMING EASILY ANNOYED OR IRRITABLE: MORE THAN HALF THE DAYS

## 2019-01-23 ASSESSMENT — PATIENT HEALTH QUESTIONNAIRE - PHQ9: 5. POOR APPETITE OR OVEREATING: SEVERAL DAYS

## 2019-01-23 ASSESSMENT — MIFFLIN-ST. JEOR: SCORE: 1868.65

## 2019-01-23 NOTE — PROGRESS NOTES

## 2019-01-23 NOTE — PROGRESS NOTES
"SUBJECTIVE:  SUBJECTIVE:  53 year old.The patient has a history of  follow-up of depressive symptoms.    Since last visit the patient has been doing well.  Notes from that visit reviewed. PHQ-9 has been reviewed.  Current symptoms include: Depressed Mood   Symptoms that have subjectively improved include: Depressed Mood  Previous and current treatment modalities employed include: Medications   Prozac (Fluoxetine)  Patient Active Problem List   Diagnosis     CARDIOVASCULAR SCREENING; LDL GOAL LESS THAN 160     Generalized anxiety disorder     Generalized anxiety disorder     ALLAN (obstructive sleep apnea)     Nocturnal hypoxemia      Reviewed health maintenance  OBJECTIVE:  no apparent distress  /82   Pulse 110   Temp 98.6  F (37  C) (Oral)   Ht 1.803 m (5' 11\")   Wt 101.2 kg (223 lb)   SpO2 97%   BMI 31.10 kg/m         MENTAL STATUS EXAM:   1. Clinical observations:Patient was clean and was adequately groomed. Patient's emotional presentation was cooperative and sima/open. Patient spoke clearly and articulately. Patient maintained adequate eye contact and was cooperative in answering questions.  2. Patient appeared to be well-oriented in all spheres with coherent, logical, goal directed and relevent thinking.  3. Thought content: Denies auditory and visual hallucinations or delusions.  4. Affect and mood: Affect is alert and her emotional attitude is described as normal.  5. Sensorium and cognition: Patient was in contact with reality and oriented to place, time, person and situation. patient demonstrated no impairment in immediate, recent, or remote memory. patient's insight was adequate without obvious deficits in intelligence.    ICD-10-CM    1. Need for prophylactic vaccination and inoculation against influenza Z23 FLU VACCINE, SPLIT VIRUS, IM (QUADRIVALENT) [49030]- >3 YRS     Vaccine Administration, Initial [59198]   2. MELIDA (generalized anxiety disorder) F41.1 FLUoxetine (PROZAC) 20 MG capsule    " PLAN:Follow up in 6 months   for phq-9        :

## 2019-01-24 ASSESSMENT — ANXIETY QUESTIONNAIRES: GAD7 TOTAL SCORE: 7

## 2019-02-12 NOTE — PROGRESS NOTES
"SUBJECTIVE:   Luisito Johnston is here in follow up of left knee pain, possible inflamed medial plica left knee.    Present symptoms: on/off pain anterior-medial.  Pain if walks a lot, pain at night.   Treatments up to this point: bracing, icing, OTC anti-inflammatory medications    Review of Systems:  Constitutional/General: Negative for fever, chills, change in weight  Integumentary/Skin: Negative for worrisome rashes, moles, or lesions  Neuro: Negative for weakness, dizziness, or paresthesias   Psychiatric: negative for changes in mood or affect    OBJECTIVE:  Physical Exam:  /85 (BP Location: Right arm, Patient Position: Sitting, Cuff Size: Adult Large)   Pulse 102   Ht 1.803 m (5' 11\")   Wt 101.2 kg (223 lb)   SpO2 97%   BMI 31.10 kg/m    General Appearance: healthy, alert and no distress   Skin: no suspicious lesions or rashes  Neuro: Normal strength and tone, mentation intact and speech normal  Vascular: good pulses, and capillary refill   Lymph: no lymphadenopathy   Psych:  mentation appears normal and affect normal/bright  Resp: no increased work of breathing    Left Knee Exam:  Gait: walks with antalgic gait favoring left side   Alignment: normal   Patellofemoral joint: mild crepitations in the patellofemoral joint.  Effusion: none  ROM: 0-140*  Tender: medial plica  Masses: none  Ligaments:   Lachman's: stable   Anterior/Posterior drawer: stable,   Varus/Valgus stress: stable to varus and valgus stress  McMurrays: negative      ASSESSMENT:   Encounter Diagnosis   Name Primary?     Plica syndrome of knee, left Yes        PLAN: We talked about the options and he would like to try a steroid injection into the medial plica.  We talked about physical therapy, which she was not too excited about.  He was more accepting of therapy once we mentioned that modalities would be involved, and not just exercises.  He would consider therapy in the future.    With the patient's consent, left knee(s) injected, " directed into the medial plica, with 80mg of Depomedrol and 4cc of local anesthetic after sterile prep.      Return to clinic: as needed.  Could call if he would like physical therapy with iontophoresis.  Plica excision is last option.    ROMAN Lemon MD  Dept. Orthopedic Surgery  [facility]

## 2019-02-13 ENCOUNTER — OFFICE VISIT (OUTPATIENT)
Dept: ORTHOPEDICS | Facility: CLINIC | Age: 54
End: 2019-02-13
Payer: OTHER MISCELLANEOUS

## 2019-02-13 VITALS
SYSTOLIC BLOOD PRESSURE: 124 MMHG | WEIGHT: 223 LBS | DIASTOLIC BLOOD PRESSURE: 85 MMHG | BODY MASS INDEX: 31.22 KG/M2 | HEIGHT: 71 IN | HEART RATE: 102 BPM | OXYGEN SATURATION: 97 %

## 2019-02-13 DIAGNOSIS — M67.52 PLICA SYNDROME OF KNEE, LEFT: Primary | ICD-10-CM

## 2019-02-13 PROCEDURE — 99213 OFFICE O/P EST LOW 20 MIN: CPT | Mod: 25 | Performed by: ORTHOPAEDIC SURGERY

## 2019-02-13 PROCEDURE — 20610 DRAIN/INJ JOINT/BURSA W/O US: CPT | Mod: LT | Performed by: ORTHOPAEDIC SURGERY

## 2019-02-13 RX ORDER — METHYLPREDNISOLONE ACETATE 80 MG/ML
80 INJECTION, SUSPENSION INTRA-ARTICULAR; INTRALESIONAL; INTRAMUSCULAR; SOFT TISSUE
Status: DISCONTINUED | OUTPATIENT
Start: 2019-02-13 | End: 2021-06-02

## 2019-02-13 RX ORDER — LIDOCAINE HYDROCHLORIDE 10 MG/ML
2 INJECTION, SOLUTION INFILTRATION; PERINEURAL
Status: DISCONTINUED | OUTPATIENT
Start: 2019-02-13 | End: 2021-06-02

## 2019-02-13 RX ADMIN — LIDOCAINE HYDROCHLORIDE 2 ML: 10 INJECTION, SOLUTION INFILTRATION; PERINEURAL at 10:07

## 2019-02-13 RX ADMIN — METHYLPREDNISOLONE ACETATE 80 MG: 80 INJECTION, SUSPENSION INTRA-ARTICULAR; INTRALESIONAL; INTRAMUSCULAR; SOFT TISSUE at 10:07

## 2019-02-13 ASSESSMENT — MIFFLIN-ST. JEOR: SCORE: 1868.65

## 2019-02-13 NOTE — PROGRESS NOTES
Large Joint Injection/Arthocentesis: L knee joint  Date/Time: 2/13/2019 10:07 AM  Performed by: Jim Lemon MD  Authorized by: Jim Lemon MD     Indications:  Pain  Needle Size:  22 G  Guidance: landmark guided    Approach:  Anterolateral  Location:  Knee  Site:  L knee joint  Medications:  80 mg methylPREDNISolone 80 MG/ML; 2 mL lidocaine 1 %  Outcome:  Tolerated well, no immediate complications  Procedure discussed: discussed risks, benefits, and alternatives    Consent Given by:  Patient  Timeout: timeout called immediately prior to procedure    Prep: patient was prepped and draped in usual sterile fashion

## 2019-02-13 NOTE — LETTER
"    2/13/2019         RE: Luisito Johnston  [address]  [facility] [address]        Dear Colleague,    Thank you for referring your patient, Luisito Johnston, to the [facility]. Please see a copy of my visit note below.    SUBJECTIVE:   Luisito Johnston is here in follow up of left knee pain, possible inflamed medial plica left knee.    Present symptoms: on/off pain anterior-medial.  Pain if walks a lot, pain at night.   Treatments up to this point: bracing, icing, OTC anti-inflammatory medications    Review of Systems:  Constitutional/General: Negative for fever, chills, change in weight  Integumentary/Skin: Negative for worrisome rashes, moles, or lesions  Neuro: Negative for weakness, dizziness, or paresthesias   Psychiatric: negative for changes in mood or affect    OBJECTIVE:  Physical Exam:  /85 (BP Location: Right arm, Patient Position: Sitting, Cuff Size: Adult Large)   Pulse 102   Ht 1.803 m (5' 11\")   Wt 101.2 kg (223 lb)   SpO2 97%   BMI 31.10 kg/m     General Appearance: healthy, alert and no distress   Skin: no suspicious lesions or rashes  Neuro: Normal strength and tone, mentation intact and speech normal  Vascular: good pulses, and capillary refill   Lymph: no lymphadenopathy   Psych:  mentation appears normal and affect normal/bright  Resp: no increased work of breathing    Left Knee Exam:  Gait: walks with antalgic gait favoring left side   Alignment: normal   Patellofemoral joint: mild crepitations in the patellofemoral joint.  Effusion: none  ROM: 0-140*  Tender: medial plica  Masses: none  Ligaments:   Lachman's: stable   Anterior/Posterior drawer: stable,   Varus/Valgus stress: stable to varus and valgus stress  McMurrays: negative      ASSESSMENT:   Encounter Diagnosis   Name Primary?     Plica syndrome of knee, left Yes        PLAN: We talked about the options and he would like to try a steroid injection into the medial plica.  We talked about physical therapy, " which she was not too excited about.  He was more accepting of therapy once we mentioned that modalities would be involved, and not just exercises.  He would consider therapy in the future.    With the patient's consent, left knee(s) injected, directed into the medial plica, with 80mg of Depomedrol and 4cc of local anesthetic after sterile prep.      Return to clinic: as needed.  Could call if he would like physical therapy with iontophoresis.  Plica excision is last option.    ROMAN Lemon MD  Dept. Orthopedic Surgery  [facility]         Large Joint Injection/Arthocentesis: L knee joint  Date/Time: 2/13/2019 10:07 AM  Performed by: Jim Lemon MD  Authorized by: Jim Lemon MD     Indications:  Pain  Needle Size:  22 G  Guidance: landmark guided    Approach:  Anterolateral  Location:  Knee  Site:  L knee joint  Medications:  80 mg methylPREDNISolone 80 MG/ML; 2 mL lidocaine 1 %  Outcome:  Tolerated well, no immediate complications  Procedure discussed: discussed risks, benefits, and alternatives    Consent Given by:  Patient  Timeout: timeout called immediately prior to procedure    Prep: patient was prepped and draped in usual sterile fashion            Again, thank you for allowing me to participate in the care of your patient.        Sincerely,        Jim Lemon MD

## 2019-09-20 DIAGNOSIS — F41.1 GAD (GENERALIZED ANXIETY DISORDER): ICD-10-CM

## 2019-09-23 NOTE — TELEPHONE ENCOUNTER
"Fluoxetine refill request  Last OV Dr. Red Trejo: 1/23/19  [id number] today: 11; no suicidal thoughts.    PHQ-9 SCORE 7/10/2017 1/8/2018 9/23/2019   PHQ-9 Total Score - - -   PHQ-9 Total Score MyChart 3 (Minimal depression) - 11 (Moderate depression)   PHQ-9 Total Score 3 5 11     Requested Prescriptions   Pending Prescriptions Disp Refills     FLUoxetine (PROZAC) 20 MG capsule [Pharmacy Med Name: FLUOXETINE 20MG CAPSULES] 90 capsule 0     Sig: TAKE 1 CAPSULE(20 MG) BY MOUTH DAILY       SSRIs Protocol Passed - 9/23/2019  1:09 PM        Passed - Recent (12 mo) or future (30 days) visit within the authorizing provider's specialty     Patient had office visit in the last 12 months or has a visit in the next 30 days with authorizing provider or within the authorizing provider's specialty.  See \"Patient Info\" tab in inbasket, or \"Choose Columns\" in Meds & Orders section of the refill encounter.              Passed - Medication is active on med list        Passed - Patient is age 18 or older        "

## 2019-09-28 ENCOUNTER — HEALTH MAINTENANCE LETTER (OUTPATIENT)
Age: 54
End: 2019-09-28

## 2020-02-21 ENCOUNTER — TELEPHONE (OUTPATIENT)
Dept: FAMILY MEDICINE | Facility: CLINIC | Age: 55
End: 2020-02-21

## 2020-02-21 ENCOUNTER — MYC REFILL (OUTPATIENT)
Dept: FAMILY MEDICINE | Facility: CLINIC | Age: 55
End: 2020-02-21

## 2020-02-21 DIAGNOSIS — F41.1 GAD (GENERALIZED ANXIETY DISORDER): ICD-10-CM

## 2020-02-21 NOTE — TELEPHONE ENCOUNTER
Patient states he is going to need one more 30 day supply of fluoxetine in order to get him seen for your next available appointment on 4-1-20.    Thank you.

## 2020-02-24 ENCOUNTER — DOCUMENTATION ONLY (OUTPATIENT)
Dept: FAMILY MEDICINE | Facility: CLINIC | Age: 55
End: 2020-02-24

## 2020-02-24 DIAGNOSIS — Z13.6 CARDIOVASCULAR SCREENING; LDL GOAL LESS THAN 160: Primary | ICD-10-CM
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
## 2020-02-24 NOTE — PROGRESS NOTES
.Please place or confirm pending lab orders for upcoming lab appointment on 2/26/20  Thank you,  [place]

## 2020-02-26 DIAGNOSIS — Z13.6 CARDIOVASCULAR SCREENING; LDL GOAL LESS THAN 160: ICD-10-CM

## 2020-02-26 PROCEDURE — 36415 COLL VENOUS BLD VENIPUNCTURE: CPT | Performed by: FAMILY MEDICINE

## 2020-02-26 PROCEDURE — 80061 LIPID PANEL: CPT | Performed by: FAMILY MEDICINE

## 2020-02-27 LAB
CHOLEST SERPL-MCNC: 187 MG/DL
HDLC SERPL-MCNC: 26 MG/DL
LDLC SERPL CALC-MCNC: 95 MG/DL
NONHDLC SERPL-MCNC: 161 MG/DL
TRIGL SERPL-MCNC: 330 MG/DL

## 2020-03-26 ENCOUNTER — E-VISIT (OUTPATIENT)
Dept: FAMILY MEDICINE | Facility: CLINIC | Age: 55
End: 2020-03-26
Payer: COMMERCIAL

## 2020-03-26 DIAGNOSIS — F41.1 GAD (GENERALIZED ANXIETY DISORDER): ICD-10-CM

## 2020-03-26 PROCEDURE — 99421 OL DIG E/M SVC 5-10 MIN: CPT | Performed by: FAMILY MEDICINE

## 2020-03-26 ASSESSMENT — PATIENT HEALTH QUESTIONNAIRE - PHQ9
SUM OF ALL RESPONSES TO PHQ QUESTIONS 1-9: 5
SUM OF ALL RESPONSES TO PHQ QUESTIONS 1-9: 5
10. IF YOU CHECKED OFF ANY PROBLEMS, HOW DIFFICULT HAVE THESE PROBLEMS MADE IT FOR YOU TO DO YOUR WORK, TAKE CARE OF THINGS AT HOME, OR GET ALONG WITH OTHER PEOPLE: NOT DIFFICULT AT ALL

## 2020-03-26 ASSESSMENT — ANXIETY QUESTIONNAIRES
2. NOT BEING ABLE TO STOP OR CONTROL WORRYING: SEVERAL DAYS
7. FEELING AFRAID AS IF SOMETHING AWFUL MIGHT HAPPEN: NOT AT ALL
6. BECOMING EASILY ANNOYED OR IRRITABLE: SEVERAL DAYS
GAD7 TOTAL SCORE: 6
5. BEING SO RESTLESS THAT IT IS HARD TO SIT STILL: SEVERAL DAYS
GAD7 TOTAL SCORE: 6
4. TROUBLE RELAXING: SEVERAL DAYS
7. FEELING AFRAID AS IF SOMETHING AWFUL MIGHT HAPPEN: NOT AT ALL
1. FEELING NERVOUS, ANXIOUS, OR ON EDGE: SEVERAL DAYS
GAD7 TOTAL SCORE: 6
3. WORRYING TOO MUCH ABOUT DIFFERENT THINGS: SEVERAL DAYS

## 2020-03-27 ASSESSMENT — ANXIETY QUESTIONNAIRES: GAD7 TOTAL SCORE: 6

## 2020-03-27 ASSESSMENT — PATIENT HEALTH QUESTIONNAIRE - PHQ9: SUM OF ALL RESPONSES TO PHQ QUESTIONS 1-9: 5

## 2020-07-12 ENCOUNTER — HOSPITAL ENCOUNTER (EMERGENCY)
Facility: CLINIC | Age: 55
Discharge: HOME | End: 2020-07-12
Attending: EMERGENCY MEDICINE | Admitting: EMERGENCY MEDICINE
Payer: COMMERCIAL

## 2020-07-12 ENCOUNTER — NURSE TRIAGE (OUTPATIENT)
Dept: NURSING | Facility: CLINIC | Age: 55
End: 2020-07-12

## 2020-07-12 VITALS
HEART RATE: 78 BPM | BODY MASS INDEX: 29.99 KG/M2 | WEIGHT: 215 LBS | OXYGEN SATURATION: 97 % | DIASTOLIC BLOOD PRESSURE: 80 MMHG | TEMPERATURE: 98 F | SYSTOLIC BLOOD PRESSURE: 129 MMHG | RESPIRATION RATE: 18 BRPM

## 2020-07-12 DIAGNOSIS — S06.0X0A CONCUSSION WITHOUT LOSS OF CONSCIOUSNESS, INITIAL ENCOUNTER: ICD-10-CM

## 2020-07-12 DIAGNOSIS — S16.1XXA STRAIN OF NECK MUSCLE, INITIAL ENCOUNTER: ICD-10-CM

## 2020-07-12 DIAGNOSIS — S09.90XA CLOSED HEAD INJURY, INITIAL ENCOUNTER: ICD-10-CM

## 2020-07-12 DIAGNOSIS — S00.03XA CONTUSION OF FACE, SCALP AND NECK, INITIAL ENCOUNTER: ICD-10-CM

## 2020-07-12 DIAGNOSIS — S10.93XA CONTUSION OF FACE, SCALP AND NECK, INITIAL ENCOUNTER: ICD-10-CM

## 2020-07-12 DIAGNOSIS — S00.83XA CONTUSION OF FACE, SCALP AND NECK, INITIAL ENCOUNTER: ICD-10-CM
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
## 2020-07-12 PROCEDURE — 99283 EMERGENCY DEPT VISIT LOW MDM: CPT | Mod: Z6 | Performed by: EMERGENCY MEDICINE

## 2020-07-12 PROCEDURE — 99283 EMERGENCY DEPT VISIT LOW MDM: CPT | Performed by: EMERGENCY MEDICINE

## 2020-07-12 RX ORDER — ONDANSETRON 4 MG/1
4 TABLET, ORALLY DISINTEGRATING ORAL EVERY 6 HOURS PRN
Qty: 10 TABLET | Refills: 0 | Status: SHIPPED | OUTPATIENT
Start: 2020-07-12 | End: 2020-07-15
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
## 2020-07-12 NOTE — ED AVS SNAPSHOT
[facility] Emergency Department  [address] [facility] [address] [facility] [address]  Phone:  [phone]  Fax:  [phone]                                    Luisito Johnston   MRN: [id number]    Department:  [facility] Emergency Department   Date of Visit:  7/12/2020           After Visit Summary Signature Page    I have received my discharge instructions, and my questions have been answered. I have discussed any challenges I see with this plan with the nurse or doctor.    ..........................................................................................................................................  Patient/Patient Representative Signature      ..........................................................................................................................................  Patient Representative Print Name and Relationship to Patient    ..................................................               ................................................  Date                                   Time    ..........................................................................................................................................  Reviewed by Signature/Title    ...................................................              ..............................................  Date                                               Time          22EPIC Rev 08/18
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
## 2020-07-12 NOTE — DISCHARGE INSTRUCTIONS
Follow-up with concussion clinic.  Tylenol/ibuprofen for discomfort and headache    Ondansetron as prescribed for nausea    Activity as tolerated, screens as tolerated    Return here for progressive headache, vomiting, focal neurologic change or any other concern

## 2020-08-06 ENCOUNTER — MEDICAL CORRESPONDENCE (OUTPATIENT)
Dept: HEALTH INFORMATION MANAGEMENT | Facility: CLINIC | Age: 55
End: 2020-08-06

## 2020-08-06 ENCOUNTER — HOSPITAL ENCOUNTER (OUTPATIENT)
Dept: NEUROLOGY | Facility: CLINIC | Age: 55
Setting detail: THERAPIES SERIES
Discharge: STILL ADMITTED OR ADMINISTRATIVE | End: 2020-08-06
Attending: NURSE PRACTITIONER

## 2020-08-06 ENCOUNTER — TRANSFERRED RECORDS (OUTPATIENT)
Dept: HEALTH INFORMATION MANAGEMENT | Facility: CLINIC | Age: 55
End: 2020-08-06

## 2020-08-06 DIAGNOSIS — R53.83 FATIGUE, UNSPECIFIED TYPE: ICD-10-CM

## 2020-08-06 DIAGNOSIS — F07.81 POST CONCUSSION SYNDROME: ICD-10-CM

## 2020-08-06 DIAGNOSIS — G47.00 INSOMNIA, UNSPECIFIED TYPE: ICD-10-CM

## 2020-08-06 DIAGNOSIS — G44.309 POST-CONCUSSION HEADACHE: ICD-10-CM

## 2020-08-06 DIAGNOSIS — R42 DIZZINESS: ICD-10-CM

## 2020-08-06 DIAGNOSIS — H53.9 VISION ABNORMALITIES: ICD-10-CM

## 2020-08-06 DIAGNOSIS — R11.0 NAUSEA: ICD-10-CM
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
## 2020-08-06 DIAGNOSIS — F06.4 ANXIETY DISORDER DUE TO MEDICAL CONDITION: ICD-10-CM

## 2020-08-06 DIAGNOSIS — R41.3 MEMORY DIFFICULTIES: ICD-10-CM

## 2020-08-06 DIAGNOSIS — R41.840 ATTENTION AND CONCENTRATION DEFICIT: ICD-10-CM

## 2020-08-06 DIAGNOSIS — R45.4 IRRITABILITY: ICD-10-CM

## 2020-08-17 ENCOUNTER — HOSPITAL ENCOUNTER (OUTPATIENT)
Dept: NEUROLOGY | Facility: CLINIC | Age: 55
Setting detail: THERAPIES SERIES
Discharge: STILL ADMITTED OR ADMINISTRATIVE | End: 2020-08-17
Attending: CLINICAL NEUROPSYCHOLOGIST

## 2020-08-17 ENCOUNTER — TRANSFERRED RECORDS (OUTPATIENT)
Dept: HEALTH INFORMATION MANAGEMENT | Facility: CLINIC | Age: 55
End: 2020-08-17
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
## 2020-08-17 DIAGNOSIS — F43.23 ADJUSTMENT DISORDER WITH MIXED ANXIETY AND DEPRESSED MOOD: ICD-10-CM

## 2020-08-17 DIAGNOSIS — G44.309 POST-CONCUSSION HEADACHE: ICD-10-CM

## 2020-08-17 DIAGNOSIS — F07.81 POST CONCUSSION SYNDROME: ICD-10-CM

## 2020-08-20 ENCOUNTER — COMMUNICATION - HEALTHEAST (OUTPATIENT)
Dept: NEUROLOGY | Facility: CLINIC | Age: 55
End: 2020-08-20

## 2020-08-20 ENCOUNTER — HOSPITAL ENCOUNTER (OUTPATIENT)
Dept: NEUROLOGY | Facility: CLINIC | Age: 55
Setting detail: THERAPIES SERIES
Discharge: STILL ADMITTED OR ADMINISTRATIVE | End: 2020-08-20
Attending: NURSE PRACTITIONER

## 2020-08-20 ENCOUNTER — TRANSFERRED RECORDS (OUTPATIENT)
Dept: HEALTH INFORMATION MANAGEMENT | Facility: CLINIC | Age: 55
End: 2020-08-20

## 2020-08-20 DIAGNOSIS — R68.89 SENSITIVITY TO LIGHT: ICD-10-CM

## 2020-08-20 DIAGNOSIS — F06.4 ANXIETY DISORDER DUE TO MEDICAL CONDITION: ICD-10-CM

## 2020-08-20 DIAGNOSIS — R41.840 ATTENTION AND CONCENTRATION DEFICIT: ICD-10-CM

## 2020-08-20 DIAGNOSIS — G47.00 INSOMNIA, UNSPECIFIED TYPE: ICD-10-CM

## 2020-08-20 DIAGNOSIS — G44.309 POST-CONCUSSION HEADACHE: ICD-10-CM

## 2020-08-20 DIAGNOSIS — F07.81 POSTCONCUSSION SYNDROME: ICD-10-CM

## 2020-08-20 DIAGNOSIS — R53.83 FATIGUE, UNSPECIFIED TYPE: ICD-10-CM

## 2020-08-20 DIAGNOSIS — Z02.6 ENCOUNTER RELATED TO WORKER'S COMPENSATION CLAIM: ICD-10-CM

## 2020-08-20 DIAGNOSIS — H83.3X3 SOUND SENSITIVITY IN BOTH EARS: ICD-10-CM

## 2020-08-20 DIAGNOSIS — R45.4 IRRITABILITY: ICD-10-CM

## 2020-08-20 DIAGNOSIS — R11.0 NAUSEA: ICD-10-CM

## 2020-08-20 DIAGNOSIS — R41.3 MEMORY DIFFICULTIES: ICD-10-CM

## 2020-08-20 DIAGNOSIS — Z76.89 RETURN TO WORK EVALUATION: ICD-10-CM

## 2020-08-20 DIAGNOSIS — R42 DIZZINESS: ICD-10-CM

## 2020-08-24 ENCOUNTER — TELEPHONE (OUTPATIENT)
Dept: PSYCHIATRY | Facility: CLINIC | Age: 55
End: 2020-08-24
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
## 2020-09-01 ENCOUNTER — HOSPITAL ENCOUNTER (OUTPATIENT)
Dept: PHYSICAL THERAPY | Facility: CLINIC | Age: 55
Setting detail: THERAPIES SERIES
End: 2020-09-01
Attending: NURSE PRACTITIONER
Payer: COMMERCIAL

## 2020-09-01 PROCEDURE — 97112 NEUROMUSCULAR REEDUCATION: CPT | Mod: GP | Performed by: PHYSICAL THERAPIST

## 2020-09-01 PROCEDURE — 97162 PT EVAL MOD COMPLEX 30 MIN: CPT | Mod: GP | Performed by: PHYSICAL THERAPIST

## 2020-09-01 NOTE — PROGRESS NOTES
Vestibular/Ocular Motor Test:     Not Tested Headache Dizziness Nausea Fogginess Comments   Baseline N/A 3 4 3 2    Smooth Pursuits  3 3 3 2    Saccades-Horizontal  3 3 3 2    Saccades-Vertical  3 3 3 2    Convergence (Near Point)  3 3 4 2 (Near Point in CM)  Measure 1: 6  Measure 2: 13  Measure 3 26   VOR Horizontal  3 3 4 2    VOR Vertical  3 3 3 2    Visual Motion Sensitivity Test  3 5 4 2 Dizzy, off balance       Kris Hoenk, PT #[id number]  [facility]  [phone]
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
## 2020-09-02 ENCOUNTER — HOSPITAL ENCOUNTER (OUTPATIENT)
Dept: NEUROLOGY | Facility: CLINIC | Age: 55
Setting detail: THERAPIES SERIES
Discharge: STILL ADMITTED OR ADMINISTRATIVE | End: 2020-09-02
Attending: CLINICAL NEUROPSYCHOLOGIST

## 2020-09-02 ENCOUNTER — TRANSFERRED RECORDS (OUTPATIENT)
Dept: HEALTH INFORMATION MANAGEMENT | Facility: CLINIC | Age: 55
End: 2020-09-02

## 2020-09-02 ENCOUNTER — AMBULATORY - HEALTHEAST (OUTPATIENT)
Dept: NEUROLOGY | Facility: CLINIC | Age: 55
End: 2020-09-02

## 2020-09-02 DIAGNOSIS — F07.81 POST CONCUSSION SYNDROME: ICD-10-CM

## 2020-09-02 DIAGNOSIS — F43.23 ADJUSTMENT DISORDER WITH MIXED ANXIETY AND DEPRESSED MOOD: ICD-10-CM

## 2020-09-02 DIAGNOSIS — R41.3 MEMORY DIFFICULTIES: ICD-10-CM

## 2020-09-08 ENCOUNTER — HOSPITAL ENCOUNTER (OUTPATIENT)
Dept: OCCUPATIONAL THERAPY | Facility: CLINIC | Age: 55
Setting detail: THERAPIES SERIES
End: 2020-09-08
Attending: NURSE PRACTITIONER
Payer: COMMERCIAL

## 2020-09-08 PROCEDURE — 97165 OT EVAL LOW COMPLEX 30 MIN: CPT | Mod: GO | Performed by: OCCUPATIONAL THERAPIST

## 2020-09-08 PROCEDURE — 97535 SELF CARE MNGMENT TRAINING: CPT | Mod: GO | Performed by: OCCUPATIONAL THERAPIST
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
## 2020-09-09 ENCOUNTER — HOSPITAL ENCOUNTER (OUTPATIENT)
Dept: PHYSICAL THERAPY | Facility: CLINIC | Age: 55
Setting detail: THERAPIES SERIES
End: 2020-09-09
Attending: NURSE PRACTITIONER
Payer: COMMERCIAL

## 2020-09-09 ENCOUNTER — COMMUNICATION - HEALTHEAST (OUTPATIENT)
Dept: NEUROLOGY | Facility: CLINIC | Age: 55
End: 2020-09-09

## 2020-09-09 PROCEDURE — 97112 NEUROMUSCULAR REEDUCATION: CPT | Mod: GP | Performed by: PHYSICAL THERAPIST

## 2020-09-10 ENCOUNTER — HOSPITAL ENCOUNTER (OUTPATIENT)
Dept: NEUROLOGY | Facility: CLINIC | Age: 55
Setting detail: THERAPIES SERIES
Discharge: STILL ADMITTED OR ADMINISTRATIVE | End: 2020-09-10
Attending: PSYCHOLOGIST

## 2020-09-10 ENCOUNTER — TRANSFERRED RECORDS (OUTPATIENT)
Dept: HEALTH INFORMATION MANAGEMENT | Facility: CLINIC | Age: 55
End: 2020-09-10

## 2020-09-10 DIAGNOSIS — F41.1 GAD (GENERALIZED ANXIETY DISORDER): ICD-10-CM

## 2020-09-14 ENCOUNTER — HOSPITAL ENCOUNTER (OUTPATIENT)
Dept: OCCUPATIONAL THERAPY | Facility: CLINIC | Age: 55
Setting detail: THERAPIES SERIES
End: 2020-09-14
Attending: NURSE PRACTITIONER
Payer: COMMERCIAL

## 2020-09-14 PROCEDURE — 97530 THERAPEUTIC ACTIVITIES: CPT | Mod: GO | Performed by: OCCUPATIONAL THERAPIST

## 2020-09-16 ENCOUNTER — HOSPITAL ENCOUNTER (OUTPATIENT)
Dept: NEUROLOGY | Facility: CLINIC | Age: 55
Setting detail: THERAPIES SERIES
Discharge: STILL ADMITTED OR ADMINISTRATIVE | End: 2020-09-16
Attending: PSYCHOLOGIST

## 2020-09-16 ENCOUNTER — TRANSFERRED RECORDS (OUTPATIENT)
Dept: HEALTH INFORMATION MANAGEMENT | Facility: CLINIC | Age: 55
End: 2020-09-16

## 2020-09-16 DIAGNOSIS — F41.1 GAD (GENERALIZED ANXIETY DISORDER): ICD-10-CM

## 2020-09-17 ENCOUNTER — OFFICE VISIT (OUTPATIENT)
Dept: OPTOMETRY | Facility: CLINIC | Age: 55
End: 2020-09-17
Payer: COMMERCIAL

## 2020-09-17 DIAGNOSIS — H51.11 CONVERGENCE INSUFFICIENCY: ICD-10-CM

## 2020-09-17 DIAGNOSIS — H52.13 MYOPIA OF BOTH EYES: ICD-10-CM

## 2020-09-17 DIAGNOSIS — F07.81 POSTCONCUSSION SYNDROME: Primary | ICD-10-CM

## 2020-09-17 ASSESSMENT — CUP TO DISC RATIO
OD_RATIO: 0.2
OS_RATIO: 0.2

## 2020-09-17 ASSESSMENT — VISUAL ACUITY
OD_SC+: -1
METHOD: SNELLEN - LINEAR
OS_SC+: +2
OS_SC: 20/40
OD_SC: 20/20

## 2020-09-17 ASSESSMENT — TONOMETRY
OD_IOP_MMHG: 14
OS_IOP_MMHG: 13
IOP_METHOD: ICARE

## 2020-09-17 ASSESSMENT — REFRACTION_MANIFEST
OD_AXIS: 025
OD_CYLINDER: +0.50
OD_SPHERE: -0.75
OS_CYLINDER: SPHERE
OS_SPHERE: -0.75

## 2020-09-17 ASSESSMENT — EXTERNAL EXAM - LEFT EYE: OS_EXAM: NORMAL

## 2020-09-17 ASSESSMENT — SLIT LAMP EXAM - LIDS
COMMENTS: NORMAL
COMMENTS: NORMAL

## 2020-09-17 ASSESSMENT — EXTERNAL EXAM - RIGHT EYE: OD_EXAM: NORMAL

## 2020-09-17 NOTE — PROGRESS NOTES
Ocular health ok. Slight Rx probably contributing to symptoms. Mild CI    Assessment/Plan  (F07.81) Postconcussion syndrome  (primary encounter diagnosis)  Comment: Ocular health appears within normal limits today. Best corrected vision of 20/20 in both eyes.   Plan: Discussed findings with patient. Suspect that uncorrected refractive error accounts for at least part of patient's headaches. Encouraged patient to wear glasses more frequently- in particular when he is engaged in visual tasks that require significant detail. While mild binocular vision dysfunction was noted today, doubt that significant improvement would be offered by more regular convergence therapy. Improved balance and fixation stability may help improve dizziness.     (H51.11) Convergence insufficiency  Comment: Instability more than gross insufficiency  Plan: See above.     (H52.13) Myopia of both eyes  Plan: REFRACTION [09445]        See above. SRx updated and dispensed to patient.       Complete documentation of historical and exam elements from today's encounter can  be found in the full encounter summary report (not reduplicated in this progress  note). I personally obtained the chief complaint(s) and history of present illness. I  confirmed and edited as necessary the review of systems, past medical/surgical  history, family history, social history, and examination findings as documented by  others; and I examined the patient myself. I personally reviewed the relevant tests,  images, and reports as documented above. I formulated and edited as necessary the  assessment and plan and discussed the findings and management plan with the  patient and family.    Tony Merchant OD

## 2020-09-18 ENCOUNTER — HOSPITAL ENCOUNTER (OUTPATIENT)
Dept: PHYSICAL THERAPY | Facility: CLINIC | Age: 55
Setting detail: THERAPIES SERIES
End: 2020-09-18
Attending: NURSE PRACTITIONER
Payer: COMMERCIAL

## 2020-09-18 PROCEDURE — 97112 NEUROMUSCULAR REEDUCATION: CPT | Mod: GP | Performed by: PHYSICAL THERAPIST

## 2020-09-21 ENCOUNTER — HOSPITAL ENCOUNTER (OUTPATIENT)
Dept: OCCUPATIONAL THERAPY | Facility: CLINIC | Age: 55
Setting detail: THERAPIES SERIES
End: 2020-09-21
Attending: NURSE PRACTITIONER
Payer: COMMERCIAL

## 2020-09-21 PROCEDURE — 97530 THERAPEUTIC ACTIVITIES: CPT | Mod: GO | Performed by: OCCUPATIONAL THERAPIST

## 2020-09-21 NOTE — PROGRESS NOTES
Outpatient Occupational Therapy Discharge Note     Patient: Luisito Johnston  : 1965    Beginning/End Dates of Reporting Period:  2020 to 2020    Referring Provider: Iraida Holland NP    Therapy Diagnosis: Post Concussion Syndrome and Memory Difficulties    Client Self Report: Faby feels he is doing much better, getting things done, sleeping well.  He was pleased with his appointment with the .  He voices that after the last session he started feeling much better, not dizzy at all.    Objective Measurements:    Goals:     Goal Identifier HEP   Goal Description Luisito will verbalize and demonstrate sensory and symptom management strategies on a daily basis allowing for a decrease in scoring from 47 to 20 or less on symptom rating sheet   Target Date 20   Date Met   2020   Progress:  Scoring a 7 with mild head pressure, mood changes, fogginess, trouble concentrating, memory and confusion.  Greatly improved from 47.     Goal Identifier Visual Reaction   Goal Description Luisito will demonstrate a safe driving reactions using dynavision of 52+ on mode A   Target Date 20   Date Met   2020   Progress:  Met:  Scoring 63pts.  Demonstrating safe visual reactions for driving     Goal Identifier Memory    Goal Description Luisito will implement memory strategies daily in prep of recall of information provided to him by family   Target Date 20   Date Met   2020   Progress:  Met:  Memory strategies including writing of information, voicing information, minimizing distractions while taking in information.  It is also noted that during the visual memory task he was able to attend and score 90% on easy level and 70% on medium level       Progress Toward Goals:   Progress this reporting period: as noted above    Plan:  Discharge from therapy.    Discharge:    Reason for Discharge: Patient has met all goals.    Equipment Issued: n/a    Discharge Plan: Patient to  continue home program: focusing on pacing

## 2020-09-23 ENCOUNTER — TRANSFERRED RECORDS (OUTPATIENT)
Dept: HEALTH INFORMATION MANAGEMENT | Facility: CLINIC | Age: 55
End: 2020-09-23

## 2020-09-23 ENCOUNTER — HOSPITAL ENCOUNTER (OUTPATIENT)
Dept: NEUROLOGY | Facility: CLINIC | Age: 55
Setting detail: THERAPIES SERIES
Discharge: STILL ADMITTED OR ADMINISTRATIVE | End: 2020-09-23
Attending: PSYCHOLOGIST

## 2020-09-23 DIAGNOSIS — F41.1 GAD (GENERALIZED ANXIETY DISORDER): ICD-10-CM

## 2020-09-30 ENCOUNTER — HOSPITAL ENCOUNTER (OUTPATIENT)
Dept: NEUROLOGY | Facility: CLINIC | Age: 55
Setting detail: THERAPIES SERIES
Discharge: STILL ADMITTED OR ADMINISTRATIVE | End: 2020-09-30
Attending: CLINICAL NEUROPSYCHOLOGIST

## 2020-09-30 DIAGNOSIS — G31.84 MILD NEUROCOGNITIVE DISORDER: ICD-10-CM

## 2020-10-06 ENCOUNTER — HOSPITAL ENCOUNTER (OUTPATIENT)
Dept: NEUROLOGY | Facility: CLINIC | Age: 55
Setting detail: THERAPIES SERIES
Discharge: STILL ADMITTED OR ADMINISTRATIVE | End: 2020-10-06
Attending: NURSE PRACTITIONER

## 2020-10-06 ENCOUNTER — TRANSFERRED RECORDS (OUTPATIENT)
Dept: HEALTH INFORMATION MANAGEMENT | Facility: CLINIC | Age: 55
End: 2020-10-06

## 2020-10-06 DIAGNOSIS — G47.00 INSOMNIA, UNSPECIFIED TYPE: ICD-10-CM

## 2020-10-06 DIAGNOSIS — R42 DIZZINESS: ICD-10-CM

## 2020-10-06 DIAGNOSIS — R45.4 IRRITABILITY: ICD-10-CM

## 2020-10-06 DIAGNOSIS — R68.89 SENSITIVITY TO LIGHT: ICD-10-CM

## 2020-10-06 DIAGNOSIS — F06.4 ANXIETY DISORDER DUE TO MEDICAL CONDITION: ICD-10-CM

## 2020-10-06 DIAGNOSIS — G44.309 POST-CONCUSSION HEADACHE: ICD-10-CM

## 2020-10-06 DIAGNOSIS — F07.81 POSTCONCUSSION SYNDROME: ICD-10-CM

## 2020-10-06 DIAGNOSIS — R53.83 FATIGUE, UNSPECIFIED TYPE: ICD-10-CM

## 2020-10-06 DIAGNOSIS — R41.840 ATTENTION AND CONCENTRATION DEFICIT: ICD-10-CM

## 2020-10-06 DIAGNOSIS — R41.3 MEMORY DIFFICULTIES: ICD-10-CM

## 2020-10-06 DIAGNOSIS — H83.3X3 SOUND SENSITIVITY IN BOTH EARS: ICD-10-CM

## 2020-10-13 ENCOUNTER — HOSPITAL ENCOUNTER (OUTPATIENT)
Dept: NEUROLOGY | Facility: CLINIC | Age: 55
Setting detail: THERAPIES SERIES
Discharge: STILL ADMITTED OR ADMINISTRATIVE | End: 2020-10-13
Attending: CLINICAL NEUROPSYCHOLOGIST

## 2020-10-13 ENCOUNTER — TRANSFERRED RECORDS (OUTPATIENT)
Dept: HEALTH INFORMATION MANAGEMENT | Facility: CLINIC | Age: 55
End: 2020-10-13

## 2020-10-14 ENCOUNTER — TRANSFERRED RECORDS (OUTPATIENT)
Dept: HEALTH INFORMATION MANAGEMENT | Facility: CLINIC | Age: 55
End: 2020-10-14

## 2020-10-14 ENCOUNTER — HOSPITAL ENCOUNTER (OUTPATIENT)
Dept: NEUROLOGY | Facility: CLINIC | Age: 55
Setting detail: THERAPIES SERIES
Discharge: STILL ADMITTED OR ADMINISTRATIVE | End: 2020-10-14
Attending: PSYCHOLOGIST

## 2020-10-14 DIAGNOSIS — F41.1 GAD (GENERALIZED ANXIETY DISORDER): ICD-10-CM

## 2020-10-22 NOTE — PROGRESS NOTES
"  Luisito S Zebrglenn   PHYSICAL THERAPY DISCHARGE  09/18/20 0900   Signing Clinician's Name / Credentials   Signing clinician's name / credentials Kris Hoenk, PT   Session Number   Session Number 3/20 UC no cert, seen from 9/1-9/18/20, pt cancelled last visit   Adult Goals   PT Eval Goals 1;2;3   Goal 1   Goal Identifier 1 change to 10 or less, met 20 9/9   Goal Description concussion symtpom score to 20 or less to demonstrate deceraed sx and improved activity tolerance in 8wk  (17)   Target Date 11/01/20   Goal 2   Goal Identifier 2   Goal Description pt will have no dizziness with changing positions/bending oevr in 6wk  (not feeling much)   Target Date 10/13/20   Goal 3   Goal Identifier 3   Goal Description pt will be elisa to do all regular house/yard work without increase in sx in8wk  (stillhas mild sx)   Target Date 11/01/20   Subjective Report   Subjective Report starting to feel a bit better, saw eye doctor, nothing structural wrong, memory/concentrating seem biggest part, dizziness better, ringing in ear still, states he is not as irritable   Neuromuscular Re-education   Neuromuscular re-ed of mvmt, balance, coord, kinesthetic sense, posture, proprioception minutes (37088) 25   Skilled Intervention balance and oculomotor exercises, review and modify for HEP   Patient Response ringing in ear increased thru session   Treatment Detail balance FT EO and FT EC with horiz and vert head turns x10- 1-2 sec of sx with EC, VOR cx tracking ball with trunk rot x10, diagonals hi/low x5 each, gait in hallway 50ft horiz x2, vert x1 no sx no path deviation, foam FT EO horiz head turn x10, Ft EC 12 sec, FA 4\" EC 30sec, FA EC horiz head turns x10   Plan   Plan for next session signif sx decreased, see in 10 days - cx last visit  Patient has not been seen in over 30 days and will be discharged at this time.  Final status as above     Total Session Time   Timed Code Treatment Minutes 25   Total Treatment Time (sum of timed and " untimed services) 25   [facility]  Kris Hoenk  PT  [facility]  [address] [place] [address]  [email or web address]   Office: [phone]  Voicemail: [phone]

## 2020-11-10 ENCOUNTER — HOSPITAL ENCOUNTER (OUTPATIENT)
Dept: NEUROLOGY | Facility: CLINIC | Age: 55
Setting detail: THERAPIES SERIES
Discharge: STILL ADMITTED OR ADMINISTRATIVE | End: 2020-11-10
Attending: NURSE PRACTITIONER

## 2020-11-10 ENCOUNTER — TRANSFERRED RECORDS (OUTPATIENT)
Dept: HEALTH INFORMATION MANAGEMENT | Facility: CLINIC | Age: 55
End: 2020-11-10

## 2020-11-10 DIAGNOSIS — R53.83 FATIGUE, UNSPECIFIED TYPE: ICD-10-CM

## 2020-11-10 DIAGNOSIS — F07.81 POSTCONCUSSION SYNDROME: ICD-10-CM

## 2020-11-10 DIAGNOSIS — Z76.89 RETURN TO WORK EVALUATION: ICD-10-CM

## 2020-11-10 DIAGNOSIS — G44.309 POST-CONCUSSION HEADACHE: ICD-10-CM

## 2020-11-10 DIAGNOSIS — G47.00 INSOMNIA, UNSPECIFIED TYPE: ICD-10-CM

## 2020-11-10 DIAGNOSIS — F06.4 ANXIETY DISORDER DUE TO MEDICAL CONDITION: ICD-10-CM

## 2020-11-10 DIAGNOSIS — R41.840 ATTENTION AND CONCENTRATION DEFICIT: ICD-10-CM

## 2020-11-10 DIAGNOSIS — R41.3 MEMORY DIFFICULTIES: ICD-10-CM

## 2020-11-10 ASSESSMENT — MIFFLIN-ST. JEOR: SCORE: 1826.9

## 2020-11-11 ENCOUNTER — MYC MEDICAL ADVICE (OUTPATIENT)
Dept: FAMILY MEDICINE | Facility: CLINIC | Age: 55
End: 2020-11-11

## 2020-11-11 DIAGNOSIS — Z20.822 COVID-19 RULED OUT: Primary | ICD-10-CM

## 2020-11-16 DIAGNOSIS — Z20.822 COVID-19 RULED OUT: ICD-10-CM

## 2020-11-16 PROCEDURE — 36415 COLL VENOUS BLD VENIPUNCTURE: CPT | Performed by: FAMILY MEDICINE

## 2020-11-16 PROCEDURE — 86769 SARS-COV-2 COVID-19 ANTIBODY: CPT | Performed by: FAMILY MEDICINE

## 2020-11-17 LAB
COVID-19 SPIKE RBD ABY TITER: NORMAL
COVID-19 SPIKE RBD ABY: NEGATIVE

## 2020-11-25 ENCOUNTER — MYC REFILL (OUTPATIENT)
Dept: FAMILY MEDICINE | Facility: CLINIC | Age: 55
End: 2020-11-25

## 2020-11-25 DIAGNOSIS — F41.1 GAD (GENERALIZED ANXIETY DISORDER): ICD-10-CM

## 2021-01-10 ENCOUNTER — HEALTH MAINTENANCE LETTER (OUTPATIENT)
Age: 56
End: 2021-01-10

## 2021-01-14 ENCOUNTER — COMMUNICATION - HEALTHEAST (OUTPATIENT)
Dept: NEUROLOGY | Facility: CLINIC | Age: 56
End: 2021-01-14

## 2021-01-18 ENCOUNTER — COMMUNICATION - HEALTHEAST (OUTPATIENT)
Dept: NEUROLOGY | Facility: CLINIC | Age: 56
End: 2021-01-18

## 2021-03-24 ENCOUNTER — AMBULATORY - HEALTHEAST (OUTPATIENT)
Dept: NURSING | Facility: CLINIC | Age: 56
End: 2021-03-24

## 2021-04-20 ENCOUNTER — AMBULATORY - HEALTHEAST (OUTPATIENT)
Dept: NURSING | Facility: CLINIC | Age: 56
End: 2021-04-20

## 2021-05-25 DIAGNOSIS — F41.1 GAD (GENERALIZED ANXIETY DISORDER): ICD-10-CM

## 2021-05-25 NOTE — TELEPHONE ENCOUNTER
Fluoxetine refill request  ROC Trejo 3/26/20 for depression  Routing refill request to provider for review/approval because:  Patient needs to be seen because it has been more than 1 year since last office visit.  PHQ-9 score:    PHQ 3/26/2020   PHQ-9 Total Score 5   Q9: Thoughts of better off dead/self-harm past 2 weeks Not at all

## 2021-05-25 NOTE — LETTER
May 26, 2021    Luisito Johnston  [address]  [place] [facility] [address]    Dear Luisito,       We recently received a refill request for FLUoxetine (PROZAC).  We have refilled this for a one time 30 day supply only because you are due for a     Depression/Mood office visit for FURTHER REFILLS.       Please call at your earliest convenience so that there will not be a delay with your future refills.          Thank you,   Your [facility]  [phone]

## 2021-05-25 NOTE — TELEPHONE ENCOUNTER
Left detailed message on voice mail regarding making an appointment for further refills.  If patient call's back please help him schedule.  Thank you  Maru Huizar

## 2021-06-02 ENCOUNTER — OFFICE VISIT (OUTPATIENT)
Dept: FAMILY MEDICINE | Facility: CLINIC | Age: 56
End: 2021-06-02
Payer: COMMERCIAL

## 2021-06-02 VITALS
WEIGHT: 217 LBS | RESPIRATION RATE: 16 BRPM | OXYGEN SATURATION: 98 % | HEART RATE: 68 BPM | TEMPERATURE: 97.7 F | SYSTOLIC BLOOD PRESSURE: 112 MMHG | HEIGHT: 71 IN | BODY MASS INDEX: 30.38 KG/M2 | DIASTOLIC BLOOD PRESSURE: 78 MMHG

## 2021-06-02 DIAGNOSIS — Z71.89 ADVANCED DIRECTIVES, COUNSELING/DISCUSSION: ICD-10-CM

## 2021-06-02 DIAGNOSIS — F41.1 GAD (GENERALIZED ANXIETY DISORDER): ICD-10-CM

## 2021-06-02 PROCEDURE — 99213 OFFICE O/P EST LOW 20 MIN: CPT | Performed by: FAMILY MEDICINE

## 2021-06-02 ASSESSMENT — PATIENT HEALTH QUESTIONNAIRE - PHQ9
SUM OF ALL RESPONSES TO PHQ QUESTIONS 1-9: 11
5. POOR APPETITE OR OVEREATING: SEVERAL DAYS

## 2021-06-02 ASSESSMENT — ANXIETY QUESTIONNAIRES
3. WORRYING TOO MUCH ABOUT DIFFERENT THINGS: SEVERAL DAYS
IF YOU CHECKED OFF ANY PROBLEMS ON THIS QUESTIONNAIRE, HOW DIFFICULT HAVE THESE PROBLEMS MADE IT FOR YOU TO DO YOUR WORK, TAKE CARE OF THINGS AT HOME, OR GET ALONG WITH OTHER PEOPLE: SOMEWHAT DIFFICULT
6. BECOMING EASILY ANNOYED OR IRRITABLE: MORE THAN HALF THE DAYS
2. NOT BEING ABLE TO STOP OR CONTROL WORRYING: SEVERAL DAYS
5. BEING SO RESTLESS THAT IT IS HARD TO SIT STILL: MORE THAN HALF THE DAYS
7. FEELING AFRAID AS IF SOMETHING AWFUL MIGHT HAPPEN: SEVERAL DAYS
1. FEELING NERVOUS, ANXIOUS, OR ON EDGE: MORE THAN HALF THE DAYS
GAD7 TOTAL SCORE: 10

## 2021-06-02 ASSESSMENT — MIFFLIN-ST. JEOR: SCORE: 1831.44

## 2021-06-02 NOTE — PROGRESS NOTES
"SUBJECTIVE:  SUBJECTIVE:  55 year old.The patient has a history of  follow-up of depressive symptoms.    Since last visit the patient had a concussion 9 months and had counseling.  He feels he has been improved. Notes from that visit reviewed. PHQ-9 has been reviewed.  Current symptoms include: Anxiety and Depressed Mood   Symptoms that have subjectively improved include: Anxiety and Depressed Mood  Previous and current treatment modalities employed include: Medications   Prozac (Fluoxetine)  Patient Active Problem List   Diagnosis     CARDIOVASCULAR SCREENING; LDL GOAL LESS THAN 160     Generalized anxiety disorder     Generalized anxiety disorder     ALLAN (obstructive sleep apnea)     Nocturnal hypoxemia     Advanced directives, counseling/discussion      Reviewed health maintenance  OBJECTIVE:  no apparent distress  /78   Pulse 68   Temp 97.7  F (36.5  C) (Tympanic)   Resp 16   Ht 1.803 m (5' 11\")   Wt 98.4 kg (217 lb)   SpO2 98%   BMI 30.27 kg/m         MENTAL STATUS EXAM:   1. Clinical observations:Patient was clean and was adequately groomed. Patient's emotional presentation was cooperative and sima/open. Patient spoke clearly and articulately. Patient maintained adequate eye contact and was cooperative in answering questions.  2. Patient appeared to be well-oriented in all spheres with coherent, logical, goal directed and relevent thinking.  3. Thought content: Denies auditory and visual hallucinations or delusions.  4. Affect and mood: Affect is alert and her emotional attitude is described as normal.  5. Sensorium and cognition: Patient was in contact with reality and oriented to place, time, person and situation. patient demonstrated no impairment in immediate, recent, or remote memory. patient's insight was adequate without obvious deficits in intelligence.    ICD-10-CM    1. Advanced directives, counseling/discussion  Z71.89     PLAN:    :         "

## 2021-06-03 ASSESSMENT — ANXIETY QUESTIONNAIRES: GAD7 TOTAL SCORE: 10

## 2021-06-05 VITALS — HEIGHT: 71 IN | BODY MASS INDEX: 30.24 KG/M2 | WEIGHT: 216 LBS

## 2021-06-10 NOTE — PROGRESS NOTES
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    Diagnosis:  Mild Traumatic Brain Injury, resolving  Mild Neurocognitive Disorder due to Traumatic Brain Injury  Adjustment Disorder with Mixed Anxiety and Depressed Mood  Unspecified Anxiety Disorder (by history)    For diagnostic and coding purposes, Mr. Johnston has a history of July 11th concussion and was referred for an evaluation of Mild Neurocognitive Disorder due to Traumatic Brain Injury. Feedback of results will be provided via a formal feedback appointment with me on September 2nd.      Cordelia Echols, PhD, LP, ABPP  Clinical Neuropsychologist, LP#[id number]  Board Certified in Clinical Neuropsychology    [facility]  [address], [address]  [place] [address]  Phone:  [phone]

## 2021-06-10 NOTE — PROGRESS NOTES
The patient was seen for a neuropsychological evaluation for the purposes of diagnostic clarification and treatment planning. 51 minutes of telehealth testing were provided by this writer. An additional 20 minutes were spent scoring and compiling test results.The patient was cooperative with testing. No concerns were brought to my attention. Please see Dr. Echols's report for a detailed description of the charges and interpretation and integration of the findings.    Testing start: 1109 (8/17)  Testing stop: 1200 (8/17)  Scoring start: 1115 (818)  Scoring Stop: 1135 (8/18)
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
## 2021-06-11 NOTE — PROGRESS NOTES
"NEUROPSYCHOLOGY TELE-HEALTH FEEDBACK VISIT  [facility]    Video Visit  Luisito Johnston is a 55 y.o. male who is being evaluated via a billable video visit.      The patient has been notified of the following:     \"This video visit will be conducted via a call between you and your provider. We have found that certain health care needs can be provided without the need for an in-person physical exam.  This service lets us provide the care you need with a video conversation. If during the course of the call the physician/provider feels a video visit is not appropriate, you will not be charged for this service.\"    Patient has given verbal consent to a Video visit? Yes  Consent has been obtained for this service by 1 care team member: yes.    Patient would like the video invitation sent by: [email or web address]    Visit Summary  The purpose of today s appointment was to provide feedback regarding Mr. Johnston's recent neuropsychological Tele-Health consult completed on August 17th. We began the session by discussing his experience during the evaluation. I provided Mr. Johnston with detailed feedback regarding his performance on cognitive testing and his pattern of cognitive strengths and weaknesses including impairments in attention, executive skills and memory.  I discussed my overall impressions and recommendations including likely residual cognitive sequela of his July concussion exacerbated by persisting physical and emotional (also likely worsened by the concussion) symptoms. He shared that he has recently started amitriptyline and this has dramatically improved his sleep and he has also just started physical therapy and was given exercises to help with visual focus. He also explained that while he completed an intake for psychotherapy, he was told that the wait list was 2-3 months. I offered to connect him with our in house psychologist who works with concussion patients and he was open to that " referral and I will place that today. I also shared that given his current cognitive weaknesses, I would like to see him back in about a month to monitor his cognition. I provided the opportunity for Mr. Johnston to ask any questions about the evaluation. At the end of the session, he indicated that he understood the results and that I had answered all of his questions. He was encouraged to reach out to me (contact information included in the AVS) should any further questions or concerns arise in the future. Impressions and recommendations from the report were provided as part of the After Visit Summary which was directed to clinic staff to print and send by mail.      Cordelia Echols, PhD, LP, ABPP  Clinical Neuropsychologist, LP#[id number]  Board Certified in Clinical Neuropsychology    [address], [address]  [place] [address]  Phone:  [phone]    Video-Visit Details    Type of service:  Video Visit  Start Time: 1000am  End Time: 1040am    Originating Location (pt. Location): Home    Distant Location (provider location):  Remote work for [facility]    Mode of Communication:  Video Conference via [email or web address]    For diagnostic and coding purposes, Mr. Johnston has a history of July 11th concussion and was referred for an evaluation of Mild Neurocognitive Disorder due to Traumatic Brain Injury.  My diagnostic impressions from the 8/17/2020 evaluation included    Diagnosis:   Mild Traumatic Brain Injury, resolving  Mild Neurocognitive Disorder due to Traumatic Brain Injury  Adjustment Disorder with Mixed Anxiety and Depressed Mood  Unspecified Anxiety Disorder (by history)    As this is the final date for this Episode of Care (initiated on 8/17/2020) all charges for the entire Episode of Care will be filed today. Please see the 8/17/2020 evaluation for a detailed description of codes  and services, including services provided today.      In brief:   1 x 96116  1 x 96132  1 x 96133  1 x 96138  1 x 96139

## 2021-06-11 NOTE — PATIENT INSTRUCTIONS - HE
DIAGNOSTIC IMPRESSIONS (from August 17th Neuropsychology Consult):  Mild Traumatic Brain Injury, resolving  Mild Neurocognitive Disorder due to Traumatic Brain Injury  Adjustment Disorder with Mixed Anxiety and Depressed Mood  Unspecified Anxiety Disorder (by history)     RECOMMENDATIONS:  1) Time was spent discussing the pathophysiology of concussion injury, normalizing the patient's experience, and providing education about the anticipated recovery trajectory. He was open to education about the fact that people recover from mild traumatic brain injuries and that his cognitive inefficiencies and physical symptoms will not be permanent and are expected to continue to improved steadily in coming weeks. He will benefit from hearing this clear and consistent message about recovery from concussion.     2) We discussed how physical, emotional, and cognitive symptoms are highly interconnected and influence one another. Education was provided on the impact of emotional distress and how that can cause or exacerbate cognitive inefficiencies, physical symptoms (e.g., blurred vision, nausea, etc.), and sleep disturbances. I reinforced the need for outpatient psychotherapy and the importance of treating his mood symptoms to help optimize his recovery.     3) We discussed the fact that total avoidance of screens, reading, and engaging in the normal activities of daily living is not necessary. Education was provided about the fact that some activities may temporarily flare-up his symptoms, but that it is important for him to re-engage in them slowly and steadily so that he may be able to build up his tolerance for normal activities. The one exception is driving, which he should not do if he is experiencing significant physical symptoms/fatigue. We also reviewed the importance of taking rest breaks after persistent engagement in cognitively and physically demanding tasks. We discussed the rule of 50/10, during which 50 minutes of  "cognitively demanding tasks are followed by a 10 minute break to relax and \"power down\" so to help delay/prevent a flare-up of physical symptoms.    4) We discussed the benefits of compensatory and organizational strategies to optimize his functioning in daily life. These include utilizing note pads, checklists, to-do lists, a calendar/planner, alarm reminders, a pillbox, a GPS, and maintaining a daily routine and an organized living/work environment.    5) Return for follow-up with neuropsychology in approximately one month in order to monitor the patient's cognitive status, clarify etiology, and update recommendations.    "
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
## 2021-06-11 NOTE — PROGRESS NOTES
The patient was seen for a neuropsychological evaluation for the purposes of diagnostic clarification and treatment planning. 38 minutes of telehealth testing were provided by this writer. An additional 16 minutes were spent scoring and compiling test results.The patient was cooperative with testing. No concerns were brought to my attention. Please see Dr. Echols's report for a detailed description of the charges and interpretation and integration of the findings.    Testing start: 1248 (9/30)  Testing stop: 1326 (9/30)  Scoring start: 1013 (10/1)  Scoring Stop: 1029 (10/1)
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
## 2021-06-12 NOTE — PATIENT INSTRUCTIONS - HE
DIAGNOSTIC IMPRESSIONS:  Mild Traumatic Brain Injury, resolving     RECOMMENDATIONS:  1) Mr. Johnston should be reassured of his intact cognitive skills and that he has made a good cognitive recovery from his July concussion.    2) Mr. Johnston is encouraged to continue with mental health interventions to help maintain good management of mood symptoms. This includes psychiatric interventions and psychotherapy as needed (to be determined by Dr. Strickland).     3) Mr. Johnston is cleared, from a cognitive perspective, to return to work     4) No further follow-up with neuropsychology is needed. However, Mr. Johnston is encouraged to contact our service at any time should cognitive issues arise in the future.

## 2021-06-12 NOTE — PROGRESS NOTES
Psychology Progress Note    Date: October 14, 2020    Time length and type of treatment: 18 minutes (11:04 AM to 11:22 AM), individual therapy    After review of the patient's situation, this visit was changed from an in-person visit to a  video visit via [employer] to reduce the risk of COVID 19 exposure. Patient was informed that policies and procedures that govern in-person sessions would also apply to  video sessions. Patient was also informed that  video sessions would be discontinued when COVID 19 exposure is no longer a concern (as determined by [facility]).     Patient location: Patient home in [place], [place]  Provider location:  [facility], [place], [place]    Patient was in agreement with proceeding with a  video session.      Necessity: This session is necessary to address the patient's anxiety and depressed mood.  Today we focus on the patient's treatment plan, specifically exploring thoughts and expectations about self and others.  The reader is invited to review the patient's full treatment plan in the Media section of the patient's Epic medical record.    Intervention: This writer utilized motivational interviewing, active listening, reassurance and support in the context of cognitive behavioral therapy to address the above.      Mental Status:   Grooming: Within normal limits  Attire: Appropriate  Age: Appears Stated  Behavior Towards Examiner: Cooperative  Motor Activity: Within normal   Eye Contact: Appropriate  Mood: Euthymic  Affect: Congruent w/content of speech  Speech/Language: Within normal  Attention: Within normal  Concentration: Within normal  Thought Process: Within normal  Thought Content: No evidence of delusions or hallucinations  Orientation: Appeared oriented to person, place, and time, though not formally established  Memory: No Evidence of Impairment  Judgement: No Evidence of Impairment  Estimated Intelligence: Average  Demonstrated Insight:  Adequate  Fund of Knowledge: adequate      Progress:   Patient reported that he has continued to improve and believes he has returned to baseline.  He denied any current depression or anxiety, and denied all symptoms on the PHQ-9 and MELIDA-7.  Patient expressed appreciation for the treatment he received, especially the normalization of his experiences, which he found reassuring.  He has even been job hunting and is very optimistic that he will be offered a position that is a very good fit for his skill set.      Plan:   Patient concerns are resolved and no further follow up is planned.     Diagnosis:  Generalized Anxiety Disorder, resolved  Adjustment Disorder with depressed mood, resolved
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
## 2021-06-19 DIAGNOSIS — F41.1 GAD (GENERALIZED ANXIETY DISORDER): ICD-10-CM

## 2021-07-03 NOTE — ADDENDUM NOTE
Addendum Note by Yolis Guerrero at 8/6/2020  9:30 AM     Author: Yolis Guerrero Service: -- Author Type: --    Filed: 9/16/2020 12:56 PM Date of Service: 8/6/2020  9:30 AM Status: Signed    : Yolis Guerrero    Encounter addended by: Yolis Guerrero on: 9/16/2020 12:56 PM      Actions taken: Charge Capture section accepted

## 2021-10-23 ENCOUNTER — HEALTH MAINTENANCE LETTER (OUTPATIENT)
Age: 56
End: 2021-10-23

## 2021-12-27 ENCOUNTER — E-VISIT (OUTPATIENT)
Dept: FAMILY MEDICINE | Facility: CLINIC | Age: 56
End: 2021-12-27
Payer: COMMERCIAL

## 2021-12-27 DIAGNOSIS — F41.1 GAD (GENERALIZED ANXIETY DISORDER): ICD-10-CM

## 2021-12-27 PROCEDURE — 99421 OL DIG E/M SVC 5-10 MIN: CPT | Performed by: FAMILY MEDICINE

## 2021-12-27 ASSESSMENT — ANXIETY QUESTIONNAIRES
7. FEELING AFRAID AS IF SOMETHING AWFUL MIGHT HAPPEN: NOT AT ALL
GAD7 TOTAL SCORE: 4
2. NOT BEING ABLE TO STOP OR CONTROL WORRYING: NOT AT ALL
GAD7 TOTAL SCORE: 4
GAD7 TOTAL SCORE: 4
1. FEELING NERVOUS, ANXIOUS, OR ON EDGE: SEVERAL DAYS
8. IF YOU CHECKED OFF ANY PROBLEMS, HOW DIFFICULT HAVE THESE MADE IT FOR YOU TO DO YOUR WORK, TAKE CARE OF THINGS AT HOME, OR GET ALONG WITH OTHER PEOPLE?: NOT DIFFICULT AT ALL
6. BECOMING EASILY ANNOYED OR IRRITABLE: SEVERAL DAYS
7. FEELING AFRAID AS IF SOMETHING AWFUL MIGHT HAPPEN: NOT AT ALL
5. BEING SO RESTLESS THAT IT IS HARD TO SIT STILL: NOT AT ALL
4. TROUBLE RELAXING: SEVERAL DAYS
3. WORRYING TOO MUCH ABOUT DIFFERENT THINGS: SEVERAL DAYS

## 2021-12-27 ASSESSMENT — PATIENT HEALTH QUESTIONNAIRE - PHQ9
SUM OF ALL RESPONSES TO PHQ QUESTIONS 1-9: 3
SUM OF ALL RESPONSES TO PHQ QUESTIONS 1-9: 3
10. IF YOU CHECKED OFF ANY PROBLEMS, HOW DIFFICULT HAVE THESE PROBLEMS MADE IT FOR YOU TO DO YOUR WORK, TAKE CARE OF THINGS AT HOME, OR GET ALONG WITH OTHER PEOPLE: NOT DIFFICULT AT ALL

## 2021-12-28 ASSESSMENT — PATIENT HEALTH QUESTIONNAIRE - PHQ9: SUM OF ALL RESPONSES TO PHQ QUESTIONS 1-9: 3

## 2021-12-28 ASSESSMENT — ANXIETY QUESTIONNAIRES: GAD7 TOTAL SCORE: 4

## 2022-02-12 ENCOUNTER — HEALTH MAINTENANCE LETTER (OUTPATIENT)
Age: 57
End: 2022-02-12

## 2022-06-07 ENCOUNTER — HOSPITAL ENCOUNTER (EMERGENCY)
Facility: CLINIC | Age: 57
Discharge: HOME | End: 2022-06-07
Attending: EMERGENCY MEDICINE | Admitting: EMERGENCY MEDICINE
Payer: COMMERCIAL

## 2022-06-07 ENCOUNTER — APPOINTMENT (OUTPATIENT)
Dept: CT IMAGING | Facility: CLINIC | Age: 57
End: 2022-06-07
Attending: EMERGENCY MEDICINE
Payer: COMMERCIAL

## 2022-06-07 VITALS
RESPIRATION RATE: 18 BRPM | TEMPERATURE: 97.7 F | OXYGEN SATURATION: 96 % | HEART RATE: 80 BPM | WEIGHT: 228 LBS | SYSTOLIC BLOOD PRESSURE: 137 MMHG | BODY MASS INDEX: 31.92 KG/M2 | DIASTOLIC BLOOD PRESSURE: 85 MMHG | HEIGHT: 71 IN

## 2022-06-07 DIAGNOSIS — N20.1 URETEROLITHIASIS: ICD-10-CM

## 2022-06-07 LAB
ALBUMIN UR-MCNC: NEGATIVE MG/DL
ANION GAP SERPL CALCULATED.3IONS-SCNC: 7 MMOL/L (ref 3–14)
APPEARANCE UR: CLEAR
BASOPHILS # BLD AUTO: 0 10E3/UL (ref 0–0.2)
BASOPHILS NFR BLD AUTO: 0 %
BILIRUB UR QL STRIP: NEGATIVE
BUN SERPL-MCNC: 20 MG/DL (ref 7–30)
CALCIUM SERPL-MCNC: 8.9 MG/DL (ref 8.5–10.1)
CHLORIDE BLD-SCNC: 109 MMOL/L (ref 94–109)
CO2 SERPL-SCNC: 24 MMOL/L (ref 20–32)
COLOR UR AUTO: YELLOW
CREAT SERPL-MCNC: 1.66 MG/DL (ref 0.66–1.25)
EOSINOPHIL # BLD AUTO: 0 10E3/UL (ref 0–0.7)
EOSINOPHIL NFR BLD AUTO: 0 %
ERYTHROCYTE [DISTWIDTH] IN BLOOD BY AUTOMATED COUNT: 13.3 % (ref 10–15)
GFR SERPL CREATININE-BSD FRML MDRD: 47 ML/MIN/1.73M2
GLUCOSE BLD-MCNC: 133 MG/DL (ref 70–99)
GLUCOSE UR STRIP-MCNC: NEGATIVE MG/DL
HCT VFR BLD AUTO: 43.6 % (ref 40–53)
HGB BLD-MCNC: 14.8 G/DL (ref 13.3–17.7)
HGB UR QL STRIP: ABNORMAL
IMM GRANULOCYTES # BLD: 0.1 10E3/UL
IMM GRANULOCYTES NFR BLD: 1 %
KETONES UR STRIP-MCNC: 5 MG/DL
LEUKOCYTE ESTERASE UR QL STRIP: NEGATIVE
LYMPHOCYTES # BLD AUTO: 1.1 10E3/UL (ref 0.8–5.3)
LYMPHOCYTES NFR BLD AUTO: 9 %
MCH RBC QN AUTO: 31.2 PG (ref 26.5–33)
MCHC RBC AUTO-ENTMCNC: 33.9 G/DL (ref 31.5–36.5)
MCV RBC AUTO: 92 FL (ref 78–100)
MONOCYTES # BLD AUTO: 0.5 10E3/UL (ref 0–1.3)
MONOCYTES NFR BLD AUTO: 4 %
MUCOUS THREADS #/AREA URNS LPF: PRESENT /LPF
NEUTROPHILS # BLD AUTO: 10.8 10E3/UL (ref 1.6–8.3)
NEUTROPHILS NFR BLD AUTO: 86 %
NITRATE UR QL: NEGATIVE
NRBC # BLD AUTO: 0 10E3/UL
NRBC BLD AUTO-RTO: 0 /100
PH UR STRIP: 6 [PH] (ref 5–7)
PLATELET # BLD AUTO: 221 10E3/UL (ref 150–450)
POTASSIUM BLD-SCNC: 4.1 MMOL/L (ref 3.4–5.3)
RBC # BLD AUTO: 4.74 10E6/UL (ref 4.4–5.9)
RBC URINE: 35 /HPF
SODIUM SERPL-SCNC: 140 MMOL/L (ref 133–144)
SP GR UR STRIP: 1.03 (ref 1–1.03)
UROBILINOGEN UR STRIP-MCNC: 2 MG/DL
WBC # BLD AUTO: 12.6 10E3/UL (ref 4–11)
WBC URINE: 1 /HPF

## 2022-06-07 PROCEDURE — 250N000011 HC RX IP 250 OP 636: Performed by: EMERGENCY MEDICINE

## 2022-06-07 PROCEDURE — 36415 COLL VENOUS BLD VENIPUNCTURE: CPT | Performed by: EMERGENCY MEDICINE

## 2022-06-07 PROCEDURE — 81001 URINALYSIS AUTO W/SCOPE: CPT | Performed by: EMERGENCY MEDICINE

## 2022-06-07 PROCEDURE — 96375 TX/PRO/DX INJ NEW DRUG ADDON: CPT | Performed by: EMERGENCY MEDICINE

## 2022-06-07 PROCEDURE — 85004 AUTOMATED DIFF WBC COUNT: CPT | Performed by: EMERGENCY MEDICINE

## 2022-06-07 PROCEDURE — 82310 ASSAY OF CALCIUM: CPT | Performed by: EMERGENCY MEDICINE

## 2022-06-07 PROCEDURE — 96374 THER/PROPH/DIAG INJ IV PUSH: CPT | Performed by: EMERGENCY MEDICINE

## 2022-06-07 PROCEDURE — 99284 EMERGENCY DEPT VISIT MOD MDM: CPT | Performed by: EMERGENCY MEDICINE

## 2022-06-07 PROCEDURE — 99284 EMERGENCY DEPT VISIT MOD MDM: CPT | Mod: 25 | Performed by: EMERGENCY MEDICINE

## 2022-06-07 PROCEDURE — 74176 CT ABD & PELVIS W/O CONTRAST: CPT

## 2022-06-07 RX ORDER — KETOROLAC TROMETHAMINE 15 MG/ML
15 INJECTION, SOLUTION INTRAMUSCULAR; INTRAVENOUS ONCE
Status: COMPLETED | OUTPATIENT
Start: 2022-06-07 | End: 2022-06-07

## 2022-06-07 RX ORDER — OXYCODONE HYDROCHLORIDE 5 MG/1
5 TABLET ORAL EVERY 6 HOURS PRN
Qty: 10 TABLET | Refills: 0 | Status: SHIPPED | OUTPATIENT
Start: 2022-06-07 | End: 2022-08-24

## 2022-06-07 RX ORDER — TAMSULOSIN HYDROCHLORIDE 0.4 MG/1
0.4 CAPSULE ORAL DAILY
Qty: 7 CAPSULE | Refills: 0 | Status: SHIPPED | OUTPATIENT
Start: 2022-06-07 | End: 2022-06-15

## 2022-06-07 RX ORDER — ONDANSETRON 2 MG/ML
4 INJECTION INTRAMUSCULAR; INTRAVENOUS ONCE
Status: COMPLETED | OUTPATIENT
Start: 2022-06-07 | End: 2022-06-07

## 2022-06-07 RX ORDER — ONDANSETRON 4 MG/1
4 TABLET, ORALLY DISINTEGRATING ORAL EVERY 8 HOURS PRN
Qty: 10 TABLET | Refills: 0 | Status: SHIPPED | OUTPATIENT
Start: 2022-06-07 | End: 2022-08-24

## 2022-06-07 RX ADMIN — ONDANSETRON 4 MG: 2 INJECTION INTRAMUSCULAR; INTRAVENOUS at 19:31

## 2022-06-07 RX ADMIN — KETOROLAC TROMETHAMINE 15 MG: 15 INJECTION, SOLUTION INTRAMUSCULAR; INTRAVENOUS at 19:31

## 2022-06-08 ENCOUNTER — TELEPHONE (OUTPATIENT)
Dept: UROLOGY | Facility: CLINIC | Age: 57
End: 2022-06-08
Payer: COMMERCIAL

## 2022-06-08 NOTE — DISCHARGE INSTRUCTIONS
You have a left sided kidney stone which is causing your pain. Drink plenty of fluids. Take Tamsulosin to help facilitate passage of stone. Ibuprofen (or aleve) for primary pain control. Roxicodone for break-thru pain. Ondansetron for nausea.     Use urine strainer to capture stone.     A referral for Urology follow up has been placed.     If you develop a fever > 100.4, pain with urination, uncontrollable pain, or other new symptoms you find concerning, you should return to the Emergency Department for further evaluation.     You should have your renal function checked in 1-2 weeks.

## 2022-06-08 NOTE — PROGRESS NOTES
Assessment/Plan:    Assessment & Plan   Luisito was seen today for new patient.    Diagnoses and all orders for this visit:    Calculus of ureter  -     CT Abdomen Pelvis w/o Contrast - LOW DOSE; Future  -     Patient Stated Goal: Pass my stone    Hydronephrosis with urinary obstruction due to ureteral calculus    Acute left flank pain    Calculus of kidney    Stone Management Plan  Stone Management 6/9/2022   Urinary Tract Infection No suspicion of infection   Renal Colic Asymptomatic at this time   Renal Failure No suspicion of renal failure   Current CT date 6/7/2022   Right sided stones? Yes   R Number of ureteral stones No ureteral stones   R Number of kidney stones  2   R GSD of kidney stones 2 - 4   R Hydronephrosis None   R Stone Event No current event   R Current Plan Observe   Observe rationale Limited stone burden with good prognosis for spontaneous passage   Left sided stones? Yes   L Number of ureteral stones 1   L GSD of ureteral stones 4   L Location of ureteral stone Proximal   L Number of kidney stones  1   L GSD of kidney stones < 2   L Hydronephrosis Mild   L Stone Event New event   Diagnosis date 6/7/2022   Initial location of primary symptomatic stone Proximal   Initial GSD of primary symptomatic stone 4   L MET Status Initiation   L Current Plan MET   MET 2 week F/U         PLAN    55 yo pleasant M first time stone former with recent ER visit for obstructing left proximal ureteral stones, symptoms improved. Small, bilateral nonobstructing renal stones.    Will proceed with medical expulsive therapy. Risks and benefits were detailed of medical expulsive therapy including probability of stone passage, recurrent renal colic, and requirement of emergency medical and/or surgical care and further imaging. Patient verbalized understanding. Patient agrees with plan as discussed. He will return in 2 weeks with low dose CT scan.    For symptom control, he was prescribed oxycodone, ondansetron and flomax  from ER Over the counter symptom control medications of ibuprofen, Dramamine and Tylenol were recommended.    Video call duration: 19 minutes  24 minutes spent on the date of the encounter doing chart review, history and exam, documentation and further activities per the note    Abeba Loya PA-C  [facility]    Subjective:     HPI  Mr. Luisito Johnston is a 56 year old  male who is being evaluated via a billable video visit by [facility] [place] following [place] ER visit for urolithiasis.    He is a first time unidentified composition stone former. He has no identified modifiable stone risk factors. He has identified non-modifiable stone risks including:  bilateral stones.    He was seen in ER 6/7/22 for acute onset left flank pain, starting one day prior. The pain was constant, sharp and radiated into the left lower back and left lower abdomen. At its worst, pain was 10/10. He took omeprazole without relief. He had associated nausea. Workup was notable for CT reporting an obstructing left UPJ stone. Labs noted mild renal injury with no signs of infection. He was sent home with zofran, flomax, and oxycodone.    He is much better since ER, noting mild left back pain. Pertinent negative current symptoms include:  fever, chills, right flank pain, nausea, vomiting, hematuria, urinary frequency and dysuria.     CT scan from 6/7/22 is personally reviewed and demonstrates a mildly obstructing 4 mm left proximal ureteral stone. Additional small left renal stone. There are 2 nonobstructing right renal stones, largest 3 mm.    Significant labs from presentation include moderate hematuria, no pyuria, negative nitrite, elevated WBC, slightly elevated creatinine and normal potassium.    ROS   A 12 point comprehensive review of systems is negative except for HPI    Past Medical History:   Diagnosis Date     Arthritis     Mother and Father     Chronic sinusitis      Polyposis     Hyperlipidemia      Increased BMI      Psoriasis      Past Surgical History:   Procedure Laterality Date     ARTHROSCOPY KNEE Right 7/14/2017    Procedure: ARTHROSCOPY KNEE;  Arthroscopic Menisectomy Right Knee;  Surgeon: Jim Lemon MD;  Location: [facility] OR     COLONOSCOPY N/A 3/9/2015    Procedure: COLONOSCOPY;  Surgeon: Denys Archuleta MD;  Location: [facility] OR     COLONOSCOPY WITH CO2 INSUFFLATION N/A 3/9/2015    Procedure: COLONOSCOPY WITH CO2 INSUFFLATION;  Surgeon: Denys Archuleta MD;  Location: [facility] OR     ENT SURGERY      polyps from nasal passage.      HC SHLDR ARTHROSCOP,PART ACROMIOPLAS  2000     SHOULDER SURGERY       SURGICAL HISTORY OF -   1991    Chronic Lt Shoulder Dislocation     Current Outpatient Medications   Medication Sig Dispense Refill     FLUoxetine (PROZAC) 20 MG capsule TAKE 1 CAPSULE BY MOUTH EVERY DAY 90 capsule 1     ondansetron (ZOFRAN ODT) 4 MG ODT tab Take 1 tablet (4 mg) by mouth every 8 hours as needed for nausea 10 tablet 0     oxyCODONE (ROXICODONE) 5 MG tablet Take 1 tablet (5 mg) by mouth every 6 hours as needed for severe pain 10 tablet 0     tamsulosin (FLOMAX) 0.4 MG capsule Take 1 capsule (0.4 mg) by mouth daily 7 capsule 0       Allergies   Allergen Reactions     Aspirin Nausea and Vomiting       Social History     Socioeconomic History     Marital status:      Spouse name: Not on file     Number of children: Not on file     Years of education: Not on file     Highest education level: Not on file   Occupational History     Not on file   Tobacco Use     Smoking status: Never Smoker     Smokeless tobacco: Never Used   Substance and Sexual Activity     Alcohol use: Yes     Comment: Very little     Drug use: No     Sexual activity: Yes     Partners: Female   Other Topics Concern     Parent/sibling w/ CABG, MI or angioplasty before 65F 55M? No   Social History Narrative     Not on file     Social Determinants of Health     Financial  Resource Strain: Not on file   Food Insecurity: Not on file   Transportation Needs: Not on file   Physical Activity: Not on file   Stress: Not on file   Social Connections: Not on file   Intimate Partner Violence: Not on file   Housing Stability: Not on file       Family History   Problem Relation Age of Onset     Diabetes Father      Respiratory Mother        Objective:     No vitals or physical exam obtained due to virtual visit    LABS  Most Recent 3 CBC's:Recent Labs   Lab Test 06/07/22 1931 06/17/17  0945   WBC 12.6* 11.8*   HGB 14.8 14.8   MCV 92 95    219     Most Recent 3 BMP's:Recent Labs   Lab Test 06/07/22  1931 01/29/15  1023     --    POTASSIUM 4.1  --    CHLORIDE 109  --    CO2 24  --    BUN 20  --    CR 1.66*  --    ANIONGAP 7  --    LISA 8.9  --    * 95     Most Recent Urinalysis:Recent Labs   Lab Test 06/07/22 2027   COLOR Yellow   APPEARANCE Clear   URINEGLC Negative   URINEBILI Negative   URINEKETONE 5 *   SG 1.026   UBLD Moderate*   URINEPH 6.0   PROTEIN Negative   NITRITE Negative   LEUKEST Negative   RBCU 35*   WBCU 1

## 2022-06-08 NOTE — ED PROVIDER NOTES
History     Chief Complaint   Patient presents with     Flank Pain     Left sided flank pain started this am     HPI  Luisito Johnston is a 56 year old male with one day of left sided flank pain.  Acute onset pain this morning in his low back and radiates around to his left lower quadrant of his abdomen.  He has not had similar pain previous.  Took an omeprazole without any improvement.  No prior abdominal surgeries.  Associated nausea but no vomiting.  Pain is constant but happens waves of increased intensity.  Described as sharp and stabbing.  No fevers or chills.  No diarrhea.  Last bowel movement was yesterday morning.  No bowel movement today.  Says he is very regular and has a bowel movement every morning.  No blood in his urine, dysuria, urgency or frequency.    The patient's PMHx, Surgical Hx, Allergies, and Medications were all reviewed with the patient.    Allergies:  Allergies   Allergen Reactions     Aspirin Nausea and Vomiting       Problem List:    Patient Active Problem List    Diagnosis Date Noted     Advanced directives, counseling/discussion 06/02/2021     Priority: Medium     Pt was given info on GERALD Tobar MA       ALLAN (obstructive sleep apnea) 04/04/2018     Priority: Medium     Nocturnal hypoxemia 04/04/2018     Priority: Medium     Generalized anxiety disorder 05/14/2015     Priority: Medium     Diagnosis updated by automated process. Provider to review and confirm.       Generalized anxiety disorder 04/17/2015     Priority: Medium     CARDIOVASCULAR SCREENING; LDL GOAL LESS THAN 160 10/31/2010     Priority: Medium        Past Medical History:    Past Medical History:   Diagnosis Date     Arthritis      Chronic sinusitis      Hyperlipidemia      Increased BMI      Psoriasis        Past Surgical History:    Past Surgical History:   Procedure Laterality Date     ARTHROSCOPY KNEE Right 7/14/2017    Procedure: ARTHROSCOPY KNEE;  Arthroscopic Menisectomy Right Knee;  Surgeon: Jim Lemon  "MD Himanshu;  Location: [facility] OR     COLONOSCOPY N/A 3/9/2015    Procedure: COLONOSCOPY;  Surgeon: Denys Archuleta MD;  Location: [facility]     COLONOSCOPY WITH CO2 INSUFFLATION N/A 3/9/2015    Procedure: COLONOSCOPY WITH CO2 INSUFFLATION;  Surgeon: Denys Archuleta MD;  Location: [facility] OR     ENT SURGERY      polyps from nasal passage.      HC SHLDR ARTHROSCOP,PART ACROMIOPLAS  2000     SHOULDER SURGERY       SURGICAL HISTORY OF -   1991    Chronic Lt Shoulder Dislocation       Family History:    Family History   Problem Relation Age of Onset     Diabetes Father      Respiratory Mother        Social History:  Marital Status:   [2]  Social History     Tobacco Use     Smoking status: Never Smoker     Smokeless tobacco: Never Used   Substance Use Topics     Alcohol use: Yes     Comment: Very little     Drug use: No        Medications:    FLUoxetine (PROZAC) 20 MG capsule  ondansetron (ZOFRAN ODT) 4 MG ODT tab  oxyCODONE (ROXICODONE) 5 MG tablet  tamsulosin (FLOMAX) 0.4 MG capsule          Review of Systems  A complete review of systems performed and is otherwise negative except as noted in the HPI    Physical Exam   BP: 137/85  Pulse: 80  Temp: 97.7  F (36.5  C)  Resp: 18  Height: 180.3 cm (5' 11\")  Weight: 103.4 kg (228 lb) (stated)  SpO2: 96 %    Physical Exam  GEN: Awake, alert, and cooperative.  Appears acutely distressed.  Walking around exam room.  HENT: MMM. External ears and nose normal bilaterally.  EYES: EOM intact. Conjunctiva clear. No discharge.   NECK: Symmetric, freely mobile.   CV : Regular rate and rhythm.  PULM: Normal effort. No wheezes, rales, or rhonchi bilaterally.  ABD: Left lower quadrants palpation.  Soft and nondistended.  Normal active bowel sounds.  No rebound or guarding.   BACK: no CVA tenderness  NEURO: Normal speech. Following commands. CN II-XII grossly intact. Answering questions and interacting appropriately.   EXT: No gross deformity. Warm and well perfused.  INT: Warm. " No diaphoresis. Normal color.        ED Course        Procedures         Critical Care time:  none               Results for orders placed or performed during the hospital encounter of 06/07/22 (from the past 24 hour(s))   CBC with platelets differential    Narrative    The following orders were created for panel order CBC with platelets differential.  Procedure                               Abnormality         Status                     ---------                               -----------         ------                     CBC with platelets and d...[[id number]]  Abnormal            Final result                 Please view results for these tests on the individual orders.   Basic metabolic panel   Result Value Ref Range    Sodium 140 133 - 144 mmol/L    Potassium 4.1 3.4 - 5.3 mmol/L    Chloride 109 94 - 109 mmol/L    Carbon Dioxide (CO2) 24 20 - 32 mmol/L    Anion Gap 7 3 - 14 mmol/L    Urea Nitrogen 20 7 - 30 mg/dL    Creatinine 1.66 (H) 0.66 - 1.25 mg/dL    Calcium 8.9 8.5 - 10.1 mg/dL    Glucose 133 (H) 70 - 99 mg/dL    GFR Estimate 47 (L) >60 mL/min/1.73m2   CBC with platelets and differential   Result Value Ref Range    WBC Count 12.6 (H) 4.0 - 11.0 10e3/uL    RBC Count 4.74 4.40 - 5.90 10e6/uL    Hemoglobin 14.8 13.3 - 17.7 g/dL    Hematocrit 43.6 40.0 - 53.0 %    MCV 92 78 - 100 fL    MCH 31.2 26.5 - 33.0 pg    MCHC 33.9 31.5 - 36.5 g/dL    RDW 13.3 10.0 - 15.0 %    Platelet Count 221 150 - 450 10e3/uL    % Neutrophils 86 %    % Lymphocytes 9 %    % Monocytes 4 %    % Eosinophils 0 %    % Basophils 0 %    % Immature Granulocytes 1 %    NRBCs per 100 WBC 0 <1 /100    Absolute Neutrophils 10.8 (H) 1.6 - 8.3 10e3/uL    Absolute Lymphocytes 1.1 0.8 - 5.3 10e3/uL    Absolute Monocytes 0.5 0.0 - 1.3 10e3/uL    Absolute Eosinophils 0.0 0.0 - 0.7 10e3/uL    Absolute Basophils 0.0 0.0 - 0.2 10e3/uL    Absolute Immature Granulocytes 0.1 <=0.4 10e3/uL    Absolute NRBCs 0.0 10e3/uL   Abd/pelvis CT - no contrast - Stone  Protocol    Narrative    EXAM: CT ABDOMEN PELVIS W/O CONTRAST  LOCATION: [facility]  DATE/TIME: 6/7/2022 8:15 PM    INDICATION: Flank pain, kidney stone suspected  COMPARISON: None.  TECHNIQUE: CT scan of the abdomen and pelvis was performed without IV contrast. Multiplanar reformats were obtained. Dose reduction techniques were used.  CONTRAST: None.    FINDINGS:   LOWER CHEST: Normal.    HEPATOBILIARY: Fatty infiltration of liver.    PANCREAS: Normal.    SPLEEN: Calcified granulomata.    ADRENAL GLANDS: Normal.    KIDNEYS/BLADDER: Obstructing 4 x 3 mm left UPJ stone with mild left hydronephrosis. An additional 3 mm mid pole stone present on the left side.    Right kidney demonstrates 2 separate 2 mm mid pole stones, no right hydronephrosis.    BOWEL: Diverticulosis of the colon. No acute inflammatory change. No obstruction. Left hemicolon collapsed and empty. Appendix normal.    LYMPH NODES: Normal.    VASCULATURE: Unremarkable.    PELVIC ORGANS: Normal.    MUSCULOSKELETAL: Degenerative changes of lumbar spine.      Impression    IMPRESSION:   1.  There is an obstructing 4 x 3 mm left UPJ stone with mild left hydronephrosis.  2.  Additional small kidney stones present bilaterally.  3.  Diffuse fatty infiltration of liver.     UA with Microscopic reflex to Culture    Specimen: Urine, Midstream   Result Value Ref Range    Color Urine Yellow Colorless, Straw, Light Yellow, Yellow    Appearance Urine Clear Clear    Glucose Urine Negative Negative mg/dL    Bilirubin Urine Negative Negative    Ketones Urine 5  (A) Negative mg/dL    Specific Gravity Urine 1.026 1.003 - 1.035    Blood Urine Moderate (A) Negative    pH Urine 6.0 5.0 - 7.0    Protein Albumin Urine Negative Negative mg/dL    Urobilinogen Urine 2.0 Normal, 2.0 mg/dL    Nitrite Urine Negative Negative    Leukocyte Esterase Urine Negative Negative    Mucus Urine Present (A) None Seen /LPF    RBC Urine 35 (H) <=2 /HPF    WBC Urine 1  <=5 /HPF    Narrative    Urine Culture not indicated       Medications   ketorolac (TORADOL) injection 15 mg (15 mg Intravenous Given 6/7/22 1931)   ondansetron (ZOFRAN) injection 4 mg (4 mg Intravenous Given 6/7/22 1931)       Assessments & Plan (with Medical Decision Making)   56 year old male with 1 day of severe colicky low back pain radiating to his left lower quadrant of his abdomen associated nausea.  Patient appeared distressed and was walking around exam room having difficulty finding position of comfort.  No history of renal or urolithiasis.  Had mild left lower quadrant tenderness to palpation on exam.  No CVA tenderness.  Clinically.  As likely kidney stone.  CBC with mild leukocytosis with left shift however no fever.  BMP with creatinine of 1.66 however no prior studies in our system in the last 6 years.  Unable to know if this is a new or old issue.  Urinalysis without evidence of acute infection.  CT scan of abdomen pelvis stone protocol shows a 4 mm obstructing left UPJ stone with mild left hydronephrosis.  Additional small kidney stones present bilaterally.  Patient was given 15 mg IV ketorolac with improvement of symptoms.  Patient did have return of pain prior to final disposition.  Results of CT scan discussed with patient as well as his wife.  Prescription for tamsulosin, ondansetron, and Roxicodone [facility] as it is after hours.  Urology referral and stone clinic placed.  Natural course of kidney stones and likely continued pain during trial of passage discussed with patient.  ED return precautions including fever, dysuria, or uncontrollable pain.  He expressed agreement understanding of plan and was discharged in improved condition.  Was given a urine strainer.    I have reviewed the nursing notes.         New Prescriptions    ONDANSETRON (ZOFRAN ODT) 4 MG ODT TAB    Take 1 tablet (4 mg) by mouth every 8 hours as needed for nausea    OXYCODONE (ROXICODONE) 5 MG TABLET    Take 1  tablet (5 mg) by mouth every 6 hours as needed for severe pain    TAMSULOSIN (FLOMAX) 0.4 MG CAPSULE    Take 1 capsule (0.4 mg) by mouth daily       Final diagnoses:   Ureterolithiasis     Reed Feldman MD    6/7/2022   [facility] EMERGENCY DEPT    Disclaimer: This note consists of words and symbols derived from keyboarding and dictation using voice recognition software.  As a result, there may be errors that have gone undetected.  Please consider this when interpreting information found in this note.             Reed Feldman MD  06/07/22 [id number]

## 2022-06-08 NOTE — TELEPHONE ENCOUNTER
Message left for patient to call clinic to set up an appointment for kidney stone follow-up.  Justine Montes De Oca RN

## 2022-06-09 ENCOUNTER — VIRTUAL VISIT (OUTPATIENT)
Dept: UROLOGY | Facility: CLINIC | Age: 57
End: 2022-06-09
Payer: COMMERCIAL

## 2022-06-09 DIAGNOSIS — N20.0 CALCULUS OF KIDNEY: ICD-10-CM

## 2022-06-09 DIAGNOSIS — N13.2 HYDRONEPHROSIS WITH URINARY OBSTRUCTION DUE TO URETERAL CALCULUS: ICD-10-CM

## 2022-06-09 DIAGNOSIS — N20.1 URETEROLITHIASIS: ICD-10-CM

## 2022-06-09 DIAGNOSIS — R10.9 ACUTE LEFT FLANK PAIN: ICD-10-CM

## 2022-06-09 DIAGNOSIS — N20.1 CALCULUS OF URETER: Primary | ICD-10-CM

## 2022-06-09 PROCEDURE — 99213 OFFICE O/P EST LOW 20 MIN: CPT | Mod: 95 | Performed by: PHYSICIAN ASSISTANT

## 2022-06-09 ASSESSMENT — PAIN SCALES - GENERAL: PAINLEVEL: NO PAIN (0)

## 2022-06-09 NOTE — PROGRESS NOTES
Patient is roomed via telephone for a virtual visit.  Patient confirmed he is in the [facility] at the time of this appointment.  Patient understands that this virtual visit is billable and agree to proceed with appointment.

## 2022-06-09 NOTE — PATIENT INSTRUCTIONS
Patient Stated Goal: Pass my stone  Symptom Control While Passing A Stone    The goal of [facility] is to let a smaller kidney stone (less than 4 to 5 mm) pass without intervention if possible. Giving your body a chance to clear the stone may take a few hours up to a few weeks.  Keeping you well-informed, safe and fairly comfortable is important.    Drink to thirst  Do not attempt to  flush out  your stone by drinking too much fluid. This does not work and may increase nausea. Drink enough to satisfy your body s thirst. Eating your normal diet is fine.   Medications (that may be suggested or prescribed)    Ibuprofen (Advil or Motrin) Available over the counter  Take two (200mg) tablets every six hours until the stone passes.  Prevents spasm of the ureter.    Decreases pain.    Acetaminophen (Tylenol) Available over the counter  Take two (500mg) tablets every six hours as needed  Prevents spasm of the ureter.    Decreases pain.    Dramamine* (drowsy version, non-generic formulation) Available over the counter  Take 50mg at bedtime  Decreases spasms of the ureter  Decreases nausea  Decreases acute pain  Decreases recurrence of pain for 24 hours  Will help you sleep  *This medication will cause increase drowsiness, do not drive or operate machinery for 6 hours.    Narcotics (Oxycodone or Dilaudid) Take as prescribed for severe pain unrelieved by ibuprofen and Dramamine  Narcotics have significant side effects and only  cover-up  pain. They have no effect on the cause of pain.  Common side effects  Confusion, disorientation and sedation - DO NOT DRIVE OR OPERATE MACHINERY WITHIN 24 HOURS  Nausea - take Dramamine or Zofran or Haldol to help control  Constipation  Sleep disturbances    Ondansetron (Zofran) Take as prescribed  Reserve for severe nausea  May cause constipation, start over the counter Miralax if needed    Second Line Anti-Nausea Medication: Adding a different anti-nausea medication maybe  helpful for persistent nausea.  The combined effect of different types of anti-nausea medications maybe more effective than either medication by itself, even in higher doses.  Compazine: Take as prescribed      Information about kidney stones  Crystals can form if chemicals are too concentrated in your urine. If the crystal grows over time, a stone may form. A stone usually isn t painful while it is still in the kidney.  As the stone begins to leave the kidney, you may experience episodes of flank pain as the kidney stone approaches the entrance to the ureter. Some people feel a vague ache in the side.  Kidney stones may fall into the ureter. Some stones are tiny and pass through without causing symptoms. The ureter is a small tube (approximately 1/8 of an inch wide). A kidney stone can get stuck and block the ureter. If this happens, urine backs up and flows back to the kidney. Back pressure on the kidney can cause:  Severe flank pain radiating to the groin.  Severe nausea and vomiting.  The pain can occur in the lower back, side, groin or all three.    When the stone reaches the lower ureter, this can irritate the bladder and sensations of feeling the urge to urinate frequently and urgently may occur.    Once the stone passes out of your ureter and into your bladder, the symptoms of urgency and frequency will often disappear. Sometimes pain will come back for a short period and will not be as severe as before. The passage of the stone from your bladder and out of your body is usually not a problem. The urethra is at least twice as wide as the normal ureter, so the stone doesn t usually block it.    Strain all urine  If you pass the stone, save it. Place it in the container we have provided and bring it to the [facility] within a week of passing it. Your stone will then be sent for analysis which takes about a month.     Signs and symptoms you might experience  Nausea  Decreased appetite  Urinary  frequency  Bloody urine   Chills  Fatigue    When to call [facility] or go to the Emergency Room  Fever with a temperature greater than 100.1  Severe pain  Persistent nausea/vomiting    If the pain worsens or nausea/vomiting is uncontrolled with medications, STOP eating & drinking. You need to have an empty stomach for 8 hours prior to surgery. Call the [facility] immediately at [phone].           Follow-up  Low dose CT scan with doctor visit 1-2 weeks after initial clinic visit per doctor s instructions    Please cancel the CT scan visit if you pass a stone. Reschedule for a one month follow-up with doctor to discuss stone composition and future prevention.    Preventing future stones    Approximately a month after your stone is sent out for analysis, a prevention visit will occur with your provider, to discuss an individualized plan for prevention of new stones and to discuss managing stones that you may still have. Along with the analysis of the kidney stone, blood and urine tests may be indicated to develop this plan. Knowing the type of kidney stones you make, and why, allows the providers at the [facility] to recommend specific ways to prevent them.    Follow-up visits are an important part of monitoring and preventing future re-occurrences.    The [facility] is available for questions or concerns 24 hours a day at [phone]

## 2022-06-15 ENCOUNTER — TELEPHONE (OUTPATIENT)
Dept: UROLOGY | Facility: CLINIC | Age: 57
End: 2022-06-15
Payer: COMMERCIAL

## 2022-06-15 DIAGNOSIS — N20.1 CALCULUS OF URETER: Primary | ICD-10-CM

## 2022-06-15 RX ORDER — TAMSULOSIN HYDROCHLORIDE 0.4 MG/1
0.4 CAPSULE ORAL DAILY
Qty: 14 CAPSULE | Refills: 1 | Status: SHIPPED | OUTPATIENT
Start: 2022-06-15 | End: 2022-08-24

## 2022-06-15 NOTE — TELEPHONE ENCOUNTER
[facility] Call Center    Phone Message    May a detailed message be left on voicemail: yes     Reason for Call: Medication Refill Request    Has the patient contacted the pharmacy for the refill? Yes   Name of medication being requested: tamsulosin (FLOMAX) 0.4 MG capsule    Provider who prescribed the medication: Abeba Loya    Pharmacy: [facility]  [address] [place], [place] [address]    Date medication is needed: asap    Patient requesting more Flomax is possible. Please reach out or send to pharmacy. Thank you      Action Taken: Message routed to:  [facility] VERONICA    Travel Screening: Not Applicable

## 2022-06-28 ENCOUNTER — TELEPHONE (OUTPATIENT)
Dept: UROLOGY | Facility: CLINIC | Age: 57
End: 2022-06-28

## 2022-06-28 ENCOUNTER — HOSPITAL ENCOUNTER (OUTPATIENT)
Dept: CT IMAGING | Facility: CLINIC | Age: 57
Discharge: HOME | End: 2022-06-28
Attending: PHYSICIAN ASSISTANT | Admitting: PHYSICIAN ASSISTANT
Payer: COMMERCIAL

## 2022-06-28 ENCOUNTER — VIRTUAL VISIT (OUTPATIENT)
Dept: UROLOGY | Facility: CLINIC | Age: 57
End: 2022-06-28
Payer: COMMERCIAL

## 2022-06-28 DIAGNOSIS — N20.1 CALCULUS OF URETER: ICD-10-CM

## 2022-06-28 DIAGNOSIS — N20.0 CALCULUS OF KIDNEY: Primary | ICD-10-CM

## 2022-06-28 PROCEDURE — 99213 OFFICE O/P EST LOW 20 MIN: CPT | Mod: 95 | Performed by: PHYSICIAN ASSISTANT

## 2022-06-28 PROCEDURE — 74176 CT ABD & PELVIS W/O CONTRAST: CPT

## 2022-06-28 ASSESSMENT — PAIN SCALES - GENERAL: PAINLEVEL: NO PAIN (0)

## 2022-06-28 NOTE — PROGRESS NOTES
Assessment/Plan:    Assessment & Plan   Luisito was seen today for follow up.    Diagnoses and all orders for this visit:    Calculus of kidney  -     Patient Stated Goal: Prevent further stones    Stone Management Plan  Stone Management 6/9/2022 6/28/2022   Urinary Tract Infection No suspicion of infection No suspicion of infection   Renal Colic Asymptomatic at this time Asymptomatic at this time   Renal Failure No suspicion of renal failure No suspicion of renal failure   Current CT date 6/7/2022 6/28/2022   Right sided stones? Yes Yes   R Number of ureteral stones No ureteral stones No ureteral stones   R Number of kidney stones  2 Renal stones unchanged from last exam   R GSD of kidney stones 2 - 4 2 - 4   R Hydronephrosis None None   R Stone Event No current event No current event   R Current Plan Observe Observe   Observe rationale Limited stone burden with good prognosis for spontaneous passage Limited stone burden with good prognosis for spontaneous passage   Left sided stones? Yes Yes   L Number of ureteral stones 1 No ureteral stones   L GSD of ureteral stones 4 -   L Location of ureteral stone Proximal -   L Number of kidney stones  1 1   L GSD of kidney stones < 2 < 2   L Hydronephrosis Mild Mild   L Stone Event New event -   Diagnosis date 6/7/2022 -   Initial location of primary symptomatic stone Proximal -   Initial GSD of primary symptomatic stone 4 -   L MET Status Initiation -   L Current Plan MET -   MET 2 week F/U -         PLAN    55 yo pleasant M first time stone former with interval passage of obstructing left proximal ureteral stone, no specimen retrieved. Small, bilateral nonobstructing renal stones.    He is congratulated on passing his stone and will proceed with stone risk evaluation Two 24 hour urine collections and dietary journal will be obtained at earliest covenience.    Video call duration: 10 minutes  14 minutes spent on the date of the encounter doing chart review, history and  exam, documentation and further activities per the note    Abeba Loya PA-C  [facility]    HPI  Mr. Luisito Johnston is a 56 year old  male who is being evaluated via a billable video visit by [facility] for medical expulsive therapy follow up.     On last encounter, his 4 mm stone was in left proximal ureter with mild hydronephrosis. He has had no unanticipated events.    Symptoms have been minimal. He is asymptomatic at present. He denies symptoms of fever, chills, flank pain, nausea, vomiting, urinary frequency and dysuria.     New CT scan was personally reviewed and demonstrates passage of stone with mild, residual left hydronephrosis. No change to renal stones.    ROS   Review of systems is negative except for HPI.    Past Medical History:   Diagnosis Date     Arthritis     Mother and Father     Chronic sinusitis     Polyposis     Hyperlipidemia      Increased BMI      Psoriasis      Past Surgical History:   Procedure Laterality Date     ARTHROSCOPY KNEE Right 7/14/2017    Procedure: ARTHROSCOPY KNEE;  Arthroscopic Menisectomy Right Knee;  Surgeon: Jim Lemon MD;  Location: [facility] OR     COLONOSCOPY N/A 3/9/2015    Procedure: COLONOSCOPY;  Surgeon: Denys Archuleta MD;  Location: [facility] OR     COLONOSCOPY WITH CO2 INSUFFLATION N/A 3/9/2015    Procedure: COLONOSCOPY WITH CO2 INSUFFLATION;  Surgeon: Denys Archuleta MD;  Location: [facility] OR     ENT SURGERY      polyps from nasal passage.      HC SHLDR ARTHROSCOP,PART ACROMIOPLAS  2000     SHOULDER SURGERY       SURGICAL HISTORY OF -   1991    Chronic Lt Shoulder Dislocation       Current Outpatient Medications   Medication Sig Dispense Refill     FLUoxetine (PROZAC) 20 MG capsule TAKE 1 CAPSULE BY MOUTH EVERY DAY 90 capsule 1     ondansetron (ZOFRAN ODT) 4 MG ODT tab Take 1 tablet (4 mg) by mouth every 8 hours as needed for nausea 10 tablet 0     oxyCODONE (ROXICODONE) 5  MG tablet Take 1 tablet (5 mg) by mouth every 6 hours as needed for severe pain 10 tablet 0     tamsulosin (FLOMAX) 0.4 MG capsule Take 1 capsule (0.4 mg) by mouth daily 14 capsule 1       Allergies   Allergen Reactions     Aspirin Nausea and Vomiting       Social History     Socioeconomic History     Marital status:      Spouse name: Not on file     Number of children: Not on file     Years of education: Not on file     Highest education level: Not on file   Occupational History     Not on file   Tobacco Use     Smoking status: Never Smoker     Smokeless tobacco: Never Used   Substance and Sexual Activity     Alcohol use: Yes     Comment: Very little     Drug use: No     Sexual activity: Yes     Partners: Female   Other Topics Concern     Parent/sibling w/ CABG, MI or angioplasty before 65F 55M? No   Social History Narrative     Not on file     Social Determinants of Health     Financial Resource Strain: Not on file   Food Insecurity: Not on file   Transportation Needs: Not on file   Physical Activity: Not on file   Stress: Not on file   Social Connections: Not on file   Intimate Partner Violence: Not on file   Housing Stability: Not on file       Family History   Problem Relation Age of Onset     Diabetes Father      Respiratory Mother        Objective:     Appears AAO x 3  No vitals obtained due to virtual visit

## 2022-06-28 NOTE — PROGRESS NOTES
Patient is roomed via telephone for a virtual visit.  Patient confirmed he is in the [facility] at the time of this appointment.  Patient understands that this virtual visit is billable and agree to proceed with appointment.

## 2022-06-28 NOTE — PATIENT INSTRUCTIONS
Patient Stated Goal: Prevent further stones  Steps for collecting a 24 hour urine specimen    Please follow the directions carefully. All urine voided for a 24-hour period needs to be collected into the jug.  DO NOT change any of your  normal  daily habits when doing this test. Continue to follow your regular diet, intake of fluids, and usual activity level. Pick the most convenient day with your schedule, perhaps on a weekend or a day off.    Start your Diet Log the day before collection and continue on the day of urine collection.  You MUST bring Diet Log with you on follow up visit to discuss results.  One 24hr Urine Collection   Two 24hr Urine Collections  (do not collect on consecutive days)    PLEASE COMPLETE THE 2nd JUG WITHIN 1-2 WEEKS FROM THE 1st JUG    STEP 1  Empty your bladder completely into the toilet. This will be your start time. Write your full legal name, start date and time on the jug label.  Collection start and stop times need to match exactly!  For example:  6 am to 6 am.    STEP 2  The next time you urinate, empty your bladder directly into the jug or collection hat and pour urine into the jug.  Screw the lid back onto the jug.  Do not spill!    STEP 3  Place the jug in the refrigerator or a cooler with ice during the collection period.  Failure to keep it cool could cause inaccurate test results. DO NOT Freeze.    STEP 4  Continue collecting all urine into the jug for the rest of the day, for the full 24 hours.  DO NOT stop early or go over 24 hours!    STEP 5  Exactly 24 hours from start of collection, write your full legal name, stop date and time on the jug label.   Collection start and stop times need to match exactly!  For example:  6 am to 6 am.  Failure to label correctly will result in recollection of urine specimen.    STEP 6  Return each jug within 24 hours after final urination.     STEP 7  Drop off jug locations:   Mail back as instructed    STEP 8  Please call [facility] after return  of your final jug to schedule your follow-up visit. [phone]

## 2022-07-05 DIAGNOSIS — F41.1 GAD (GENERALIZED ANXIETY DISORDER): ICD-10-CM

## 2022-07-05 NOTE — TELEPHONE ENCOUNTER
Spoke with pt, he wanted to schedule an annual physical with the med check, pt reports he hasn't had one in years and would like a check up as well. Pt scheduled on 7/27/22.  Linda Huizar

## 2022-07-05 NOTE — TELEPHONE ENCOUNTER
Needs to be seen in the next month by me in office. May add to the last half of a 30 minute appointment. Do not send letter but call to make the appointment.

## 2022-07-26 ASSESSMENT — ENCOUNTER SYMPTOMS
NERVOUS/ANXIOUS: 1
HEMATURIA: 0
SORE THROAT: 0
HEMATOCHEZIA: 0
PALPITATIONS: 0
HEARTBURN: 0
HEADACHES: 0
CONSTIPATION: 0
SHORTNESS OF BREATH: 0
FREQUENCY: 0
DYSURIA: 0
PARESTHESIAS: 0
ARTHRALGIAS: 0
WEAKNESS: 0
JOINT SWELLING: 0
NAUSEA: 0
ABDOMINAL PAIN: 0
FEVER: 0
COUGH: 0
CHILLS: 0
MYALGIAS: 0
EYE PAIN: 0
DIARRHEA: 0
DIZZINESS: 0

## 2022-07-27 ENCOUNTER — OFFICE VISIT (OUTPATIENT)
Dept: FAMILY MEDICINE | Facility: CLINIC | Age: 57
End: 2022-07-27
Payer: COMMERCIAL

## 2022-07-27 VITALS
WEIGHT: 221 LBS | RESPIRATION RATE: 18 BRPM | SYSTOLIC BLOOD PRESSURE: 116 MMHG | DIASTOLIC BLOOD PRESSURE: 79 MMHG | HEART RATE: 87 BPM | TEMPERATURE: 97.3 F | OXYGEN SATURATION: 100 % | BODY MASS INDEX: 30.82 KG/M2

## 2022-07-27 DIAGNOSIS — F41.1 GAD (GENERALIZED ANXIETY DISORDER): ICD-10-CM

## 2022-07-27 DIAGNOSIS — Z00.00 ROUTINE GENERAL MEDICAL EXAMINATION AT A HEALTH CARE FACILITY: ICD-10-CM

## 2022-07-27 DIAGNOSIS — Z11.4 SCREENING FOR HIV (HUMAN IMMUNODEFICIENCY VIRUS): Primary | ICD-10-CM

## 2022-07-27 DIAGNOSIS — Z23 NEED FOR VACCINATION: ICD-10-CM

## 2022-07-27 PROCEDURE — 90750 HZV VACC RECOMBINANT IM: CPT | Performed by: FAMILY MEDICINE

## 2022-07-27 PROCEDURE — 90471 IMMUNIZATION ADMIN: CPT | Performed by: FAMILY MEDICINE

## 2022-07-27 PROCEDURE — 99213 OFFICE O/P EST LOW 20 MIN: CPT | Mod: 25 | Performed by: FAMILY MEDICINE

## 2022-07-27 PROCEDURE — 99396 PREV VISIT EST AGE 40-64: CPT | Mod: 25 | Performed by: FAMILY MEDICINE

## 2022-07-27 ASSESSMENT — ANXIETY QUESTIONNAIRES
1. FEELING NERVOUS, ANXIOUS, OR ON EDGE: SEVERAL DAYS
GAD7 TOTAL SCORE: 3
GAD7 TOTAL SCORE: 3
IF YOU CHECKED OFF ANY PROBLEMS ON THIS QUESTIONNAIRE, HOW DIFFICULT HAVE THESE PROBLEMS MADE IT FOR YOU TO DO YOUR WORK, TAKE CARE OF THINGS AT HOME, OR GET ALONG WITH OTHER PEOPLE: NOT DIFFICULT AT ALL
3. WORRYING TOO MUCH ABOUT DIFFERENT THINGS: SEVERAL DAYS
5. BEING SO RESTLESS THAT IT IS HARD TO SIT STILL: NOT AT ALL
7. FEELING AFRAID AS IF SOMETHING AWFUL MIGHT HAPPEN: NOT AT ALL
6. BECOMING EASILY ANNOYED OR IRRITABLE: SEVERAL DAYS
2. NOT BEING ABLE TO STOP OR CONTROL WORRYING: NOT AT ALL

## 2022-07-27 ASSESSMENT — ENCOUNTER SYMPTOMS
MYALGIAS: 0
ABDOMINAL PAIN: 0
SHORTNESS OF BREATH: 0
JOINT SWELLING: 0
PALPITATIONS: 0
HEARTBURN: 0
WEAKNESS: 0
DIARRHEA: 0
NERVOUS/ANXIOUS: 1
DYSURIA: 0
EYE PAIN: 0
PARESTHESIAS: 0
NAUSEA: 0
HEMATOCHEZIA: 0
HEMATURIA: 0
CHILLS: 0
COUGH: 0
SORE THROAT: 0
HEADACHES: 0
CONSTIPATION: 0
FEVER: 0
FREQUENCY: 0
DIZZINESS: 0
ARTHRALGIAS: 0

## 2022-07-27 ASSESSMENT — PATIENT HEALTH QUESTIONNAIRE - PHQ9
5. POOR APPETITE OR OVEREATING: NOT AT ALL
SUM OF ALL RESPONSES TO PHQ QUESTIONS 1-9: 4

## 2022-07-27 ASSESSMENT — PAIN SCALES - GENERAL: PAINLEVEL: NO PAIN (0)

## 2022-07-27 NOTE — PROGRESS NOTES
SUBJECTIVE:   CC: Luisito Johnston is an 57 year old male who presents for preventative health visit.        Patient has been advised of split billing requirements and indicates understanding: Yes  Healthy Habits:     Getting at least 3 servings of Calcium per day:  NO    Bi-annual eye exam:  Yes    Dental care twice a year:  Yes    Sleep apnea or symptoms of sleep apnea:  Sleep apnea    Diet:  Regular (no restrictions)    Frequency of exercise:  1 day/week    Duration of exercise:  Less than 15 minutes    Taking medications regularly:  Yes    Medication side effects:  None    PHQ-2 Total Score: 2    Additional concerns today:  Yes          SUBJECTIVE:  SUBJECTIVE:  57 year old.The patient has a history of  follow-up of depressive symptoms.    Since last visit the patient has doingwell.  Notes from that visit reviewed. PHQ-9 has been reviewed.  Current symptoms include: Anxiety   Symptoms that have subjectively improved include: Anxiety  Previous and current treatment modalities employed include: Medications   Prozac (Fluoxetine)  Patient Active Problem List   Diagnosis     CARDIOVASCULAR SCREENING; LDL GOAL LESS THAN 160     Generalized anxiety disorder     Generalized anxiety disorder     ALLAN (obstructive sleep apnea)     Nocturnal hypoxemia     Advanced directives, counseling/discussion      Reviewed health maintenance  OBJECTIVE:  no apparent distress  /79   Pulse 87   Temp 97.3  F (36.3  C) (Tympanic)   Resp 18   Wt 100.2 kg (221 lb)   SpO2 100%   BMI 30.82 kg/m         MENTAL STATUS EXAM:   1. Clinical observations:Patient was clean and was adequately groomed. Patient's emotional presentation was cooperative and sima/open. Patient spoke clearly and articulately. Patient maintained adequate eye contact and was cooperative in answering questions.  2. Patient appeared to be well-oriented in all spheres with coherent, logical, goal directed and relevent thinking.  3. Thought content: Denies auditory  and visual hallucinations or delusions.  4. Affect and mood: Affect is alert and her emotional attitude is described as normal.  5. Sensorium and cognition: Patient was in contact with reality and oriented to place, time, person and situation. patient demonstrated no impairment in immediate, recent, or remote memory. patient's insight was adequate without obvious deficits in intelligence.    :         Today's PHQ-2 Score:   PHQ-2 ( 1999 Pf[employer]) 7/26/2022   Q1: Little interest or pleasure in doing things 1   Q2: Feeling down, depressed or hopeless 1   PHQ-2 Score 2   PHQ-2 Total Score (12-17 Years)- Positive if 3 or more points; Administer PHQ-A if positive -   Q1: Little interest or pleasure in doing things Several days   Q2: Feeling down, depressed or hopeless Several days   PHQ-2 Score 2       Abuse: Current or Past(Physical, Sexual or Emotional)- No  Do you feel safe in your environment? Yes        Social History     Tobacco Use     Smoking status: Never Smoker     Smokeless tobacco: Never Used   Substance Use Topics     Alcohol use: Yes     Comment: Very little         Alcohol Use 7/26/2022   Prescreen: >3 drinks/day or >7 drinks/week? Not Applicable   Prescreen: >3 drinks/day or >7 drinks/week? -       Last PSA:   PSA   Date Value Ref Range Status   01/29/2015 0.87 0 - 4 ug/L Final       Reviewed orders with patient. Reviewed health maintenance and updated orders accordingly - Yes       Reviewed and updated as needed this visit by clinical staff   Tobacco  Allergies  Meds   Med Hx  Surg Hx  Fam Hx  Soc Hx          Reviewed and updated as needed this visit by Provider                       Review of Systems   Constitutional: Negative for chills and fever.   HENT: Positive for congestion. Negative for ear pain, hearing loss and sore throat.    Eyes: Negative for pain and visual disturbance.   Respiratory: Negative for cough and shortness of breath.    Cardiovascular: Negative for chest pain, palpitations  and peripheral edema.   Gastrointestinal: Negative for abdominal pain, constipation, diarrhea, heartburn, hematochezia and nausea.   Genitourinary: Negative for dysuria, frequency, genital sores, hematuria, impotence, penile discharge and urgency.   Musculoskeletal: Negative for arthralgias, joint swelling and myalgias.   Skin: Negative for rash.   Neurological: Negative for dizziness, weakness, headaches and paresthesias.   Psychiatric/Behavioral: Positive for mood changes. The patient is nervous/anxious.      OBJECTIVE:   /79   Pulse 87   Temp 97.3  F (36.3  C) (Tympanic)   Resp 18   Wt 100.2 kg (221 lb)   SpO2 100%   BMI 30.82 kg/m      Physical Exam  GENERAL: healthy, alert and no distress  EYES: Eyes grossly normal to inspection, PERRL and conjunctivae and sclerae normal  HENT: ear canals and TM's normal, nose and mouth without ulcers or lesions  NECK: no adenopathy, no asymmetry, masses, or scars and thyroid normal to palpation  RESP: lungs clear to auscultation - no rales, rhonchi or wheezes  CV: regular rate and rhythm, normal S1 S2, no S3 or S4, no murmur, click or rub, no peripheral edema and peripheral pulses strong  ABDOMEN: soft, nontender, no hepatosplenomegaly, no masses and bowel sounds normal  MS: no gross musculoskeletal defects noted, no edema  SKIN: no suspicious lesions or rashes  NEURO: Normal strength and tone, mentation intact and speech normal  PSYCH: mentation appears normal, affect normal/bright    Diagnostic Test Results:  Labs reviewed in Epic    ASSESSMENT/PLAN:       ICD-10-CM    1. Screening for HIV (human immunodeficiency virus)  Z11.4    2. Routine general medical examination at a health care facility  Z00.00    3. Need for vaccination  Z23 ZOSTER VACCINE RECOMBINANT ADJUVANTED (SHINGRIX)   4. MELIDA (generalized anxiety disorder)  F41.1        Patient has been advised of split billing requirements and indicates understanding: Yes    COUNSELING:   Reviewed preventive health  "counseling, as reflected in patient instructions       Regular exercise       Healthy diet/nutrition    Estimated body mass index is 30.82 kg/m  as calculated from the following:    Height as of 6/7/22: 1.803 m (5' 11\").    Weight as of this encounter: 100.2 kg (221 lb).     Weight management plan: less calories    He reports that he has never smoked. He has never used smokeless tobacco.      Counseling Resources:  ATP IV Guidelines  Pooled Cohorts Equation Calculator  FRAX Risk Assessment  ICSI Preventive Guidelines  Dietary Guidelines for Americans, 2010  USDA's MyPlate  ASA Prophylaxis  Lung CA Screening    Red Trejo MD  [facility]  "

## 2022-08-02 DIAGNOSIS — F41.1 GAD (GENERALIZED ANXIETY DISORDER): ICD-10-CM

## 2022-08-10 ENCOUNTER — TRANSFERRED RECORDS (OUTPATIENT)
Dept: HEALTH INFORMATION MANAGEMENT | Facility: CLINIC | Age: 57
End: 2022-08-10

## 2022-08-23 NOTE — PROGRESS NOTES
Assessment/Plan:    Assessment & Plan   Luisito was seen today for prevention.    Diagnoses and all orders for this visit:    Low urine output  -     Patient Stated Goal: Prevent further stones    Calculus of kidney  -     CT Abdomen Pelvis w/o Contrast; Future    Stone Management Plan  Stone Management 6/9/2022 6/28/2022 8/24/2022   Urinary Tract Infection No suspicion of infection No suspicion of infection No suspicion of infection   Renal Colic Asymptomatic at this time Asymptomatic at this time Asymptomatic at this time   Renal Failure No suspicion of renal failure No suspicion of renal failure No suspicion of renal failure   Current CT date 6/7/2022 6/28/2022 -   Right sided stones? Yes Yes -   R Number of ureteral stones No ureteral stones No ureteral stones -   R Number of kidney stones  2 Renal stones unchanged from last exam -   R GSD of kidney stones 2 - 4 2 - 4 -   R Hydronephrosis None None -   R Stone Event No current event No current event No current event   R Current Plan Observe Observe Observe   Observe rationale Limited stone burden with good prognosis for spontaneous passage Limited stone burden with good prognosis for spontaneous passage Limited stone burden with good prognosis for spontaneous passage   Left sided stones? Yes Yes -   L Number of ureteral stones 1 No ureteral stones -   L GSD of ureteral stones 4 - -   L Location of ureteral stone Proximal - -   L Number of kidney stones  1 1 -   L GSD of kidney stones < 2 < 2 -   L Hydronephrosis Mild Mild -   L Stone Event New event - No current event   Diagnosis date 6/7/2022 - -   Initial location of primary symptomatic stone Proximal - -   Initial GSD of primary symptomatic stone 4 - -   L MET Status Initiation - -   L Current Plan MET - -   MET 2 week F/U - -           PLAN    55 yo pleasant M first time stone former with interval passage of obstructing left proximal ureteral stone, no specimen retrieved. Small, bilateral nonobstructing  renal stones. Initial risk workup notable for low urine volume.    Based on review of findings with the patient, he will transition to long term management and return in 12 months with repeat imaging. He will focus on increasing fluid consumption to generate at least 2,500 mL urine daily.    Video call duration: 8 minutes  12 minutes spent on the date of the encounter doing chart review, history and exam, documentation and further activities per the note    Abeba Loya PA-C  [facility]    HPI  Mr. Luisito Johnston is a 57 year old  male who is being evaluated via a billable video visit by [facility] for review of initial stone risk evaluation.     He is a first time unidentified composition stone former who has not required stone clearance procedures. He has not previously participated in stone risk evaluation. He has identified modifiable stone risks including:  low urine volume. He has no identified non-modifiable stone risk factors.     He is asymptomatic at present. He denies symptoms of fever, chills, flank pain, nausea, vomiting, urinary frequency and dysuria.     Two 24 hour urine collections demonstrate moderate low urine volume (1.8, 1 L), creatinine (1754, 1562 mg), calcium (133, 121 mg), sodium (183, 110 mmol), citrate (783, 780 mg), oxalate (37, 36 mg) and pH (5.4, 5.7). Dietary journal is not available for review.    ROS   Review of systems is negative except for HPI.    Past Medical History:   Diagnosis Date     Arthritis     Mother and Father     Chronic sinusitis     Polyposis     Depressive disorder Long time ago     Hyperlipidemia      Increased BMI      Psoriasis      Past Surgical History:   Procedure Laterality Date     ARTHROSCOPY KNEE Right 7/14/2017    Procedure: ARTHROSCOPY KNEE;  Arthroscopic Menisectomy Right Knee;  Surgeon: Jim Lemon MD;  Location: [facility] OR     COLONOSCOPY N/A 3/9/2015    Procedure:  COLONOSCOPY;  Surgeon: Denys Archuleta MD;  Location: [facility] OR     COLONOSCOPY WITH CO2 INSUFFLATION N/A 3/9/2015    Procedure: COLONOSCOPY WITH CO2 INSUFFLATION;  Surgeon: Denys Archuleta MD;  Location: [facility] OR     ENT SURGERY      polyps from nasal passage.      [facility] SHLDR ARTHROSCOP,PART ACROMIOPLAS  2000     SHOULDER SURGERY       SURGICAL HISTORY OF -   1991    Chronic Lt Shoulder Dislocation       Current Outpatient Medications   Medication Sig Dispense Refill     FLUoxetine (PROZAC) 20 MG capsule TAKE 1 CAPSULE BY MOUTH EVERY DAY 90 capsule 1       Allergies   Allergen Reactions     Aspirin Nausea and Vomiting       Social History     Socioeconomic History     Marital status:      Spouse name: Not on file     Number of children: Not on file     Years of education: Not on file     Highest education level: Not on file   Occupational History     Not on file   Tobacco Use     Smoking status: Never Smoker     Smokeless tobacco: Never Used   Vaping Use     Vaping Use: Never used   Substance and Sexual Activity     Alcohol use: Yes     Comment: Very little     Drug use: No     Sexual activity: Yes     Partners: Female     Birth control/protection: None   Other Topics Concern     Parent/sibling w/ CABG, MI or angioplasty before 65F 55M? No   Social History Narrative     Not on file     Social Determinants of Health     Financial Resource Strain: Not on file   Food Insecurity: Not on file   Transportation Needs: Not on file   Physical Activity: Not on file   Stress: Not on file   Social Connections: Not on file   Intimate Partner Violence: Not on file   Housing Stability: Not on file       Family History   Problem Relation Age of Onset     Diabetes Father      Respiratory Mother      Anxiety Disorder Sister         Both sisters       Objective:     Appears AAO x 3  No vitals obtained due to virtual visit

## 2022-08-24 ENCOUNTER — VIRTUAL VISIT (OUTPATIENT)
Dept: UROLOGY | Facility: CLINIC | Age: 57
End: 2022-08-24
Payer: COMMERCIAL

## 2022-08-24 DIAGNOSIS — N20.0 CALCULUS OF KIDNEY: ICD-10-CM

## 2022-08-24 DIAGNOSIS — R34 LOW URINE OUTPUT: Primary | ICD-10-CM

## 2022-08-24 PROCEDURE — 99213 OFFICE O/P EST LOW 20 MIN: CPT | Mod: 95 | Performed by: PHYSICIAN ASSISTANT

## 2022-08-24 ASSESSMENT — PAIN SCALES - GENERAL: PAINLEVEL: NO PAIN (0)

## 2022-08-24 NOTE — PATIENT INSTRUCTIONS
Patient Stated Goal: Prevent further stones  INCREASING FLUIDS TO DECREASE RISK    Low Urinary Volume:     Kidney stones form because there is not enough water to dissolve the concentrated chemicals and minerals in urine.     Studies have found that people who make kidney stones should drink enough fluids so that they produce at least 2 liters (2 quarts) of urine per day.     Studies have shown that virtually every fluid you might drink decreases the risk of stone formation. Acceptable fluids include milk, coffee, diet and regular sodas, alcoholic beverages, fruit juices and even plain old water.    Increasing fluid intake will increase urine volume and decrease the chance of kidney stone formation.    Fluids:    Anything liquid that fits in a glass counts.     One way to gauge if your body has enough fluids is to check the color of your urine. If the urine is dark and yellow you do not have enough fluids. Urine will be pale yellow to clear if your body has enough fluids.    What we need to survive:    Most fluid we drink is lost through evaporation, more if active in hot humid environments    Body wastes can be cleared in a small amount of urine    All fluid that is consumed in excess of necessary losses  dilutes the urine    Ways to Increase Fluids Daily:    Drink more fluids throughout the day and into the evening. (You may need to awake from sleep to urinate which is a good indication of volume status)    Keep your refrigerator stocked with beverages you like    Drink a variety of fluids - mix it up    Add another beverage to your regular beverage at every meal    Keep a beverage at your desk (work area) and finish it before you leave for breaks, meetings, lunch and end of day    Use larger volume glasses    Whenever you pass a water fountain - stop and drink    Drink a beverage with snacks, if it is a salty snack, double the beverage    Take medication with a full glass of water/fluid    Keep a bottle of  water in your car and drink every 20 miles    Eat high water content fruits (melons, oranges, grapes, etc.)    Flavor water with a squeeze of lemon or lime or Crystal Light     If you do something repetitive throughout the day, link it with having something to drink    When you give your child/children something to drink, you have something to drink also    [facility] can respond to your questions or concerns 24 hours a day at [phone].

## 2022-08-24 NOTE — PROGRESS NOTES
Patient is roomed via telephone for a virtual visit.  Patient confirmed he is in the [facility] at the time of this appointment.  Patient understands that this virtual visit is billable and agree to proceed with appointment.

## 2022-09-22 ENCOUNTER — ALLIED HEALTH/NURSE VISIT (OUTPATIENT)
Dept: FAMILY MEDICINE | Facility: CLINIC | Age: 57
End: 2022-09-22
Payer: COMMERCIAL

## 2022-09-22 DIAGNOSIS — Z23 NEED FOR VACCINATION: Primary | ICD-10-CM

## 2022-09-22 PROCEDURE — 99207 PR NO CHARGE NURSE ONLY: CPT

## 2022-09-22 PROCEDURE — 90750 HZV VACC RECOMBINANT IM: CPT

## 2022-09-22 PROCEDURE — 90471 IMMUNIZATION ADMIN: CPT

## 2022-09-22 NOTE — PROGRESS NOTES
Prior to immunization administration, verified patients identity using patient s name and date of birth. Please see Immunization Activity for additional information.     Screening Questionnaire for Adult Immunization    Are you sick today?   No   Do you have allergies to medications, food, a vaccine component or latex?   No   Have you ever had a serious reaction after receiving a vaccination?   No   Do you have a long-term health problem with heart, lung, kidney, or metabolic disease (e.g., diabetes), asthma, a blood disorder, no spleen, complement component deficiency, a cochlear implant, or a spinal fluid leak?  Are you on long-term aspirin therapy?   No   Do you have cancer, leukemia, HIV/AIDS, or any other immune system problem?   No   Do you have a parent, brother, or sister with an immune system problem?   No   In the past 3 months, have you taken medications that affect  your immune system, such as prednisone, other steroids, or anticancer drugs; drugs for the treatment of rheumatoid arthritis, Crohn s disease, or psoriasis; or have you had radiation treatments?   No   Have you had a seizure, or a brain or other nervous system problem?   No   During the past year, have you received a transfusion of blood or blood    products, or been given immune (gamma) globulin or antiviral drug?   No   For women: Are you pregnant or is there a chance you could become       pregnant during the next month?   No   Have you received any vaccinations in the past 4 weeks?   No     Immunization questionnaire answers were all negative.        Per orders of Dr. ROMERO, injection of SHINGRIX given by Dee Wheeler CMA. Patient instructed to remain in clinic for 15 minutes afterwards, and to report any adverse reaction to me immediately.       Screening performed by Dee Wheeler CMA on 9/22/2022 at 10:17 AM.

## 2022-10-09 ENCOUNTER — HEALTH MAINTENANCE LETTER (OUTPATIENT)
Age: 57
End: 2022-10-09

## 2022-10-21 ENCOUNTER — ANCILLARY PROCEDURE (OUTPATIENT)
Dept: GENERAL RADIOLOGY | Facility: CLINIC | Age: 57
End: 2022-10-21
Attending: PHYSICIAN ASSISTANT
Payer: COMMERCIAL

## 2022-10-21 ENCOUNTER — OFFICE VISIT (OUTPATIENT)
Dept: URGENT CARE | Facility: URGENT CARE | Age: 57
End: 2022-10-21
Payer: COMMERCIAL

## 2022-10-21 VITALS
TEMPERATURE: 98.1 F | DIASTOLIC BLOOD PRESSURE: 86 MMHG | WEIGHT: 225 LBS | RESPIRATION RATE: 16 BRPM | HEART RATE: 86 BPM | OXYGEN SATURATION: 97 % | BODY MASS INDEX: 31.38 KG/M2 | SYSTOLIC BLOOD PRESSURE: 128 MMHG

## 2022-10-21 DIAGNOSIS — M47.812 SPONDYLOSIS OF CERVICAL REGION WITHOUT MYELOPATHY OR RADICULOPATHY: ICD-10-CM

## 2022-10-21 DIAGNOSIS — R07.9 CHEST PAIN, UNSPECIFIED TYPE: Primary | ICD-10-CM
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
## 2022-10-21 DIAGNOSIS — S20.219A CONTUSION OF STERNUM, INITIAL ENCOUNTER: ICD-10-CM

## 2022-10-21 PROCEDURE — 71046 X-RAY EXAM CHEST 2 VIEWS: CPT | Mod: TC | Performed by: RADIOLOGY

## 2022-10-21 PROCEDURE — 99214 OFFICE O/P EST MOD 30 MIN: CPT | Performed by: PHYSICIAN ASSISTANT

## 2022-10-21 PROCEDURE — 72040 X-RAY EXAM NECK SPINE 2-3 VW: CPT | Mod: TC | Performed by: RADIOLOGY

## 2022-10-21 PROCEDURE — 93000 ELECTROCARDIOGRAM COMPLETE: CPT | Performed by: PHYSICIAN ASSISTANT

## 2022-10-21 PROCEDURE — 71120 X-RAY EXAM BREASTBONE 2/>VWS: CPT | Mod: TC | Performed by: RADIOLOGY

## 2022-10-21 RX ORDER — IBUPROFEN 800 MG/1
800 TABLET, FILM COATED ORAL EVERY 8 HOURS PRN
Qty: 40 TABLET | Refills: 0 | Status: SHIPPED | OUTPATIENT
Start: 2022-10-21 | End: 2023-07-31

## 2022-10-21 NOTE — PROGRESS NOTES
SUBJECTIVE:   Luisito Johnston is a 57 year old male presenting with a chief complaint of   Chief Complaint   Patient presents with     Urgent Care     Fall     Per pt states he is the kind of person who waits until something is worst states on 10/08/2022 he fell from a trailer hit the rail and fell backwards hit his head and neck, states his head, neck and torso hurt. States now when he breaths or coughs he feels a sharp pain, and SOB        He is an established patient of [place].  Patient presents with posterior neck, sternal pain, SOB and HA.  Patient fell on 10/8.  Patient feel about 3.5 feet off of a trailer backwards onto head and neck.  No LOC, was nauseated, but no vomiting.  No known neck problems.  Directly after fall, immediate pain.  Following the fall, patient finished his trailer work and drove home.  He felt he could not drive due to pain.  No hx of PE/DVT.  Hx of concussion in 7/2020.  Pain was 7-8 and pain is now 3/10.  Patient came in due to SOB.  States neck pain, specific area is now consistent, instead of intermittent.  No problems using arms or legs.      Treatment: ibuprofen - last night was last.  2 BID.  No ice.          Review of Systems    Past Medical History:   Diagnosis Date     Arthritis     Mother and Father     Chronic sinusitis     Polyposis     Depressive disorder Long time ago     Hyperlipidemia      Increased BMI      Psoriasis      Family History   Problem Relation Age of Onset     Diabetes Father      Respiratory Mother      Anxiety Disorder Sister         Both sisters     Current Outpatient Medications   Medication Sig Dispense Refill     FLUoxetine (PROZAC) 20 MG capsule TAKE 1 CAPSULE BY MOUTH EVERY DAY 90 capsule 1     ibuprofen (ADVIL/MOTRIN) 800 MG tablet Take 1 tablet (800 mg) by mouth every 8 hours as needed for moderate pain 40 tablet 0     tiZANidine (ZANAFLEX) 4 MG tablet Take 1 tablet (4 mg) by mouth 3 times daily 25 tablet 0     Social History     Tobacco Use      Smoking status: Never     Smokeless tobacco: Never   Substance Use Topics     Alcohol use: Yes     Comment: Very little       OBJECTIVE  /86   Pulse 86   Temp 98.1  F (36.7  C) (Tympanic)   Resp 16   Wt 102.1 kg (225 lb)   SpO2 97%   BMI 31.38 kg/m      Physical Exam  Vitals and nursing note reviewed.   Constitutional:       General: He is not in acute distress.     Appearance: Normal appearance. He is obese. He is not ill-appearing, toxic-appearing or diaphoretic.      Comments: Patient is able to talk in complete sentences and  Does not appear to have any breathing problems.   Eyes:      Extraocular Movements: Extraocular movements intact.      Conjunctiva/sclera: Conjunctivae normal.   Cardiovascular:      Rate and Rhythm: Normal rate and regular rhythm.      Pulses: Normal pulses.      Heart sounds: Normal heart sounds.   Pulmonary:      Effort: Pulmonary effort is normal.      Breath sounds: Normal breath sounds.      Comments: Tenderness to palpation of distal sternum.  No eccymosis.  Chest:      Chest wall: Tenderness present.   Musculoskeletal:      Cervical back: Normal range of motion. No rigidity.      Comments: Full ROM. Some pain with extension.  Normal shoulder exam.  Tenderness to palpation of bony spine C5-6 area.  UE strength normal.  +radial pulse.      Skin:     General: Skin is warm and dry.   Neurological:      Mental Status: He is alert.   Psychiatric:         Mood and Affect: Mood normal.         Labs:  Results for orders placed or performed in visit on 10/21/22 (from the past 24 hour(s))   XR Sternum 2 Views    Narrative    Examination:  XR STERNUM 2 VIEWS    Date:  10/21/2022 1:50 PM     Clinical Information: Fall, initial encounter     Additional Information: none    Comparison: none        Impression    Impression:    1. No sternal fractures are identified. The SI joints appear  symmetric..    CHI PEPPER MD         SYSTEM ID:  [id number]   XR Chest 2 Views    Narrative     CHEST TWO VIEWS   10/21/2022 1:50 PM     HISTORY: Fall/pain.    COMPARISON: Chest x-ray on 6/17/2017.      Impression    IMPRESSION: PA and lateral views of the chest were obtained.  Cardiomediastinal silhouette is within normal limits. No suspicious  focal pulmonary opacities. No significant pleural effusion or  pneumothorax. No suspicious acute osseous pathology. If clinical  suspicion of rib fractures, remains high, rib series is more sensitive  for detection of subtle rib fractures.   XR Cervical Spine 2/3 Views    Narrative    CERVICAL SPINE TWO TO THREE VIEWS   10/21/2022 1:50 PM     HISTORY: Fall, initial encounter    COMPARISON: None.      Impression    IMPRESSION: No grossly displaced cervical spine fracture is  appreciated by x-ray. Cervical spine CT is recommended in the setting  of cervical spine trauma for which imaging is indicated. Multilevel  moderate to severe degenerative disc disease. Multilevel  mild-to-moderate facet arthropathy. Soft tissues within normal limits.    OPAL NGO MD         SYSTEM ID:  [id number]       X-Ray was done, my findings are:      ASSESSMENT:      ICD-10-CM    1. Chest pain, unspecified type  R07.9 EKG 12-lead complete w/read - Clinics      2. Fall, initial encounter  W19.XXXA XR Sternum 2 Views     XR Chest 2 Views     XR Cervical Spine 2/3 Views      3. Spondylosis of cervical region without myelopathy or radiculopathy  M47.812       4. Contusion of sternum, initial encounter  S20.219A tiZANidine (ZANAFLEX) 4 MG tablet     ibuprofen (ADVIL/MOTRIN) 800 MG tablet           Medical Decision Making:    Differential Diagnosis:  MS Injury Pain: fracture, muscle strain and contusion    Serious Comorbid Conditions:  Adult:  reviewed    PLAN:    Rx for ibuprofen and zanaflex.  May take tylenol.  Patient declined PT.  Discussed expected course and reasons to seek immediate medical attention.  Drink plenty of water.  Discussed SE of muscle relaxers as well as the importance of  slow deep breathes.      Followup:    If not improving or if condition worsens, follow up with your Primary Care Provider, If not improving or if conditions worsens over the next 12-24 hours, go to the Emergency Department    There are no Patient Instructions on file for this visit.

## 2023-02-09 DIAGNOSIS — F41.1 GAD (GENERALIZED ANXIETY DISORDER): ICD-10-CM

## 2023-03-06 ENCOUNTER — MYC MEDICAL ADVICE (OUTPATIENT)
Dept: FAMILY MEDICINE | Facility: CLINIC | Age: 58
End: 2023-03-06
Payer: COMMERCIAL

## 2023-03-06 DIAGNOSIS — F41.1 GAD (GENERALIZED ANXIETY DISORDER): ICD-10-CM

## 2023-04-28 ENCOUNTER — OFFICE VISIT (OUTPATIENT)
Dept: FAMILY MEDICINE | Facility: CLINIC | Age: 58
End: 2023-04-28
Payer: COMMERCIAL

## 2023-04-28 ENCOUNTER — ANCILLARY PROCEDURE (OUTPATIENT)
Dept: GENERAL RADIOLOGY | Facility: CLINIC | Age: 58
End: 2023-04-28
Attending: PHYSICIAN ASSISTANT
Payer: COMMERCIAL

## 2023-04-28 VITALS
DIASTOLIC BLOOD PRESSURE: 86 MMHG | OXYGEN SATURATION: 96 % | HEART RATE: 83 BPM | SYSTOLIC BLOOD PRESSURE: 131 MMHG | TEMPERATURE: 97.8 F | BODY MASS INDEX: 32.96 KG/M2 | HEIGHT: 71 IN | WEIGHT: 235.4 LBS | RESPIRATION RATE: 16 BRPM

## 2023-04-28 DIAGNOSIS — M25.511 BILATERAL SHOULDER PAIN, UNSPECIFIED CHRONICITY: ICD-10-CM

## 2023-04-28 DIAGNOSIS — M25.511 BILATERAL SHOULDER PAIN, UNSPECIFIED CHRONICITY: Primary | ICD-10-CM

## 2023-04-28 DIAGNOSIS — M25.512 BILATERAL SHOULDER PAIN, UNSPECIFIED CHRONICITY: Primary | ICD-10-CM

## 2023-04-28 DIAGNOSIS — M25.512 BILATERAL SHOULDER PAIN, UNSPECIFIED CHRONICITY: ICD-10-CM

## 2023-04-28 DIAGNOSIS — S86.011A STRAIN OF RIGHT ACHILLES TENDON, INITIAL ENCOUNTER: ICD-10-CM

## 2023-04-28 PROCEDURE — 99213 OFFICE O/P EST LOW 20 MIN: CPT | Performed by: PHYSICIAN ASSISTANT

## 2023-04-28 PROCEDURE — 73030 X-RAY EXAM OF SHOULDER: CPT | Mod: TC | Performed by: RADIOLOGY

## 2023-04-28 ASSESSMENT — PAIN SCALES - GENERAL: PAINLEVEL: SEVERE PAIN (7)

## 2023-04-28 NOTE — PROGRESS NOTES
Assessment & Plan     (M25.511,  M25.512) Bilateral shoulder pain, unspecified chronicity  (primary encounter diagnosis)  Comment: History of bilateral shoulder pain.  This is been going on for months.  Multiple prior surgeries to each shoulder.  Intact distal CMS.   strength 5/5 and symmetric.  There is no midline neck pain.  Discussed with patient x-ray imaging is following up with orthopedics given the longevity of his symptoms.  Patient was amenable to this.  Discussed using Tylenol and ibuprofen as needed for pain control.    Plan: XR Shoulder Right G/E 3 Views, XR Shoulder Left        G/E 3 Views, Orthopedic  Referral          (S86.011A) Strain of right Achilles tendon, initial encounter  Comment: Patient with history of right Achilles discomfort.  Considered broad different diagnosis including DVT, tendinopathy, muscle strain, amongst others.  Given the location of the patient discomfort and swelling over the Achilles tendon appears most consistent with tendinopathy.  Discussed the patient using heel lifts as well as potential for MRI given the swelling he is having at this site.  Discussed orthopedic referral.  Patient was offered cam walking boot but declined.  Plan: MR Ankle Right w/o Contrast, Orthopedic          Referral            [facility]   Pat is a 57 year old, presenting for the following health issues:  Shoulder Pain (Hx of surgery in both shoulders. Right shoulder pain is radiating down his arm and causing it to be numb. He's thing about getting a cortisone shot in his right shoulder. Left shoulder has tingling and star burst that causes to itch and radiate to his neck. ), Foot Pain (Pain in both feet. ), and Tendonitis (Right Achilles pain. )         View : No data to display.              Shoulder Pain    History of Present Illness       Reason for visit:  Shoulder pain  Symptom onset:  More than a  "month  Symptoms include:  Pain  tingling  Symptom intensity:  Moderate  Symptom progression:  Staying the same  Had these symptoms before:  Yes  Has tried/received treatment for these symptoms:  Yes  Previous treatment was successful:  No    He eats 0-1 servings of fruits and vegetables daily.He consumes 2 sweetened beverage(s) daily.He exercises with enough effort to increase his heart rate 9 or less minutes per day.  He exercises with enough effort to increase his heart rate 3 or less days per week. He is missing 1 dose(s) of medications per week.  He is not taking prescribed medications regularly due to remembering to take.     Patient presents for evaluation of multiple complaints.  He has had bilateral shoulder surgeries.  Patient is right-hand dominant.  Has been having months of worsening right shoulder pain as well as 6 months of worsening left shoulder pain.  There is no trauma or injury coinciding with onset of symptoms.  There is no neck pain associated with this.  Right shoulder pain patient has increased discomfort with internal rotation.  Denies any radiation of discomfort.  Patient indicates left shoulder pain over the acromioclavicular joint.  Describes a radiation of discomfort from the site that goes over the deltoid and towards the neck.  This pain is worse when he attempts to brace the arm especially on elevated countertops.    Further, over the last few months increasing discomfort over the right Achilles.  In the last 3 weeks has noticed some swelling over the Achilles tendon.  Denies any calf pain.  There is no trauma or injury to the heel.  Denies any discomfort of the calcaneus.       Review of Systems   Constitutional, HEENT, cardiovascular, pulmonary, gi and gu systems are negative, except as otherwise noted.      Objective    /86   Pulse 83   Temp 97.8  F (36.6  C) (Tympanic)   Resp 16   Ht 1.803 m (5' 11\")   Wt 106.8 kg (235 lb 6.4 oz)   SpO2 96%   BMI 32.83 kg/m    Body " mass index is 32.83 kg/m .  Physical Exam   GENERAL: healthy, alert and no distress  RESP: lungs clear to auscultation - no rales, rhonchi or wheezes  CV: regular rate and rhythm, normal S1 S2, no S3 or S4, no murmur, click or rub, no peripheral edema and peripheral pulses strong  MS: No midline CTL tenderness.  No paraspinal musculature tenderness.  Right shoulder positive Antonio.  Left shoulder tenderness over the acromioclavicular joint.  Negative Antonio on the left.  Tenderness and swelling over the right Achilles.  No calf swelling or pain.  No tenderness at the insertion point over the calcaneus.  NEURO:  strength 5/5 and symmetric.  Intact median, ulnar, radial nerve function.  Normal plantar and dorsiflexion.  PSYCH: mentation appears normal, affect normal/bright    Results for orders placed or performed in visit on 04/28/23   XR Shoulder Left G/E 3 Views     Status: None    Narrative    XR SHOULDER LEFT G/E 3 VIEWS   4/28/2023 9:49 AM     HISTORY: Bilateral shoulder pain, unspecified chronicity; Bilateral  shoulder pain, unspecified chronicity  COMPARISON: None.     LEFT SHOULDER     Impression    IMPRESSION: No acute fracture or dislocation. Some sclerosis in the  posterolateral humeral head may relate to a chronic Hill-Sachs lesion.  There is a corticated ossicle along the superior aspect of the left  acromioclavicular joint which likely relates to remote injury.    REBECCA SILVER MD         SYSTEM ID:  [id number]   Results for orders placed or performed in visit on 04/28/23   XR Shoulder Right G/E 3 Views     Status: None    Narrative    XR SHOULDER RIGHT G/E 3 VIEWS   4/28/2023 9:49 AM     HISTORY: Right shoulder pain, prior surgical repair; Bilateral  shoulder pain, unspecified chronicity; Bilateral shoulder pain,  unspecified chronicity  COMPARISON: None.     RIGHT SHOULDER     Impression    IMPRESSION: No acute fracture or dislocation. There are suspected  postoperative changes in the  humeral head. There is  mild to moderate  acromioclavicular degenerative arthrosis. There is also mild  glenohumeral degenerative arthrosis with a small ossific density along  the inferior humeral head on the internal rotation view which may  represent an osteophyte or small intra-articular loose body.    REBECCA SILVER MD         SYSTEM ID:  [id number]

## 2023-04-28 NOTE — PATIENT INSTRUCTIONS
Tylenol 1000 mg every 8 hours. No more than 4000 mg per day.     Ibuprofen 600 mg every 8 hours. No more than 3200 per day.

## 2023-05-08 NOTE — PROGRESS NOTES
ASSESSMENT & PLAN    Luisito was seen today for pain and pain.    Diagnoses and all orders for this visit:    Cervicalgia    Chronic pain of both shoulders  -     Orthopedic  Referral      With focal pain about the shoulders, we discussed that it is possible source; underlying arthrosis is certainly a consideration, though overall function on exam today appears good.  We also discussed cervical spine source for at least some symptoms, given what was noted on exam, as well as previous x-rays.  With the more focal pain around the shoulders, noting arthrosis previously, plan to start with focus on the shoulders.  He is interested in injection, we will set him up for this.  If not getting desired relief from that, then considerations include PT, additional imaging (MRI shoulders, versus MRI C-spine).    **  He also has upcoming MRI of the right ankle, so we deferred any discussion of this for today.    Questions answered. Discussed signs and symptoms that may indicate more serious issues; the patient was instructed to seek appropriate care if noted. Pat indicates understanding of these issues and agrees with the plan.      See Patient Instructions  Patient Instructions   Discussed nature of the shoulder area pain.  Recent x-rays do demonstrate some underlying degenerative change, and interesting to note history that you have had with her shoulders, including dislocations and prior surgeries.  Additionally, interesting to note the history advised by Dr. Lemon that there is some arthritic change in the shoulder noted at the time of surgery.  We also discussed potential cervical spine component, given the x-rays from last fall demonstrating degenerative change.  For next steps, we discussed considerations around additional imaging of the cervical spine, additional imaging of the shoulders; we also discussed potential for physical therapy, and injection options.  Following discussion, plan imaging guided bilateral  glenohumeral joint steroid injections, with one of my colleagues.  From there, plan to monitor closely over additional 2 weeks.  If significant symptom relief, this confirms that the shoulders are the source, and plan to continue with monitoring.  What ever symptoms are left over we can reassess, and consider additional intervention or work-up.    If you have any further questions for your physician or physician s care team you can contact them thru MyChart or by calling  [phone] and use option 3 to leave a voice message.   Messages received during business hours will be returned same day.          Robert Bullock [facility]      CC: Derrick Morales      -----  Chief Complaint   Patient presents with     Left Shoulder - Pain     Right Shoulder - Pain       SUBJECTIVE  Luisito Johnston is a/an 57 year old male who is seen in consultation at the request of  Derrick Morales PA-C for evaluation of Bilateral shoulder pain and right achilles strain.     The patient is seen by themselves.  The patient is Right handed    Onset: Right- years, increasing in pain, Left 4-5 months   Location of Pain: right - AC joint (arthritis) & posterior shoulder,    Left- anterior shoulder radiating up to ear  Worsened by:  right- sleeping (pillow for propping) left- bearing weight in forearms on counter  Better with: right sleeping with arms overhead, heat, left- nothing  Treatments tried: rest/activity avoidance, ice, heat ibuprofen, Aleve and hot tub, surgery  Associated symptoms: right- numbness into elbow & wrist  Left- tingling in anterior shoulder radiating up into ear for 10-15 sec multi per hour    Orthopedic/Surgical history: YES - Date: 1990 & 1997, 2010 Bilateral shoulders  Social History/Occupation: sales, gardening/ landscaping    **  Left shoulder: notes pain anteriorly, and then expands from there; gets some tingling. Was more localized, and now can radiate to  left lateral neck. That radiating sensation lasts ~10-15 seconds, then resolves. No radiating symptoms to left UE.    **  Also has history of multiple shoulder dislocations in past.    Right shoulder op note from 3/17/00 reviewed; was scanned on 4/23/10. Arthroscopic acromioplasty with debridement and thermal shrinkage of labral and anterior inferior capsular tissues. Dr Medley.    Review of chart also indicates rotator cuff repair in follow up note from Dr Lemon 5/11/10.    **  Note scanned in chart from 3/19/08 indicates follow-up for left shoulder dislocation.        REVIEW OF SYSTEMS:  Review of Systems        OBJECTIVE:  /80   Wt 106.6 kg (235 lb)   BMI 32.78 kg/m           Cervical Spine Exam    Range of Motion:       forward flexion no change         extension mild-mod limitation with left side pulling sensation; also noted some discomfort radiating to left shoulder area        lateral flexion to right with some right shoulder area pain; to left no change    Inspection:       Head forward posture    Tender:      paraspinal muscles       upper border of trapezius       bilat  Periscapular area    Strength: UE grossly intact, symmetric    Special Tests:     equivocal Spurling; notes posterior right shoulder pain with motion to left, and some left shoulder area pain with motion to right    Skin:     well perfused       capillary refill brisk    Lymphatics:      no edema noted in the upper extremities               Bilateral Shoulder exam    ROM:      forward flexion grossly symmetric, ~160, no change in pain        abduction grossly symmetric, ~160, no change in pain       internal rotation left thumb to mid thoracic; right 2-3 vertebral segments lower; some pulling sensation on right       external rotation grossly symmetric, some right posterior shoulder area pulling    Tender:      posterior shoulder right    Strength:      abduction 5/5       internal rotation 5/5       external rotation 5-/5  No  significant change in pain    Impingement testing:      neg (-) Antonio left; on right, noted pop sensation with testing, otherwise no change       neg (-) empty can    Skin:      no visible deformities       well perfused       capillary refill brisk    Sensation:      normal sensation over shoulder and upper extremity               RADIOLOGY:  I independently visualized and reviewed these images with the patient  Appearence of post-surgical change bilaterally. AC Arthrosis bilaterally, with chronic appearing ossicle on left. On right, some GH arthrosis present with inferior joint spurring noted.  Appearance of Hill-Sachs lesion.          XR SHOULDER RIGHT G/E 3 VIEWS   4/28/2023 9:49 AM      HISTORY: Right shoulder pain, prior surgical repair; Bilateral  shoulder pain, unspecified chronicity; Bilateral shoulder pain,  unspecified chronicity  COMPARISON: None.      RIGHT SHOULDER                                                                    IMPRESSION: No acute fracture or dislocation. There are suspected  postoperative changes in the humeral head. There is  mild to moderate  acromioclavicular degenerative arthrosis. There is also mild  glenohumeral degenerative arthrosis with a small ossific density along  the inferior humeral head on the internal rotation view which may  represent an osteophyte or small intra-articular loose body.             XR SHOULDER LEFT G/E 3 VIEWS      HISTORY: Bilateral shoulder pain, unspecified chronicity; Bilateral  shoulder pain, unspecified chronicity  COMPARISON: None.      LEFT SHOULDER                                                                    IMPRESSION: No acute fracture or dislocation. Some sclerosis in the  posterolateral humeral head may relate to a chronic Hill-Sachs lesion.  There is a corticated ossicle along the superior aspect of the left  acromioclavicular joint which likely relates to remote injury.     REBECCA SILVER MD          ========================  Multilevel degenerative change, DDD and facet arthrosis.        CERVICAL SPINE TWO TO THREE VIEWS 10/21/2022 1:50 PM     HISTORY: Fall, initial encounter    COMPARISON: None.    IMPRESSION: No grossly displaced cervical spine fracture is  appreciated by x-ray. Cervical spine CT is recommended in the setting  of cervical spine trauma for which imaging is indicated. Multilevel  moderate to severe degenerative disc disease. Multilevel  mild-to-moderate facet arthropathy. Soft tissues within normal limits.    OPAL NGO MD

## 2023-05-10 ENCOUNTER — OFFICE VISIT (OUTPATIENT)
Dept: ORTHOPEDICS | Facility: CLINIC | Age: 58
End: 2023-05-10
Payer: COMMERCIAL

## 2023-05-10 ENCOUNTER — OFFICE VISIT (OUTPATIENT)
Dept: ORTHOPEDICS | Facility: CLINIC | Age: 58
End: 2023-05-10
Attending: PHYSICIAN ASSISTANT
Payer: COMMERCIAL

## 2023-05-10 VITALS — DIASTOLIC BLOOD PRESSURE: 80 MMHG | BODY MASS INDEX: 32.78 KG/M2 | WEIGHT: 235 LBS | SYSTOLIC BLOOD PRESSURE: 130 MMHG

## 2023-05-10 VITALS — WEIGHT: 235 LBS | BODY MASS INDEX: 32.9 KG/M2 | HEIGHT: 71 IN

## 2023-05-10 DIAGNOSIS — M25.512 CHRONIC PAIN OF BOTH SHOULDERS: Primary | ICD-10-CM

## 2023-05-10 DIAGNOSIS — G89.29 CHRONIC PAIN OF BOTH SHOULDERS: ICD-10-CM

## 2023-05-10 DIAGNOSIS — M25.512 CHRONIC PAIN OF BOTH SHOULDERS: ICD-10-CM

## 2023-05-10 DIAGNOSIS — M54.2 CERVICALGIA: Primary | ICD-10-CM

## 2023-05-10 DIAGNOSIS — G89.29 CHRONIC PAIN OF BOTH SHOULDERS: Primary | ICD-10-CM

## 2023-05-10 DIAGNOSIS — M25.511 CHRONIC PAIN OF BOTH SHOULDERS: ICD-10-CM

## 2023-05-10 DIAGNOSIS — M25.511 CHRONIC PAIN OF BOTH SHOULDERS: Primary | ICD-10-CM

## 2023-05-10 PROCEDURE — 99204 OFFICE O/P NEW MOD 45 MIN: CPT | Performed by: PEDIATRICS

## 2023-05-10 PROCEDURE — 20611 DRAIN/INJ JOINT/BURSA W/US: CPT | Mod: 50 | Performed by: FAMILY MEDICINE

## 2023-05-10 RX ORDER — TRIAMCINOLONE ACETONIDE 40 MG/ML
40 INJECTION, SUSPENSION INTRA-ARTICULAR; INTRAMUSCULAR
Status: SHIPPED | OUTPATIENT
Start: 2023-05-10

## 2023-05-10 RX ORDER — ROPIVACAINE HYDROCHLORIDE 5 MG/ML
3 INJECTION, SOLUTION EPIDURAL; INFILTRATION; PERINEURAL
Status: SHIPPED | OUTPATIENT
Start: 2023-05-10

## 2023-05-10 RX ADMIN — ROPIVACAINE HYDROCHLORIDE 3 ML: 5 INJECTION, SOLUTION EPIDURAL; INFILTRATION; PERINEURAL at 16:45

## 2023-05-10 RX ADMIN — TRIAMCINOLONE ACETONIDE 40 MG: 40 INJECTION, SUSPENSION INTRA-ARTICULAR; INTRAMUSCULAR at 16:45

## 2023-05-10 ASSESSMENT — PAIN SCALES - GENERAL: PAINLEVEL: MILD PAIN (3)

## 2023-05-10 NOTE — PROGRESS NOTES
Luisito Johnston  :  1965  DOS: 5/10/2023  MRN: [id number]    Sports Medicine Clinic Procedure    Ultrasound Guided Bilateral Shoulder Intra-articular Glenohumeral Joint Injection    Clinical History: Gradual onset of radiating bilateral superior posterior shoulder, glenohumeral joint pain over the past 4.5 months.  Only mild improvement with OTC pain medications and heat.    Diagnosis:   1. Chronic pain of both shoulders      Referring Physician: Robert Bullock DO    Large Joint Injection/Arthocentesis: bilateral glenohumeral    Date/Time: 5/10/2023 4:45 PM    Performed by: Robert Alvarenga DO  Authorized by: Robert Alvarenga DO    Indications:  Pain and osteoarthritis  Needle Size:  21 G  Guidance: ultrasound    Approach:  Posterolateral  Location:  Shoulder  Laterality:  Bilateral      Site:  Bilateral glenohumeral  Medications (Right):  40 mg triamcinolone 40 MG/ML; 3 mL ropivacaine 5 MG/ML  Medications (Left):  40 mg triamcinolone 40 MG/ML; 3 mL ropivacaine 5 MG/ML  Outcome:  Tolerated well, no immediate complications  Procedure discussed: discussed risks, benefits, and alternatives    Consent Given by:  Patient  Timeout: timeout called immediately prior to procedure    Prep: patient was prepped and draped in usual sterile fashion     Ultrasound images of procedure were permanently stored.         Impression:  Successful Bilateral intra-articular shoulder injection, glenohumeral    Plan:  Follow up with Dr Bullock as directed   Expectations and goals of CSI reviewed  Often 2-3 days for steroid effect, and can take up to two weeks for maximum effect  We discussed modified progressive pain-free activity as tolerated  Do not overuse in first two weeks if feeling better due to concern for vulnerability while steroid is working  Supportive care reviewed  All questions were answered today  Contact us with additional questions or concerns  Signs and sx of concern reviewed      Robert Alvarenga  DO, CAQ  Primary Care Sports Medicine  [facility]

## 2023-05-10 NOTE — LETTER
5/10/2023         RE: Luisito Johnston  [address] [facility] [address]        Dear Colleague,    Thank you for referring your patient, Luisito Johnston, to the [facility]. Please see a copy of my visit note below.    Luisito Johnston  :  1965  DOS: 5/10/2023  MRN: [id number]    Sports Medicine Clinic Procedure    Ultrasound Guided Bilateral Shoulder Intra-articular Glenohumeral Joint Injection    Clinical History: Gradual onset of radiating bilateral superior posterior shoulder, glenohumeral joint pain over the past 4.5 months.  Only mild improvement with OTC pain medications and heat.    Diagnosis:   1. Chronic pain of both shoulders      Referring Physician: Robert Bullock DO    Large Joint Injection/Arthocentesis: bilateral glenohumeral    Date/Time: 5/10/2023 4:45 PM    Performed by: Robert Alvarenga DO  Authorized by: Robert Alvarenga DO    Indications:  Pain and osteoarthritis  Needle Size:  21 G  Guidance: ultrasound    Approach:  Posterolateral  Location:  Shoulder  Laterality:  Bilateral      Site:  Bilateral glenohumeral  Medications (Right):  40 mg triamcinolone 40 MG/ML; 3 mL ropivacaine 5 MG/ML  Medications (Left):  40 mg triamcinolone 40 MG/ML; 3 mL ropivacaine 5 MG/ML  Outcome:  Tolerated well, no immediate complications  Procedure discussed: discussed risks, benefits, and alternatives    Consent Given by:  Patient  Timeout: timeout called immediately prior to procedure    Prep: patient was prepped and draped in usual sterile fashion     Ultrasound images of procedure were permanently stored.         Impression:  Successful Bilateral intra-articular shoulder injection, glenohumeral    Plan:  Follow up with Dr Bullock as directed   Expectations and goals of CSI reviewed  Often 2-3 days for steroid effect, and can take up to two weeks for maximum effect  We discussed modified progressive pain-free activity as tolerated  Do not  overuse in first two weeks if feeling better due to concern for vulnerability while steroid is working  Supportive care reviewed  All questions were answered today  Contact us with additional questions or concerns  Signs and sx of concern reviewed      Robert Alvarenga DO, CAQ  Primary Care Sports Medicine  [facility]         Again, thank you for allowing me to participate in the care of your patient.        Sincerely,        Robert Alvarenga DO

## 2023-05-10 NOTE — PATIENT INSTRUCTIONS
Discussed nature of the shoulder area pain.  Recent x-rays do demonstrate some underlying degenerative change, and interesting to note history that you have had with her shoulders, including dislocations and prior surgeries.  Additionally, interesting to note the history advised by Dr. Lemon that there is some arthritic change in the shoulder noted at the time of surgery.  We also discussed potential cervical spine component, given the x-rays from last fall demonstrating degenerative change.  For next steps, we discussed considerations around additional imaging of the cervical spine, additional imaging of the shoulders; we also discussed potential for physical therapy, and injection options.  Following discussion, plan imaging guided bilateral glenohumeral joint steroid injections, with one of my colleagues.  From there, plan to monitor closely over additional 2 weeks.  If significant symptom relief, this confirms that the shoulders are the source, and plan to continue with monitoring.  What ever symptoms are left over we can reassess, and consider additional intervention or work-up.    If you have any further questions for your physician or physician s care team you can contact them thru [employer] or by calling  [phone] and use option 3 to leave a voice message.   Messages received during business hours will be returned same day.

## 2023-05-10 NOTE — LETTER
5/10/2023         RE: Luisito Johnston  [address]  [facility] [address]        Dear Colleague,    Thank you for referring your patient, Luisito Johnston, to the [facility]. Please see a copy of my visit note below.    ASSESSMENT & PLAN    Luisito was seen today for pain and pain.    Diagnoses and all orders for this visit:    Cervicalgia    Chronic pain of both shoulders  -     Orthopedic  Referral      With focal pain about the shoulders, we discussed that it is possible source; underlying arthrosis is certainly a consideration, though overall function on exam today appears good.  We also discussed cervical spine source for at least some symptoms, given what was noted on exam, as well as previous x-rays.  With the more focal pain around the shoulders, noting arthrosis previously, plan to start with focus on the shoulders.  He is interested in injection, we will set him up for this.  If not getting desired relief from that, then considerations include PT, additional imaging (MRI shoulders, versus MRI C-spine).    **  He also has upcoming MRI of the right ankle, so we deferred any discussion of this for today.    Questions answered. Discussed signs and symptoms that may indicate more serious issues; the patient was instructed to seek appropriate care if noted. Pat indicates understanding of these issues and agrees with the plan.      See Patient Instructions  Patient Instructions   Discussed nature of the shoulder area pain.  Recent x-rays do demonstrate some underlying degenerative change, and interesting to note history that you have had with her shoulders, including dislocations and prior surgeries.  Additionally, interesting to note the history advised by Dr. Lemon that there is some arthritic change in the shoulder noted at the time of surgery.  We also discussed potential cervical spine component, given the x-rays from last fall demonstrating  degenerative change.  For next steps, we discussed considerations around additional imaging of the cervical spine, additional imaging of the shoulders; we also discussed potential for physical therapy, and injection options.  Following discussion, plan imaging guided bilateral glenohumeral joint steroid injections, with one of my colleagues.  From there, plan to monitor closely over additional 2 weeks.  If significant symptom relief, this confirms that the shoulders are the source, and plan to continue with monitoring.  What ever symptoms are left over we can reassess, and consider additional intervention or work-up.    If you have any further questions for your physician or physician s care team you can contact them thru [employer] or by calling  [phone] and use option 3 to leave a voice message.   Messages received during business hours will be returned same day.          Robert Bullock [facility]      CC: Derrick Morales      -----  Chief Complaint   Patient presents with     Left Shoulder - Pain     Right Shoulder - Pain       SUBJECTIVE  Luisito Johnston is a/an 57 year old male who is seen in consultation at the request of  Derrick Morales PA-C for evaluation of Bilateral shoulder pain and right achilles strain.     The patient is seen by themselves.  The patient is Right handed    Onset: Right- years, increasing in pain, Left 4-5 months   Location of Pain: right - AC joint (arthritis) & posterior shoulder,    Left- anterior shoulder radiating up to ear  Worsened by:  right- sleeping (pillow for propping) left- bearing weight in forearms on counter  Better with: right sleeping with arms overhead, heat, left- nothing  Treatments tried: rest/activity avoidance, ice, heat ibuprofen, Aleve and hot tub, surgery  Associated symptoms: right- numbness into elbow & wrist  Left- tingling in anterior shoulder radiating up into ear for 10-15 sec multi per  hour    Orthopedic/Surgical history: YES - Date: 1990 & 1997, 2010 Bilateral shoulders  Social History/Occupation: sales, gardening/ landscaping    **  Left shoulder: notes pain anteriorly, and then expands from there; gets some tingling. Was more localized, and now can radiate to left lateral neck. That radiating sensation lasts ~10-15 seconds, then resolves. No radiating symptoms to left UE.    **  Also has history of multiple shoulder dislocations in past.    Right shoulder op note from 3/17/00 reviewed; was scanned on 4/23/10. Arthroscopic acromioplasty with debridement and thermal shrinkage of labral and anterior inferior capsular tissues. Dr Medley.    Review of chart also indicates rotator cuff repair in follow up note from Dr Lemon 5/11/10.    **  Note scanned in chart from 3/19/08 indicates follow-up for left shoulder dislocation.        REVIEW OF SYSTEMS:  Review of Systems        OBJECTIVE:  /80   Wt 106.6 kg (235 lb)   BMI 32.78 kg/m           Cervical Spine Exam    Range of Motion:       forward flexion no change         extension mild-mod limitation with left side pulling sensation; also noted some discomfort radiating to left shoulder area        lateral flexion to right with some right shoulder area pain; to left no change    Inspection:       Head forward posture    Tender:      paraspinal muscles       upper border of trapezius       bilat  Periscapular area    Strength: UE grossly intact, symmetric    Special Tests:     equivocal Spurling; notes posterior right shoulder pain with motion to left, and some left shoulder area pain with motion to right    Skin:     well perfused       capillary refill brisk    Lymphatics:      no edema noted in the upper extremities               Bilateral Shoulder exam    ROM:      forward flexion grossly symmetric, ~160, no change in pain        abduction grossly symmetric, ~160, no change in pain       internal rotation left thumb to mid thoracic; right 2-3  vertebral segments lower; some pulling sensation on right       external rotation grossly symmetric, some right posterior shoulder area pulling    Tender:      posterior shoulder right    Strength:      abduction 5/5       internal rotation 5/5       external rotation 5-/5  No significant change in pain    Impingement testing:      neg (-) Antonio left; on right, noted pop sensation with testing, otherwise no change       neg (-) empty can    Skin:      no visible deformities       well perfused       capillary refill brisk    Sensation:      normal sensation over shoulder and upper extremity               RADIOLOGY:  I independently visualized and reviewed these images with the patient  Appearence of post-surgical change bilaterally. AC Arthrosis bilaterally, with chronic appearing ossicle on left. On right, some GH arthrosis present with inferior joint spurring noted.  Appearance of Hill-Sachs lesion.          XR SHOULDER RIGHT G/E 3 VIEWS   4/28/2023 9:49 AM      HISTORY: Right shoulder pain, prior surgical repair; Bilateral  shoulder pain, unspecified chronicity; Bilateral shoulder pain,  unspecified chronicity  COMPARISON: None.      RIGHT SHOULDER                                                                    IMPRESSION: No acute fracture or dislocation. There are suspected  postoperative changes in the humeral head. There is  mild to moderate  acromioclavicular degenerative arthrosis. There is also mild  glenohumeral degenerative arthrosis with a small ossific density along  the inferior humeral head on the internal rotation view which may  represent an osteophyte or small intra-articular loose body.             XR SHOULDER LEFT G/E 3 VIEWS      HISTORY: Bilateral shoulder pain, unspecified chronicity; Bilateral  shoulder pain, unspecified chronicity  COMPARISON: None.      LEFT SHOULDER                                                                    IMPRESSION: No acute fracture or dislocation. Some  sclerosis in the  posterolateral humeral head may relate to a chronic Hill-Sachs lesion.  There is a corticated ossicle along the superior aspect of the left  acromioclavicular joint which likely relates to remote injury.     REBECCA SILVER MD         ========================  Multilevel degenerative change, DDD and facet arthrosis.        CERVICAL SPINE TWO TO THREE VIEWS 10/21/2022 1:50 PM     HISTORY: Fall, initial encounter    COMPARISON: None.    IMPRESSION: No grossly displaced cervical spine fracture is  appreciated by x-ray. Cervical spine CT is recommended in the setting  of cervical spine trauma for which imaging is indicated. Multilevel  moderate to severe degenerative disc disease. Multilevel  mild-to-moderate facet arthropathy. Soft tissues within normal limits.    OPAL NGO MD                  Again, thank you for allowing me to participate in the care of your patient.        Sincerely,        Robert Bullock, DO

## 2023-05-11 ENCOUNTER — ANCILLARY PROCEDURE (OUTPATIENT)
Dept: MRI IMAGING | Facility: CLINIC | Age: 58
End: 2023-05-11
Attending: PHYSICIAN ASSISTANT
Payer: COMMERCIAL

## 2023-05-11 DIAGNOSIS — S86.011A STRAIN OF RIGHT ACHILLES TENDON, INITIAL ENCOUNTER: ICD-10-CM

## 2023-05-11 PROCEDURE — 73721 MRI JNT OF LWR EXTRE W/O DYE: CPT | Mod: TC | Performed by: RADIOLOGY

## 2023-06-06 ENCOUNTER — TELEPHONE (OUTPATIENT)
Dept: FAMILY MEDICINE | Facility: CLINIC | Age: 58
End: 2023-06-06
Payer: COMMERCIAL

## 2023-06-06 NOTE — TELEPHONE ENCOUNTER
Patient Quality Outreach    Patient is due for the following:   Physical Preventive Adult Physical,  - Due after 07/2023      Topic Date Due     Hepatitis B Vaccine (1 of 3 - 3-dose series) Never done     COVID-19 Vaccine (4 - Pfizer series) 02/11/2022       Next Steps:   Schedule a nurse only visit for immunizations, Adult Preventative    Type of outreach:    Sent [employer] message.      Questions for provider review:    None           CLARK GUZMÁN MA

## 2023-06-27 ENCOUNTER — PATIENT OUTREACH (OUTPATIENT)
Dept: CARE COORDINATION | Facility: CLINIC | Age: 58
End: 2023-06-27
Payer: COMMERCIAL

## 2023-07-11 ENCOUNTER — PATIENT OUTREACH (OUTPATIENT)
Dept: CARE COORDINATION | Facility: CLINIC | Age: 58
End: 2023-07-11
Payer: COMMERCIAL

## 2023-08-24 ENCOUNTER — TELEPHONE (OUTPATIENT)
Dept: SLEEP MEDICINE | Facility: CLINIC | Age: 58
End: 2023-08-24
Payer: COMMERCIAL

## 2023-08-24 NOTE — TELEPHONE ENCOUNTER
General Call    Contacts         Type Contact Phone/Fax    08/24/2023 01:22 PM CDT Phone (Incoming) Faby Johnston (Self) [phone] (M)     repacement cpap          Reason for Call:  Faby needs new order for cpap replacement, earliest appointment is 10/31/23. He was requesting a new order of try to get an earlier appointment.    What are your questions or concerns:  order for cpap    Date of last appointment with provider: 4/4/2018    Could we send this information to you in [employer] or would you prefer to receive a phone call?:   Patient would prefer a phone call   Okay to leave a detailed message?: Yes at Cell number on file:    Telephone Information:   Mobile [phone]

## 2023-08-30 ENCOUNTER — MYC MEDICAL ADVICE (OUTPATIENT)
Dept: SLEEP MEDICINE | Facility: CLINIC | Age: 58
End: 2023-08-30
Payer: COMMERCIAL

## 2023-08-30 NOTE — TELEPHONE ENCOUNTER
More than 5 years since last appointment. Patient needs to be seen for orders. Mychart sent.     Deborah MELVIN RN  [facility]

## 2023-08-31 NOTE — CONFIDENTIAL NOTE
Called patient and clarified that he would need to see his provider before he can get order for anything.  He expressed understanding.  Patient also told to bring his machine with him to his appointment.    
negative - No discharge, No redness

## 2023-10-20 DIAGNOSIS — F41.1 GAD (GENERALIZED ANXIETY DISORDER): ICD-10-CM

## 2023-10-24 ASSESSMENT — SLEEP AND FATIGUE QUESTIONNAIRES
HOW LIKELY ARE YOU TO NOD OFF OR FALL ASLEEP WHILE SITTING AND TALKING TO SOMEONE: WOULD NEVER DOZE
HOW LIKELY ARE YOU TO NOD OFF OR FALL ASLEEP WHILE WATCHING TV: SLIGHT CHANCE OF DOZING
HOW LIKELY ARE YOU TO NOD OFF OR FALL ASLEEP WHEN YOU ARE A PASSENGER IN A CAR FOR AN HOUR WITHOUT A BREAK: SLIGHT CHANCE OF DOZING
HOW LIKELY ARE YOU TO NOD OFF OR FALL ASLEEP WHILE SITTING AND READING: SLIGHT CHANCE OF DOZING
HOW LIKELY ARE YOU TO NOD OFF OR FALL ASLEEP WHILE SITTING QUIETLY AFTER LUNCH WITHOUT ALCOHOL: WOULD NEVER DOZE
HOW LIKELY ARE YOU TO NOD OFF OR FALL ASLEEP WHILE SITTING INACTIVE IN A PUBLIC PLACE: WOULD NEVER DOZE
HOW LIKELY ARE YOU TO NOD OFF OR FALL ASLEEP WHILE LYING DOWN TO REST IN THE AFTERNOON WHEN CIRCUMSTANCES PERMIT: MODERATE CHANCE OF DOZING
HOW LIKELY ARE YOU TO NOD OFF OR FALL ASLEEP IN A CAR, WHILE STOPPED FOR A FEW MINUTES IN TRAFFIC: WOULD NEVER DOZE

## 2023-10-26 NOTE — COMMUNITY RESOURCES LIST (ENGLISH)
10/26/2023   [facility]  [facility]/A  For questions about this resource list or additional care needs, please contact your primary care clinic or care manager.  Phone: [phone]   Email: N/A   Address: [facility] [place] [address]   Hours: N/A        Hotlines and Helplines       Hotline - Housing crisis  1  [facility] Housing Resource Line Distance: 11.49 miles      Phone/Virtual   [address] [place] [address]  Language: English  Hours: Mon - Sun Open 24 Hours   Phone: (870) 904-4387 Website: https://www.[facility]./2689/Basic-Needs     2  [facility] Distance: 22.37 miles      Phone/Virtual   [address] [place] [address]  Language: English  Hours: Mon - Sun Open 24 Hours   Phone: (833) 144-9863 Email: communications@[facility].org Website: https://[facility].org/oursaviourshousing/          Housing       Coordinated Entry access point  3  [facility]  Office - [facility] Distance: 10.51 miles      Phone/Virtual   [address] [place] [address]  Language: English  Hours: Mon - Fri 8:30 AM - 12:00 PM , Mon - Fri 1:00 PM - 4:00 PM  Fees: Free   Phone: (217) 378-1368 Ext.2 Email: [email or web address]@[email or web address].org Website: https://www.[facility].org/usn/     4  [facility] ([facility] - Housing Services Distance: 22.52 miles      In-Person   [address] [place] [address]  Language: English  Hours: Mon - Fri 9:00 AM - 5:00 PM  Fees: Free   Phone: (567) 772-1349 Email: housing@[facility].org Website: http://www.[facility].org/housing     Drop-in center or day shelter  5  [facility] Distance: 21.03 miles      In-Person   [address] [place] [address]  Language: English, Hmong, Pakistani, Georgian  Hours: Mon - Thu 8:30 AM - 4:30 PM , Sat - Sun 9:00 AM - 12:00 PM  Fees: Free   Phone: (515) 413-8744 Email: info@[employer].org Website: https://[employer].org/     6   [facility] Distance: 21.96 miles      In-Person   [address] [place] [address]  Language: English, Finnish, Divehi  Hours: Mon - Sat 7:00 AM - 3:00 PM  Fees: Free, Self Pay   Phone: (302) 665-3521 Email: info@[employer].in Website: https://www.[employer]/locations/opportunity-center/     Housing search assistance  7  [facility] ([facility]) - [facility] Rental Housing Distance: 10.5 miles      In-Person, Phone/Virtual   [address] [place] [address]  Language: English  Hours: Mon - Fri 8:00 AM - 4:30 PM  Fees: Free   Phone: (394) 272-3568 Email: accap@[employer].org Website: http://www.accap.org     8  Neighborhood Assistance [employer] ([employer] Distance: 15.81 miles      Phone/Virtual   [address] [place] [address]  Language: English, Divehi  Hours: Mon - Fri 9:00 AM - 5:00 PM  Fees: Free   Phone: (692) 471-6527 Email: services@[employer] Website: https://www.[employer]     Shelter for families  9  [facility] Distance: 12.3 miles      In-Person   [address] [place] [address]  Language: English  Hours: Mon - Fri 3:00 PM - 9:00 AM , Sat - Sun Open 24 Hours  Fees: Free   Phone: (594) 141-5136 Ext.1 Website: https://www.[facility].org/2020/07/03/emergency-family-shelter/     Shelter for individuals  10  [facility] Distance: 21.34 miles      In-Person   [address] [place] [address]  Language: English  Hours: Mon - Fri 4:00 PM - 9:00 AM  Fees: Free   Phone: (540) 473-7256 Email: woodrow@[facility].org Website: https://[facility].org/[facility]/ 11  [facility] Distance: 21.37 miles      In-Person   [address] [place] [address]  Language: English  Hours: Mon - Sun 5:00 PM - 10:00  AM  Fees: Free, Self Pay   Phone: (528) 702-2346 Email: info@[employer] Website: https://www.[employer].org/locations/higher-ground-shelter/          Important Numbers & Websites       Emergency Services   911  [facility] Services   311  Poison Control   (503) 620-4244  Suicide Prevention Lifeline   (460) 374-4038 (TALK)  Child Abuse Hotline   (133) 316-1492 (4-A-Child)  Sexual Assault Hotline   (235) 850-3590 (HOPE)  National Runaway Safeline   (566) 398-2892 (RUNAWAY)  All-Options Talkline   (964) 613-8996  Substance Abuse Referral   (751) 650-4016 (HELP)

## 2023-10-28 ENCOUNTER — HEALTH MAINTENANCE LETTER (OUTPATIENT)
Age: 58
End: 2023-10-28

## 2023-10-31 ENCOUNTER — OFFICE VISIT (OUTPATIENT)
Dept: SLEEP MEDICINE | Facility: CLINIC | Age: 58
End: 2023-10-31
Payer: COMMERCIAL

## 2023-10-31 VITALS
BODY MASS INDEX: 31.78 KG/M2 | OXYGEN SATURATION: 94 % | SYSTOLIC BLOOD PRESSURE: 119 MMHG | WEIGHT: 227 LBS | HEART RATE: 74 BPM | HEIGHT: 71 IN | DIASTOLIC BLOOD PRESSURE: 79 MMHG

## 2023-10-31 DIAGNOSIS — G47.52 DREAM ENACTMENT BEHAVIOR: ICD-10-CM

## 2023-10-31 DIAGNOSIS — G47.33 OSA (OBSTRUCTIVE SLEEP APNEA): Primary | ICD-10-CM

## 2023-10-31 PROBLEM — G47.34 NOCTURNAL HYPOXEMIA: Status: RESOLVED | Noted: 2018-04-04 | Resolved: 2023-10-31

## 2023-10-31 PROCEDURE — 99203 OFFICE O/P NEW LOW 30 MIN: CPT

## 2023-10-31 NOTE — NURSING NOTE
"No chief complaint on file.      Initial /79   Pulse 74   Ht 1.803 m (5' 10.98\")   Wt 103 kg (227 lb)   SpO2 94%   BMI 31.67 kg/m   Estimated body mass index is 31.67 kg/m  as calculated from the following:    Height as of this encounter: 1.803 m (5' 10.98\").    Weight as of this encounter: 103 kg (227 lb).    Medication Reconciliation: complete    Neck circumference: 17.7 inches / 45 centimeters.    DME: Print out    Jodi Parks CMA on 10/31/2023 at 10:10 AM     "

## 2023-10-31 NOTE — PROGRESS NOTES
Outpatient Sleep Medicine Consultation:      Name: Luisito Johnston MRN# [id number]   Age: 58 year old YOB: 1965     Date of Consultation: October 31, 2023  Consultation is requested by: No referring provider defined for this encounter. No ref. provider found  Primary care provider: Red Trejo       Reason for Sleep Consult:     Luisito Johnston is sent by No ref. provider found for a sleep consultation regarding previously diagnosed ALLAN.    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 He was last seen by Fadi Fernandes MD on 04/04/2018. He uses his CPAP every night and feels much more rested with CPAP use. He would like a new machine. He sometimes feels like he is not getting enough pressure when he first puts his CPAP mask on. His download looks great. His leak is acceptable. His residual AHI is normal at 1.9 events/hr. His average usage is 7 hours 24 minutes. Faby denies difficulty initiating or maintaining sleep. Rare morning headaches. With his old machine, he was waking up with headaches all of the time. He denies restless legs. About one time per month he will remember a dream, and he will wake up hitting or kicking. He has never injured himself or his spouse. Denies falling out of bed. He has been on fluoxetine for 10+ years. Behaviors seem to happen in the beginning of the night within the first two hours of sleep. He is not sure how long he has been acting out his dreams. He denies anosmia.     Plan: Comprehensive  DME  His Auto-PAP was adjusted from 5-15 cmH2O to 8-15 cmH2O to eliminate air hunger when he initially puts his CPAP on. A prescription was written for new supplies as well as a replacement machine with settings of Auto-PAP 8-15 cmH2O. We reviewed recommendations for cleaning and replacing supplies as well as compliance goals. We discussed dream enactment behavior and REM sleep behavior disorder. Reviewed ways to keep his sleeping environment safe to prevent injury. Informed Pat fluoxetine and other SSRIs can be associated with RBD. We discussed management including switching to a different medication for management of anxiety or treating with melatonin. Pat is going to monitor the frequency of dream enactment, and we can re-evaluate at his follow-up appointment.         Comorbid Diagnoses:  MELIDA    Summary Recommendations:  Orders Placed This Encounter   Procedures    Comprehensive DME     Summary Counseling:    Sleep Testing Reviewed  Obstructive Sleep Apnea Reviewed  Complications of Untreated Sleep Apnea Reviewed    Patient will follow up in approximately 3 months to document compliance with his new machine.  BENIGNO Valero.    Total time spent reviewing medical records, history and physical examination, review of previous testing and interpretation as well as documentation on this date: 30 minutes    CC: No ref. provider found          History of Present Illness:     Pat presents to the sleep clinic to re-establish care for previously diagnosed severe ALLAN. He was last seen by Fadi Fernandes MD on 04/04/2018. He uses his CPAP every night and feels much more rested with CPAP. He would like a new machine.     Past Sleep Evaluations: HST on 02/06/2018 at 102 kg (BMI 31)- severe supine predominant ALLAN with some sleep associated hypoxemia (AHI was 60.4 events per hour and the patient spent 9.4 minutes under the SpO2 of 88%).     Snoring: No  Mask Leak: No  Type of Mask: Nasal pillows  Dry Nose or Mouth: Every  once in a while he will get a dry mouth.  Condensation in hose or mask: Sometimes   Nasal/Skin irritation: No    He sometimes feels like he is not getting enough pressure when he first turns his CPAP on. His ramp feature is off. He does not use the humidifier in the summer, but he does in the winter months.     DME: [facility], but he would [facility] to [facility] to [facility].     [employer] Dream[employer]   Auto-PAP 5-15 cmH2O: 10/01/2023-10/30/2023  The compliance data shows that the patient used the CPAP for 30/30 nights, 100% of nights for >4 hours.  The 90th% pressure is 10.8 cm.  The average time in large leak is 1 minute 54 seconds.  The average nightly usage is 7 hours 24 minutes.  The average AHI is 1.9 events/hr.     SLEEP-WAKE SCHEDULE:     Work/School Days: Patient goes to school/work: Yes   Usually gets into bed at Midnight  Takes patient about 5-10 minutes to fall asleep  Has trouble falling asleep 1 maybe nights per week  Wakes up in the middle of the night 1 maybe times.  Wakes up due to Other  He has trouble falling back asleep Hardly never times a week.   It usually takes 2 minutes to get back to sleep  Patient is usually up at 8 am  Uses alarm: No    Weekends/Non-work Days/All Other Days:  Usually gets into bed at 1 am   Takes patient about 5 minutes to fall asleep  Patient is usually up at 9 am  Uses alarm: No    Sleep Need  Patient gets 7 1/2 hours sleep on average   Patient thinks he needs about 7 1/2 sleep    Luisito Johnston prefers to sleep in this position(s): Back   Patient states they do the following activities in bed: Watch TV;Use phone, computer, or tablet    Naps  Patient takes a purposeful nap Hardly ever times a week and naps are usually 1 hour in duration. Maybe once every 7-10 days.   He feels better after a nap: Yes  He dozes off unintentionally 1 days per week  Patient has had a driving accident or near-miss due to sleepiness/drowsiness: No    SLEEP DISRUPTIONS:    Breathing/Snoring  Patient  snores: Yes, not with CPAP.  Other people complain about his snoring: Yes, not with CPAP.  Patient has been told he stops breathing in his sleep: Yes, not with CPAP.  He has issues with the following: Morning headaches;Stuffy nose when you wake up Rare morning headaches. With his old machine, he was waking up with headaches all of the time.     Movement:  Patient gets pain, discomfort, with an urge to move: Yes, he has to reposition a couple times per night. Denies restless legs.    It happens when he is resting: Yes  It happens more at night: Yes  Patient has been told he kicks his legs at night: Yes     Behaviors in Sleep:  Luisito Johnston has experienced the following behaviors while sleeping: Recurring Nightmares;Teeth grinding;Kicking or punching Recurring nightmares occurred about 3 years ago after sustaining a head injury. He had nightmares for 6-7 months. He does still grind his teeth. About one time per month he will remember a dream, and he will wake up hitting or kicking. He has never injured himself or his spouse. Denies falling out of bed. He has been on fluoxetine for 10+ years. Behaviors seem to happen in the beginning of the night within the first two hours. He is not sure how long he has been acting out his dreams. He denies anosmia.     He has experienced sudden muscle weakness during the day: No   Pt denies sleep talking, sleep walking, and dream enactment behavior. Pt denies sleep paralysis, hypnagogue and cataplexy.    Is there anything else you would like your sleep provider to know: No    CAFFEINE AND OTHER SUBSTANCES:    Patient consumes caffeinated beverages per day: 2-3  Last caffeine use is usually: 9 pm  List of any prescribed or over the counter stimulants that patient takes: None  List of any prescribed or over the counter sleep medication patient takes: None  List of previous sleep medications that patient has tried: He has never tried melatonin.   Patient drinks alcohol to help them  sleep: No  Patient drinks alcohol near bedtime: No    Family History:  Patient has a family member been diagnosed with a sleep disorder: Yes  Both sisters ,     Social History: He lives at home with his wife.     SCALES:    EPWORTH SLEEPINESS SCALE         10/24/2023     8:31 PM    [place] Sleepiness Scale ( KEVIN Cronin  [id number]<br>ESS - [place]/English - Final version - 21 Nov 07 - [facility] [place].)   Sitting and reading Slight chance of dozing   Watching TV Slight chance of dozing   Sitting, inactive in a public place (e.g. a theatre or a meeting) Would never doze   As a passenger in a car for an hour without a break Slight chance of dozing   Lying down to rest in the afternoon when circumstances permit Moderate chance of dozing   Sitting and talking to someone Would never doze   Sitting quietly after a lunch without alcohol Would never doze   In a car, while stopped for a few minutes in traffic Would never doze   [place] Score (MC) 5   [place] Score (Sleep) 5         INSOMNIA SEVERITY INDEX (FABIO)          10/24/2023     8:10 PM   Insomnia Severity Index (FABIO)   Difficulty falling asleep 0   Difficulty staying asleep 1   Problems waking up too early 1   How SATISFIED/DISSATISFIED are you with your CURRENT sleep pattern? 0   How NOTICEABLE to others do you think your sleep problem is in terms of impairing the quality of your life? 1   How WORRIED/DISTRESSED are you about your current sleep problem? 0   To what extent do you consider your sleep problem to INTERFERE with your daily functioning (e.g. daytime fatigue, mood, ability to function at work/daily chores, concentration, memory, mood, etc.) CURRENTLY? 0   FABIO Total Score 3       Guidelines for Scoring/Interpretation:  Total score categories:  0-7 = No clinically significant insomnia   8-14 = Subthreshold insomnia   15-21 = Clinical insomnia (moderate severity)  22-28 = Clinical insomnia (severe)  Used via courtesy of www.Meddleth.va.gov with permission  "from Wally Beckman PhD., [facility]      STOP BANG         10/31/2023    10:00 AM   STOP BANG Questionnaire (  2008, the American Society of Anesthesiologists, Inc. [employer]   Neck Cir (cm) Clinic: 45 cm   B/P Clinic: 119/79   BMI Clinic: 31.67         GAD7        7/27/2022    11:48 AM   MELIDA-7    1. Feeling nervous, anxious, or on edge 1   2. Not being able to stop or control worrying 0   3. Worrying too much about different things 1   4. Trouble relaxing 0   5. Being so restless that it is hard to sit still 0   6. Becoming easily annoyed or irritable 1   7. Feeling afraid, as if something awful might happen 0   MELIDA-7 Total Score 3   If you checked any problems, how difficult have they made it for you to do your work, take care of things at home, or get along with other people? Not difficult at all         CAGE-AID         No data to display                CAGE-AID reprinted with permission from the [place] Medical Journal, MALIK Santillan. and CONNOR Thomas, \"Conjoint screening questionnaires for alcohol and drug abuse\" [place] Medical Journal 94: 135-140, 1995.      PATIENT HEALTH QUESTIONNAIRE-9 (PHQ - 9)        7/27/2022    11:48 AM   PHQ-9 ([employer])   1.  Little interest or pleasure in doing things 0   2.  Feeling down, depressed, or hopeless 1   3.  Trouble falling or staying asleep, or sleeping too much 0   4.  Feeling tired or having little energy 1   5.  Poor appetite or overeating 1   6.  Feeling bad about yourself - or that you are a failure or have let yourself or your family down 0   7.  Trouble concentrating on things, such as reading the newspaper or watching television 1   8.  Moving or speaking so slowly that other people could have noticed. Or the opposite - being so fidgety or restless that you have been moving around a lot more than usual 0   9.  Thoughts that you would be better off dead, or of hurting yourself in some way 0   PHQ-9 Total Score 4   If you checked " off any problems, how difficult have these problems made it for you to do your work, take care of things at home, or get along with other people? Not difficult at all   6.  Feeling bad about yourself 0   7.  Trouble concentrating 1   8.  Moving slowly or restless 0   9.  Suicidal or self-harm thoughts 0   Difficulty at work, home, or with people Not difficult at all       Developed by Aleta Hughes, Cecy Aguirre, rIwin Marin and colleagues, with an educational david from [employer] No permission required to reproduce, translate, display or distribute.        Allergies:    Allergies   Allergen Reactions    Aspirin Nausea and Vomiting       Medications:    Current Outpatient Medications   Medication Sig Dispense Refill    FLUoxetine (PROZAC) 20 MG capsule TAKE 1 CAPSULE BY MOUTH DAILY 90 capsule 0       Problem List:  Patient Active Problem List    Diagnosis Date Noted    Advanced directives, counseling/discussion 06/02/2021     Priority: Medium     Pt was given info on ADVDinah Tobar MA      ALLAN (obstructive sleep apnea) 04/04/2018     Priority: Medium     HST on 02/06/2018 at 102 kg (BMI 31)- severe supine predominant ALLAN with some sleep associated hypoxemia (AHI was 60.4 events per hour and the patient spent 9.4 minutes under the SpO2 of 88%).       Generalized anxiety disorder 04/17/2015     Priority: Medium    CARDIOVASCULAR SCREENING; LDL GOAL LESS THAN 160 10/31/2010     Priority: Medium        Past Medical/Surgical History:  Past Medical History:   Diagnosis Date    Arthritis     Mother and Father    Chronic sinusitis     Polyposis    Depressive disorder Long time ago    Hyperlipidemia     Increased BMI     Psoriasis      Past Surgical History:   Procedure Laterality Date    ARTHROSCOPY KNEE Right 07/14/2017    Procedure: ARTHROSCOPY KNEE;  Arthroscopic Menisectomy Right Knee;  Surgeon: Jim Lemon MD;  Location: [facility] OR    COLONOSCOPY N/A 03/09/2015    Procedure: COLONOSCOPY;   Surgeon: Denys Archuleta MD;  Location: [facility] OR    COLONOSCOPY WITH CO2 INSUFFLATION N/A 03/09/2015    Procedure: COLONOSCOPY WITH CO2 INSUFFLATION;  Surgeon: Denys Archuleta MD;  Location: [facility] OR    ENT SURGERY      polyps from nasal passage    HC SHLDR ARTHROSCOP,PART ACROMIOPLAS  2000    SHOULDER SURGERY      SURGICAL HISTORY OF -   1991    Chronic Lt Shoulder Dislocation    TONSILLECTOMY         Social History:  Social History     Socioeconomic History    Marital status:      Spouse name: Not on file    Number of children: Not on file    Years of education: Not on file    Highest education level: Not on file   Occupational History    Not on file   Tobacco Use    Smoking status: Never    Smokeless tobacco: Never   Vaping Use    Vaping Use: Never used   Substance and Sexual Activity    Alcohol use: Yes     Comment: Rarely    Drug use: No    Sexual activity: Yes     Partners: Female     Birth control/protection: None   Other Topics Concern    Parent/sibling w/ CABG, MI or angioplasty before 65F 55M? No   Social History Narrative    Not on file     Social Determinants of Health     Financial Resource Strain: Low Risk  (10/26/2023)    Financial Resource Strain     Within the past 12 months, have you or your family members you live with been unable to get utilities (heat, electricity) when it was really needed?: No   Food Insecurity: Low Risk  (10/26/2023)    Food Insecurity     Within the past 12 months, did you worry that your food would run out before you got money to buy more?: No     Within the past 12 months, did the food you bought just not last and you didn t have money to get more?: No   Transportation Needs: Low Risk  (10/26/2023)    Transportation Needs     Within the past 12 months, has lack of transportation kept you from medical appointments, getting your medicines, non-medical meetings or appointments, work, or from getting things that you need?: No   Physical Activity: Not on file  "  Stress: Not on file   Social Connections: Not on file   Interpersonal Safety: Not on file   Housing Stability: High Risk (10/26/2023)    Housing Stability     Do you have housing? : No     Are you worried about losing your housing?: No       Family History:  Family History   Problem Relation Age of Onset    Respiratory Mother     Diabetes Father     Sleep Apnea Sister     Sleep Apnea Sister     Anxiety Disorder Sister         Both sisters       Review of Systems:  A complete review of systems reviewed by me is negative with the exeption of what has been mentioned in the history of present illness.  In the last TWO WEEKS have you experienced any of the following symptoms?  Fevers: No  Night Sweats: No  Weight Gain: Yes  Pain at Night: Yes  Double Vision: No  Changes in Vision: No  Difficulty Breathing through Nose: No  Sore Throat in Morning: No  Dry Mouth in the Morning: No  Shortness of Breath Lying Flat: No  Shortness of Breath With Activity: Yes  Awakening with Shortness of Breath: No  Increased Cough: No  Heart Racing at Night: No  Swelling in Feet or Legs: No  Diarrhea at Night: No  Heartburn at Night: No  Urinating More than Once at Night: No  Losing Control of Urine at Night: No  Joint Pains at Night: Yes  Headaches in Morning: Yes  Weakness in Arms or Legs: No  Depressed Mood: Yes  Anxiety: Yes     Physical Examination:  Vitals: /79   Pulse 74   Ht 1.803 m (5' 10.98\")   Wt 103 kg (227 lb)   SpO2 94%   BMI 31.67 kg/m    BMI= Body mass index is 31.67 kg/m .    Neck Cir (cm): 45 cm    GENERAL APPEARANCE: healthy, alert, no distress, and cooperative  EYES: Eyes grossly normal to inspection  NECK: no asymmetry, masses, or scars  RESP: no increased work of breathing noted, no audible cough or wheeze  NEURO: mentation intact and speech normal  PSYCH: mentation appears normal and affect normal/bright         Data: All pertinent previous laboratory data reviewed     Recent Labs   Lab Test 06/07/22 1931 " "     POTASSIUM 4.1   CHLORIDE 109   CO2 24   ANIONGAP 7   *   BUN 20   CR 1.66*   LISA 8.9       Recent Labs   Lab Test 06/07/22  1931   WBC 12.6*   RBC 4.74   HGB 14.8   HCT 43.6   MCV 92   MCH 31.2   MCHC 33.9   RDW 13.3          No results for input(s): \"PROTTOTAL\", \"ALBUMIN\", \"BILITOTAL\", \"ALKPHOS\", \"AST\", \"ALT\", \"BILIDIRECT\" in the last 09848 hours.    TSH (mU/L)   Date Value   11/13/2013 1.92       No results found for: \"UAMP\", \"UBARB\", \"BENZODIAZEUR\", \"UCANN\", \"UCOC\", \"OPIT\", \"UPCP\"    No results found for: \"IRONSAT\", \"IE59284\", \"JAYESH\"    No results found for: \"PH\", \"PHARTERIAL\", \"PO2\", \"ZM1ZDVDDAAU\", \"SAT\", \"PCO2\", \"HCO3\", \"BASEEXCESS\", \"LAZARUS\", \"BEB\"    @LABRCNTIPR(phv:4,pco2v:4,po2v:4,hco3v:4,duong:4,o2per:4)@    Echocardiology: No results found for this or any previous visit (from the past 4320 hour(s)).    Chest x-ray: No results found for this or any previous visit from the past 365 days.      Chest CT: No results found for this or any previous visit from the past 365 days.      PFT: Most Recent [facility] Pulmonary Function Testing    No results found for: \"20001\"  No results found for: \"20002\"  No results found for: \"20003\"  No results found for: \"20015\"  No results found for: \"20016\"  No results found for: \"20027\"  No results found for: \"20028\"  No results found for: \"20029\"  No results found for: \"20079\"  No results found for: \"20080\"  No results found for: \"20081\"  No results found for: \"20335\"  No results found for: \"20105\"  No results found for: \"20053\"  No results found for: \"20054\"  No results found for: \"20055\"      WILLIAM Miller CNP 10/31/2023   "

## 2023-10-31 NOTE — PATIENT INSTRUCTIONS
Preparing for Your Surgery  Getting started  A nurse will call you to review your health history and instructions. They will give you an arrival time based on your scheduled surgery time. Please be ready to share:  Your doctor's clinic name and phone number  Your medical, surgical, and anesthesia history  A list of allergies and sensitivities  A list of medicines, including herbal treatments and over-the-counter drugs  Whether the patient has a legal guardian (ask how to send us the papers in advance)  Please tell us if you're pregnant--or if there's any chance you might be pregnant. Some surgeries may injure a fetus (unborn baby), so they require a pregnancy test. Surgeries that are safe for a fetus don't always need a test, and you can choose whether to have one.   If you have a child who's having surgery, please ask for a copy of Preparing for Your Child's Surgery.    Preparing for surgery  Within 10 to 30 days of surgery: Have a pre-op exam (sometimes called an H&P, or History and Physical). This can be done at a clinic or pre-operative center.  If you're having a , you may not need this exam. Talk to your care team.  At your pre-op exam, talk to your care team about all medicines you take. If you need to stop any medicines before surgery, ask when to start taking them again.  We do this for your safety. Many medicines can make you bleed too much during surgery. Some change how well surgery (anesthesia) drugs work.  Call your insurance company to let them know you're having surgery. (If you don't have insurance, call [phone].)  Call your clinic if there's any change in your health. This includes signs of a cold or flu (sore throat, runny nose, cough, rash, fever). It also includes a scrape or scratch near the surgery site.  If you have questions on the day of surgery, call your hospital or surgery center.  Eating and drinking guidelines  For your safety: Unless your surgeon tells you otherwise,  follow the guidelines below.  Eat and drink as usual until 8 hours before you arrive for surgery. After that, no food or milk.  Drink clear liquids until 2 hours before you arrive. These are liquids you can see through, like water, Gatorade, and Propel Water. They also include plain black coffee and tea (no cream or milk), candy, and breath mints. You can spit out gum when you arrive.  If you drink alcohol: Stop drinking it the night before surgery.  If your care team tells you to take medicine on the morning of surgery, it's okay to take it with a sip of water.  Preventing infection  Shower or bathe the night before and morning of your surgery. Follow the instructions your clinic gave you. (If no instructions, use regular soap.)  Don't shave or clip hair near your surgery site. We'll remove the hair if needed.  Don't smoke or vape the morning of surgery. You may chew nicotine gum up to 2 hours before surgery. A nicotine patch is okay.  Note: Some surgeries require you to completely quit smoking and nicotine. Check with your surgeon.  Your care team will make every effort to keep you safe from infection. We will:  Clean our hands often with soap and water (or an alcohol-based hand rub).  Clean the skin at your surgery site with a special soap that kills germs.  Give you a special gown to keep you warm. (Cold raises the risk of infection.)  Wear special hair covers, masks, gowns and gloves during surgery.  Give antibiotic medicine, if prescribed. Not all surgeries need antibiotics.  What to bring on the day of surgery  Photo ID and insurance card  Copy of your health care directive, if you have one  Glasses and hearing aids (bring cases)  You can't wear contacts during surgery  Inhaler and eye drops, if you use them (tell us about these when you arrive)  CPAP machine or breathing device, if you use them  A few personal items, if spending the night  If you have . . .  A pacemaker, ICD (cardiac defibrillator) or other  implant: Bring the ID card.  An implanted stimulator: Bring the remote control.  A legal guardian: Bring a copy of the certified (court-stamped) guardianship papers.  Please remove any jewelry, including body piercings. Leave jewelry and other valuables at home.  If you're going home the day of surgery  You must have a responsible adult drive you home. They should stay with you overnight as well.  If you don't have someone to stay with you, and you aren't safe to go home alone, we may keep you overnight. Insurance often won't pay for this.  After surgery  If it's hard to control your pain or you need more pain medicine, please call your surgeon's office.  Questions?   If you have any questions for your care team, list them here: _________________________________________________________________________________________________________________________________________________________________________ ____________________________________ ____________________________________ ____________________________________  For informational purposes only. Not to replace the advice of your health care provider. Copyright   2003, 2019 [facility]. All rights reserved. Clinically reviewed by Carol Ann Harris MD. SMART[employer] 003012 - REV 12/22.    How to Take Your Medication Before Surgery  - Take all of your medications before surgery as usual

## 2023-10-31 NOTE — PROGRESS NOTES
[facility]  [address] [facility] [address]  Phone: [phone]  Primary Provider: Hugh Trejo  Pre-op Performing Provider: HUGH TREJO        PREOPERATIVE EVALUATION:  Today's date: 11/1/2023    Faby is a 58 year old male who presents for a preoperative evaluation.      11/1/2023     9:11 AM   Additional Questions   Roomed by Yuan TA LPN   Accompanied by Self         11/1/2023     9:11 AM   Patient Reported Additional Medications   Patient reports taking the following new medications None       Surgical Information:  Surgery/Procedure: Eye surgery  Surgery Location: [facility]   Surgeon: Mouna Bledsoe MD   Surgery Date: 11/15/2023  Time of Surgery: TBD  Where patient plans to recover: At home with family  Fax number for surgical facility: [phone]         Subjective       HPI related to upcoming procedure: eyelids are drooping bilaterally        10/26/2023     3:42 PM   Preop Questions   1. Have you ever had a heart attack or stroke? No   2. Have you ever had surgery on your heart or blood vessels, such as a stent placement, a coronary artery bypass, or surgery on an artery in your head, neck, heart, or legs? No   3. Do you have chest pain with activity? No   4. Do you have a history of  heart failure? No   5. Do you currently have a cold, bronchitis or symptoms of other infection? No   6. Do you have a cough, shortness of breath, or wheezing? No   7. Do you or anyone in your family have previous history of blood clots? No   8. Do you or does anyone in your family have a serious bleeding problem such as prolonged bleeding following surgeries or cuts? No   9. Have you ever had problems with anemia or been told to take iron pills? No   10. Have you had any abnormal blood loss such as black, tarry or bloody stools? No   11. Have you ever had a blood transfusion? No   12. Are you willing to have a blood transfusion if it is medically needed  before, during, or after your surgery? NO -      13. Have you or any of your relatives ever had problems with anesthesia? No   14. Do you have sleep apnea, excessive snoring or daytime drowsiness? YES -     14a. Do you have a CPAP machine? Yes   15. Do you have any artifical heart valves or other implanted medical devices like a pacemaker, defibrillator, or continuous glucose monitor? No   16. Do you have artificial joints? No   17. Are you allergic to latex? No       Health Care Directive:  Patient does not have a Health Care Directive or Living Will: Discussed advance care planning with patient; however, patient declined at this time.    Preoperative Review of :   reviewed - no record of controlled substances prescribed.          Review of Systems  CONSTITUTIONAL: NEGATIVE for fever, chills, change in weight  INTEGUMENTARY/SKIN: NEGATIVE for worrisome rashes, moles or lesions  EYES: NEGATIVE for vision changes or irritation  ENT/MOUTH: NEGATIVE for ear, mouth and throat problems  RESP: NEGATIVE for significant cough or SOB  CV: NEGATIVE for chest pain, palpitations or peripheral edema  GI: NEGATIVE for nausea, abdominal pain, heartburn, or change in bowel habits  : NEGATIVE for frequency, dysuria, or hematuria  MUSCULOSKELETAL: NEGATIVE for significant arthralgias or myalgia  NEURO: NEGATIVE for weakness, dizziness or paresthesias  ENDOCRINE: NEGATIVE for temperature intolerance, skin/hair changes  HEME: NEGATIVE for bleeding problems  PSYCHIATRIC: NEGATIVE for changes in mood or affect    Patient Active Problem List    Diagnosis Date Noted    Advanced directives, counseling/discussion 06/02/2021     Priority: Medium     Pt was given info on GERALD Tobar MA      ALLAN (obstructive sleep apnea) 04/04/2018     Priority: Medium     HST on 02/06/2018 at 102 kg (BMI 31)- severe supine predominant ALLAN with some sleep associated hypoxemia (AHI was 60.4 events per hour and the patient spent 9.4 minutes under the  "SpO2 of 88%).       Generalized anxiety disorder 04/17/2015     Priority: Medium    CARDIOVASCULAR SCREENING; LDL GOAL LESS THAN 160 10/31/2010     Priority: Medium      Past Medical History:   Diagnosis Date    Arthritis     Mother and Father    Chronic sinusitis     Polyposis    Depressive disorder Long time ago    Hyperlipidemia     Increased BMI     Psoriasis      Past Surgical History:   Procedure Laterality Date    ARTHROSCOPY KNEE Right 07/14/2017    Procedure: ARTHROSCOPY KNEE;  Arthroscopic Menisectomy Right Knee;  Surgeon: Jim Lemon MD;  Location: [facility] OR    COLONOSCOPY N/A 03/09/2015    Procedure: COLONOSCOPY;  Surgeon: Denys Archuleta MD;  Location: [facility] OR    COLONOSCOPY WITH CO2 INSUFFLATION N/A 03/09/2015    Procedure: COLONOSCOPY WITH CO2 INSUFFLATION;  Surgeon: Denys Archuleta MD;  Location: [facility] OR    ENT SURGERY      polyps from nasal passage    EYE SURGERY  11/15/2023    HC SHLDR ARTHROSCOP,PART ACROMIOPLAS  2000    SHOULDER SURGERY      SURGICAL HISTORY OF -   1991    Chronic Lt Shoulder Dislocation    TONSILLECTOMY       Current Outpatient Medications   Medication Sig Dispense Refill    FLUoxetine (PROZAC) 20 MG capsule TAKE 1 CAPSULE BY MOUTH DAILY 90 capsule 0       Allergies   Allergen Reactions    Aspirin Nausea and Vomiting and Swelling        Social History     Tobacco Use    Smoking status: Never    Smokeless tobacco: Never   Substance Use Topics    Alcohol use: Yes     Comment: Very little     Family History   Problem Relation Age of Onset    Respiratory Mother     Diabetes Father     Sleep Apnea Sister     Sleep Apnea Sister     Anxiety Disorder Sister         Both sisters     History   Drug Use No         Objective     /80   Pulse 78   Temp 98.2  F (36.8  C) (Tympanic)   Resp 16   Ht 1.784 m (5' 10.25\")   Wt 104.3 kg (230 lb)   SpO2 95%   BMI 32.77 kg/m      Physical Exam  GENERAL APPEARANCE: healthy, alert and no distress  HENT: ear canals and TM's " normal and nose and mouth without ulcers or lesions  RESP: lungs clear to auscultation - no rales, rhonchi or wheezes  CV: regular rate and rhythm, normal S1 S2, no S3 or S4 and no murmur, click or rub   ABDOMEN: soft, nontender, no HSM or masses and bowel sounds normal  NEURO: Normal strength and tone, sensory exam grossly normal, mentation intact and speech normal    Recent Labs   Lab Test 06/07/22  1931   HGB 14.8         POTASSIUM 4.1   CR 1.66*        Diagnostics:  No labs were ordered during this visit.   No EKG required for low risk surgery (cataract, skin procedure, breast biopsy, etc).    Revised Cardiac Risk Index (RCRI):  The patient has the following serious cardiovascular risks for perioperative complications:   - No serious cardiac risks = 0 points     RCRI Interpretation: 0 points: Class I (very low risk - 0.4% complication rate)    ICD-10-CM    1. Preop general physical exam  Z01.818 Primary Care - Care Coordination Referral      2. Bilateral cutis laxa of upper eyelids  H02.31     H02.34           Okay for surgery    Signed Electronically by: Red Trejo MD  Copy of this evaluation report is provided to requesting physician.

## 2023-10-31 NOTE — NURSING NOTE
Writer scheduled patient for a new CPAP follow up appointment in February. AVS handed to patient.

## 2023-10-31 NOTE — PATIENT INSTRUCTIONS
Tips to safeguard your sleeping environment to prevent injury from dream enactment:    Move hard or sharp objects or clutter away from the bedside (tables, etc).  Put pillows between you and a bed partner.  Use padded side rails on the bed to prevent falling out of bed, or move the mattress to the floor so there is not far to fall.  Pad the floor near the bed  Sleep in a sleeping bag to reduce how far or hard you can kick/punch.  Remove dangerous objects from the bedroom, such as sharp items and weapons  Protect bedroom windows to prevent you from being able to go out the window in your sleep.  Possibly sleep in a separate bed or room from your bed partner until symptoms are controlled    CPAP machines are often rent-to-own over 3-12 months depending on your insurance. During the first 3 months, insurances will often want to see at least 4 hours of use on 70% of nights over a 30 day period. Ideally, you would wear it whenever sleeping, but 4 hours is where health benefits really start.     If you don't like the first mask you get, you can exchange it once in the first month for free. Otherwise, insurance will cover new supplies about every 3 months. They will give you paperwork explaining how often to clean and replace parts when you get the machine.    I will see you back about 2-3 months after getting started on the CPAP. That appointment will be made once you get the CPAP.

## 2023-11-01 ENCOUNTER — OFFICE VISIT (OUTPATIENT)
Dept: FAMILY MEDICINE | Facility: CLINIC | Age: 58
End: 2023-11-01
Payer: COMMERCIAL

## 2023-11-01 ENCOUNTER — PATIENT OUTREACH (OUTPATIENT)
Dept: CARE COORDINATION | Facility: CLINIC | Age: 58
End: 2023-11-01

## 2023-11-01 VITALS
TEMPERATURE: 98.2 F | SYSTOLIC BLOOD PRESSURE: 130 MMHG | OXYGEN SATURATION: 95 % | RESPIRATION RATE: 16 BRPM | WEIGHT: 230 LBS | HEART RATE: 78 BPM | DIASTOLIC BLOOD PRESSURE: 80 MMHG | HEIGHT: 70 IN | BODY MASS INDEX: 32.93 KG/M2

## 2023-11-01 DIAGNOSIS — Z01.818 PREOP GENERAL PHYSICAL EXAM: Primary | ICD-10-CM

## 2023-11-01 DIAGNOSIS — H02.31: ICD-10-CM

## 2023-11-01 DIAGNOSIS — H02.34: ICD-10-CM

## 2023-11-01 PROCEDURE — 90471 IMMUNIZATION ADMIN: CPT | Performed by: FAMILY MEDICINE

## 2023-11-01 PROCEDURE — 90686 IIV4 VACC NO PRSV 0.5 ML IM: CPT | Performed by: FAMILY MEDICINE

## 2023-11-01 PROCEDURE — 99214 OFFICE O/P EST MOD 30 MIN: CPT | Mod: 25 | Performed by: FAMILY MEDICINE

## 2023-11-01 ASSESSMENT — ANXIETY QUESTIONNAIRES
7. FEELING AFRAID AS IF SOMETHING AWFUL MIGHT HAPPEN: NOT AT ALL
GAD7 TOTAL SCORE: 3
IF YOU CHECKED OFF ANY PROBLEMS ON THIS QUESTIONNAIRE, HOW DIFFICULT HAVE THESE PROBLEMS MADE IT FOR YOU TO DO YOUR WORK, TAKE CARE OF THINGS AT HOME, OR GET ALONG WITH OTHER PEOPLE: NOT DIFFICULT AT ALL
GAD7 TOTAL SCORE: 3
3. WORRYING TOO MUCH ABOUT DIFFERENT THINGS: NOT AT ALL
5. BEING SO RESTLESS THAT IT IS HARD TO SIT STILL: SEVERAL DAYS
1. FEELING NERVOUS, ANXIOUS, OR ON EDGE: SEVERAL DAYS
6. BECOMING EASILY ANNOYED OR IRRITABLE: SEVERAL DAYS
2. NOT BEING ABLE TO STOP OR CONTROL WORRYING: NOT AT ALL

## 2023-11-01 ASSESSMENT — PATIENT HEALTH QUESTIONNAIRE - PHQ9: 5. POOR APPETITE OR OVEREATING: NOT AT ALL

## 2023-11-01 ASSESSMENT — PAIN SCALES - GENERAL: PAINLEVEL: MILD PAIN (3)

## 2023-11-01 NOTE — COMMUNITY RESOURCES LIST (ENGLISH)
11/01/2023   [facility]  N/A  For questions about this resource list or additional care needs, please contact your primary care clinic or care manager.  Phone: [phone]   Email: N/A   Address: [address] [facility] [place] [address]   Hours: N/A        Hotlines and Helplines       Hotline - Housing crisis  1  [facility] Housing Resource Line Distance: 11.49 miles      Phone/Virtual   [address] [place] [address]  Language: English  Hours: Mon - Sun Open 24 Hours   Phone: (102) 156-9511 Website: https://www.[facility]./2689/Basic-Needs     2  [facility] Distance: 22.37 miles      Phone/Virtual   [address] [place] [address]  Language: English  Hours: Mon - Sun Open 24 Hours   Phone: (270) 212-6445 Email: communications@[facility].org Website: https://[facility].org/oursaviourshousing/          Housing       Coordinated Entry access point  3  [facility]  Office - [facility] Distance: 10.51 miles      Phone/Virtual   [address] [place] [address]  Language: English  Hours: Mon - Fri 8:30 AM - 12:00 PM , Mon - Fri 1:00 PM - 4:00 PM  Fees: Free   Phone: (266) 945-8745 Ext.2 Email: jeancarlos@[facility].org Website: https://www.[facility]ONE[facility].org/usn/     4  [facility] ([facility] - Housing Services Distance: 22.52 miles      In-Person   [address] [place] [address]  Language: English  Hours: Mon - Fri 9:00 AM - 5:00 PM  Fees: Free   Phone: (496) 602-7651 Email: housing@[facility].org Website: http://www.[facility].org/housing     Drop-in center or day shelter  5  [facility] Distance: 21.03 miles      In-Person   [address] [place] [address]  Language: English, Hmong, Vietnamese, Upper sorbian  Hours: Mon - Thu 8:30 AM - 4:30 PM , Sat - Sun 9:00 AM - 12:00 PM  Fees: Free   Phone: (128) 150-7744 Email: info@[employer].org Website: https://[employer].org/     6   [facility] and [facility] Distance: 21.96 miles      In-Person   [address] [place] [address]  Language: English, Sierra Leonean, Romanian  Hours: Mon - Sat 7:00 AM - 3:00 PM  Fees: Free, Self Pay   Phone: (761) 794-3201 Email: info@[employer] Website: https://www.[employer].Uppidy/locations/opportunity-center/     Housing search assistance  7  [facility] ([facility]) - [facility] Rental Housing Distance: 10.5 miles      In-Person, Phone/Virtual   [address] [place] [address]  Language: English  Hours: Mon - Fri 8:00 AM - 4:30 PM  Fees: Free   Phone: (526) 863-8257 Email: accap@accap.org Website: http://www.accap.org     8  [employer] [facility] [place] ([employer] Distance: 15.81 miles      Phone/Virtual   [address] [place] [address]  Language: English, Romanian  Hours: Mon - Fri 9:00 AM - 5:00 PM  Fees: Free   Phone: (784) 732-5953 Email: services@"[employer] Website: https://www."[employer]     Shelter for families  9  [facility] Distance: 12.3 miles      In-Person   [address] [place] [address]  Language: English  Hours: Mon - Fri 3:00 PM - 9:00 AM , Sat - Sun Open 24 Hours  Fees: Free   Phone: (657) 453-7655 Ext.1 Website: https://www.[employer]s.org/2020/07/03/emergency-family-shelter/     Shelter for individuals  10  [facility] Distance: 21.34 miles      In-Person   [address] [place] [address]  Language: English  Hours: Mon - Fri 4:00 PM - 9:00 AM  Fees: Free   Phone: (281) 748-3513 Email: woodrow@[facility].org Website: https://[facility].org/[facility]/ 11  [facility] and [place] - [facility] Distance: 21.37 miles      In-Person   [address] [place] [address]  Language: English  Hours: Mon - Sun 5:00 PM - 10:00  AM  Fees: Free, Self Pay   Phone: (639) 207-9700 Email: info@[employer] Website: https://www.[employer]ri.capital.org/locations/higher-ground-shelter/          Important Numbers & Websites       Emergency Services   911  [facility] Services   311  Poison Control   (370) 791-6691  Suicide Prevention Lifeline   (875) 328-5590 (TALK)  Child Abuse Hotline   (455) 554-8229 (4-A-Child)  Sexual Assault Hotline   (758) 707-7764 (HOPE)  National Runaway Safeline   (270) 548-8772 (RUNAWAY)  All-Options Talkline   (514) 753-8755  Substance Abuse Referral   (181) 322-9894 (HELP)

## 2023-11-01 NOTE — PROGRESS NOTES
Community Health Worker Initial Outreach     SDOH screening: Housing   Patient states that he answered incorrectly       Patient accepts CC: No, declined. Patient will be sent Care Coordination introduction letter for future reference.     SHAWNA Gomez [place], [place], [place], [place] and [facility]  [phone]

## 2023-11-02 ENCOUNTER — TELEPHONE (OUTPATIENT)
Dept: ORTHOPEDICS | Facility: CLINIC | Age: 58
End: 2023-11-02
Payer: COMMERCIAL

## 2023-11-02 NOTE — TELEPHONE ENCOUNTER
Talked with Pat,  Pat would like to schedule with Dr. Alvarenga for follow-up on shoulders, and still be seen by Dr. Bullock with upcoming appt on 11/10 for the ankle/knee issues.  Dung Mullins, LAT, ATC

## 2023-11-02 NOTE — TELEPHONE ENCOUNTER
Patient was left Clinic call-back number to schedule with Dr. Alvarenga at [phone].  KEEGAN Valladares, ATC

## 2023-11-02 NOTE — TELEPHONE ENCOUNTER
Talked with Pat about upcoming appt on 11/10 with Dr. Bullock.  Will advise Pat further after consultation with Dr. Alvarenga's team, as Dr. Bullock does not do US Guided GH injections, and Pat wanted injections for the right shoulder after having great relief.  Pat also is scheduled for his ankle follow-up which seems to be causing him increased soreness in the knee due to compensation with walking per patient and phone call.  Dung Mullins, LAT, ATC

## 2023-11-09 ENCOUNTER — TELEPHONE (OUTPATIENT)
Dept: FAMILY MEDICINE | Facility: CLINIC | Age: 58
End: 2023-11-09
Payer: COMMERCIAL

## 2023-11-09 NOTE — TELEPHONE ENCOUNTER
Linda from [facility] is calling asking for an addendum to the Pre-op stating that Pat is ok to proceed with the surgery.  She states that it is not noted in the pre-op.  The patient is having surgery on 11/15/2023.  Maru Huizar

## 2023-11-09 NOTE — TELEPHONE ENCOUNTER
Pre op resent after it was addended.    Abeba Kennedy,    [facility]

## 2023-11-10 ENCOUNTER — ANCILLARY PROCEDURE (OUTPATIENT)
Dept: GENERAL RADIOLOGY | Facility: CLINIC | Age: 58
End: 2023-11-10
Attending: PEDIATRICS
Payer: COMMERCIAL

## 2023-11-10 ENCOUNTER — OFFICE VISIT (OUTPATIENT)
Dept: ORTHOPEDICS | Facility: CLINIC | Age: 58
End: 2023-11-10
Payer: COMMERCIAL

## 2023-11-10 VITALS — HEIGHT: 70 IN | WEIGHT: 230 LBS | BODY MASS INDEX: 32.93 KG/M2

## 2023-11-10 DIAGNOSIS — M76.61 ACHILLES TENDINITIS OF RIGHT LOWER EXTREMITY: ICD-10-CM

## 2023-11-10 DIAGNOSIS — M25.561 RIGHT KNEE PAIN, UNSPECIFIED CHRONICITY: Primary | ICD-10-CM

## 2023-11-10 DIAGNOSIS — M54.2 CERVICALGIA: ICD-10-CM

## 2023-11-10 DIAGNOSIS — M25.561 CHRONIC PAIN OF RIGHT KNEE: ICD-10-CM

## 2023-11-10 DIAGNOSIS — G89.29 CHRONIC PAIN OF RIGHT KNEE: ICD-10-CM

## 2023-11-10 PROCEDURE — 73562 X-RAY EXAM OF KNEE 3: CPT | Mod: TC | Performed by: RADIOLOGY

## 2023-11-10 PROCEDURE — 99214 OFFICE O/P EST MOD 30 MIN: CPT | Performed by: PEDIATRICS

## 2023-11-10 NOTE — LETTER
11/10/2023         RE: Luisito Johnston  [address]  [facility] [address]        Dear Colleague,    Thank you for referring your patient, Luisito Johnston, to the [facility]. Please see a copy of my visit note below.    ASSESSMENT & PLAN    Pat was seen today for follow up.    Diagnoses and all orders for this visit:    Right knee pain, unspecified chronicity  -     XR Knee Standing AP [place] Bilat Lat Right; Future  -     Physical Therapy Referral; Future    Achilles tendinitis of right lower extremity  -     Physical Therapy Referral; Future    Cervicalgia  -     MRI Cervical spine w/o contrast; Future        See Patient Instructions  Patient Instructions   Right Achilles:  Reviewed MRI from May 2023, along with course.  Peers to have flared the Achilles tendinopathy related to a lot of walking this fall.  Plan physical therapy next, order placed.  If not responding to PT, future consideration could be further discussion with one of my colleagues for procedure such as tenotomy with Tenex versus PRP.    Right knee:  Some underlying degenerative change noted on x-rays.  Pain may also be stemming from change in mechanics due to the Achilles tendinopathy.  No additional imaging required currently, though we could consider MRI.  Plan physical therapy for the knee as well, order has been placed to address Achilles and knee simultaneously.  Future consideration could include injection for the knee, steroid versus imaging guided Visco injection.    Cervical spine:  We reviewed course, including with positional pain and numbness to the left upper extremity.  With ongoing symptoms, went ahead and placed MRI scan for cervical spine.    Advanced imaging is done by appointment. Please call [facility] ([facility]/[place]) [phone] , [facility] ([facility]/[place]) [phone] , or [facility] ([facility]  [place]) [phone]  to schedule your MRI.     Some insurance companies may require a prior authorization to be completed which can delay the time until you are able to schedule your appointment.       If you are active on [employer], you may have access to your test results before your provider is able to review the study and advise on next steps.      Please make a follow up appointment in the clinic no sooner than 2 days following your MRI by calling [phone].  Alternatively, if interested in phone or [employer] message with results and potential next steps, contact clinic after study has been completed.        If you have any further questions for your physician or physician s care team you can contact them thru [employer] or by calling [phone].      Robert Bullock [facility]    SUBJECTIVE- Interim History November 10, 2023    Chief Complaint   Patient presents with     Right Ankle - Follow Up       Luisito Johnston is a 58 year old male who is seen in f/u up for Chronic pain of right ankle. Since last visit on 5/10/23 patient has felt throbbing pain in both the knee and ankle. MRI completed for the achilles pain.  Pain has been increased since the end of August (3 months). Notes a feeling of a marble lump in the achilles with visible protrusion.  Works security at [employer].  Reports that right knee has been bothering him more, starting 3 months ago. Reports that knee goes outward when he walks.  States that the achilles is TTP      The patient is seen by themselves.        Orthopedic/Surgical history: YES - Date: 1990 & 1997, 2010 Bilateral shoulders  Social History/Occupation: sales, gardening/ landscaping      **  Above information per rooming staff.  Additional history:  Right achilles area:  Constant throbbing. Thinks flare may have come from working at [employer], a lot of walking.  Back in May prior to MRI, had pain improved, and swelling/bump had  "improved. Over much of summer pain had resolved.  Now pain returned after working [employer].    Right knee:  Notes knee mechanics have changed with walking.  Also feels like right knee externally rotates a bit standing and walking.  With walking, feels like right knee wants to buckle laterally (varus position). Then also gets tension/pain laterally.  No noted right knee swelling or right knee noise.        **  Chart reviewed:  Previous care through ortho surgery 2017, medial meniscus surgery with Dr Lemon.    **  Notes if extending cervical spine shoots numbness down to left UE, shoulder, upper arm. Has not noted beyond left elbow.  Minimal if any symptoms to left hand.  Mild pos Spurling to left, neg to right.      REVIEW OF SYSTEMS:  Review of Systems    OBJECTIVE:  Ht 1.778 m (5' 10\")   Wt 104.3 kg (230 lb)   BMI 33.00 kg/m           Right Knee exam    Inspection:   trace effusion   no ecchymosis    ROM:      Full active and passive ROM with flexion and extension    Patellar Motion:      Normal patellar tracking noted through range of motion    Tender:      lateral joint line       Distal lateral hamstring tendon       Mild palpable prominence lateral joint line more posteriorly compared to left    Special Tests:      neg (-) Lamberto       neg (-) Lachman       neg (-) anterior drawer       neg (-) posterior drawer       neg (-) varus        neg (-) valgus        No change in pain with forced extension      =================      Right achilles with focal thickening mid portion, with localized tenderness there  Gotti test normal      ====================    Cervical Spine Exam    Range of Motion:       forward flexion no change         extension mild-mod limitation with pain radiating to left shoulder area     Inspection:       Head forward posture    Special Tests:     Spurling with reproduced symptoms to left shoulder/UE    Skin:     well perfused       capillary refill brisk    Lymphatics:      no " edema noted in the upper extremities         RADIOLOGY:  Final results and radiologist's interpretation, available in the [facility] health record.  Images were reviewed with the patient in the office today.  My personal interpretation of the performed imaging: knees with mild underlying degenerative change.      XR KNEE STANDING AP [place] BILAT LAT RIGHT 11/10/2023 9:24 AM      HISTORY: Chronic pain of right knee; Chronic pain of right knee     COMPARISON: None.                                                                       IMPRESSION:   Mild medial compartment degenerative change right knee. No acute  fracture or joint effusion.     Views of the left knee are unremarkable.     ERICKA YUSUF MD         =========================================      EXAM: MR ANKLE RIGHT W/O CONTRAST  LOCATION: [facility]  DATE/TIME: 5/11/2023 4:49 PM CDT     INDICATION: Right Achilles tenderness and swelling.  COMPARISON: None.  TECHNIQUE: Unenhanced.     FINDINGS:      TENDONS:   -Peroneal: Peroneus longus and brevis tendons are intact. No tendinopathy or tenosynovitis. No subluxation.  -Medial: Posterior tibialis tendon is intact. No tendinopathy or tenosynovitis. Flexor digitorum longus and flexor hallucis longus tendons are normal. No tenosynovitis.  -Anterior: Anterior tibialis, extensor hallucis longus, and extensor digitorum longus tendons are normal. No tenosynovitis.  -Achilles: Moderate fusiform dilatation of the midportion of the Achilles tendon compatible with tendinopathy without significant superimposed tearing. These changes correspond to the area of maximal discomfort as delineated by a soft tissue marker.   There is mild adjacent paratenonitis.     LIGAMENTS:   -Anterior talofibular ligament: Intact.   -Calcaneofibular ligament: Intact.   -Posterior talofibular ligament: Intact.  -Syndesmotic inferior tibiofibular ligaments: Intact.  -Deltoid ligament complex: Intact.  -Spring ligament  complex: Intact.     JOINTS AND BONES:   -No fracture, bone contusion or osteochondral lesion. Normal articular cartilage. No joint effusion or synovitis.     SOFT TISSUES:  -Plantar fascia: Intact. No acute fasciitis or tear.  -Sinus tarsi and tarsal tunnel: Normal.  -Muscles: Normal.                                                                      IMPRESSION:  1.  Moderate noninsertional tendinosis of the Achilles tendon with mild adjacent paratenonitis. No superimposed tearing.     2.  Otherwise normal MRI of the right ankle.                CERVICAL SPINE TWO TO THREE VIEWS   10/21/2022 1:50 PM      HISTORY: Fall, initial encounter     COMPARISON: None.                                                                      IMPRESSION: No grossly displaced cervical spine fracture is  appreciated by x-ray. Cervical spine CT is recommended in the setting  of cervical spine trauma for which imaging is indicated. Multilevel  moderate to severe degenerative disc disease. Multilevel  mild-to-moderate facet arthropathy. Soft tissues within normal limits.     OPAL NGO MD             Over 30 minutes spent by me on the date of the encounter doing chart review, history and exam, documentation and further activities per the note           Again, thank you for allowing me to participate in the care of your patient.        Sincerely,        Robert Bullock DO

## 2023-11-10 NOTE — PATIENT INSTRUCTIONS
Right Achilles:  Reviewed MRI from May 2023, along with course.  Peers to have flared the Achilles tendinopathy related to a lot of walking this fall.  Plan physical therapy next, order placed.  If not responding to PT, future consideration could be further discussion with one of my colleagues for procedure such as tenotomy with Tenex versus PRP.    Right knee:  Some underlying degenerative change noted on x-rays.  Pain may also be stemming from change in mechanics due to the Achilles tendinopathy.  No additional imaging required currently, though we could consider MRI.  Plan physical therapy for the knee as well, order has been placed to address Achilles and knee simultaneously.  Future consideration could include injection for the knee, steroid versus imaging guided Visco injection.    Cervical spine:  We reviewed course, including with positional pain and numbness to the left upper extremity.  With ongoing symptoms, went ahead and placed MRI scan for cervical spine.    Advanced imaging is done by appointment. Please call [facility] ([facility]/[place]) [phone] , [facility] ([facility]/[place]) [phone] , or [facility] ([facility]) [phone]  to schedule your MRI.     Some insurance companies may require a prior authorization to be completed which can delay the time until you are able to schedule your appointment.       If you are active on [employer], you may have access to your test results before your provider is able to review the study and advise on next steps.      Please make a follow up appointment in the clinic no sooner than 2 days following your MRI by calling [phone].  Alternatively, if interested in phone or [employer] message with results and potential next steps, contact clinic after study has been completed.        If you have any further questions for your physician or physician s care team you can contact  them thru [employer] or by calling [phone].

## 2023-11-10 NOTE — PROGRESS NOTES
ASSESSMENT & PLAN    Pat was seen today for follow up.    Diagnoses and all orders for this visit:    Right knee pain, unspecified chronicity  -     XR Knee Standing AP [place] Bilat Lat Right; Future  -     Physical Therapy Referral; Future    Achilles tendinitis of right lower extremity  -     Physical Therapy Referral; Future    Cervicalgia  -     MRI Cervical spine w/o contrast; Future        See Patient Instructions  Patient Instructions   Right Achilles:  Reviewed MRI from May 2023, along with course.  Peers to have flared the Achilles tendinopathy related to a lot of walking this fall.  Plan physical therapy next, order placed.  If not responding to PT, future consideration could be further discussion with one of my colleagues for procedure such as tenotomy with Tenex versus PRP.    Right knee:  Some underlying degenerative change noted on x-rays.  Pain may also be stemming from change in mechanics due to the Achilles tendinopathy.  No additional imaging required currently, though we could consider MRI.  Plan physical therapy for the knee as well, order has been placed to address Achilles and knee simultaneously.  Future consideration could include injection for the knee, steroid versus imaging guided Visco injection.    Cervical spine:  We reviewed course, including with positional pain and numbness to the left upper extremity.  With ongoing symptoms, went ahead and placed MRI scan for cervical spine.    Advanced imaging is done by appointment. Please call [facility] ([facility]/[place]) [phone] , [facility] ([facility]/[place]) [phone] , or [facility] ([facility]) [phone]  to schedule your MRI.     Some insurance companies may require a prior authorization to be completed which can delay the time until you are able to schedule your appointment.       If you are active on [employer], you may have access to your test  results before your provider is able to review the study and advise on next steps.      Please make a follow up appointment in the clinic no sooner than 2 days following your MRI by calling [phone].  Alternatively, if interested in phone or [employer] message with results and potential next steps, contact clinic after study has been completed.        If you have any further questions for your physician or physician s care team you can contact them thru [employer] or by calling [phone].      Robert Bullock [facility]    SUBJECTIVE- Interim History November 10, 2023    Chief Complaint   Patient presents with    Right Ankle - Follow Up       Luisito Johnston is a 58 year old male who is seen in f/u up for Chronic pain of right ankle. Since last visit on 5/10/23 patient has felt throbbing pain in both the knee and ankle. MRI completed for the achilles pain.  Pain has been increased since the end of August (3 months). Notes a feeling of a marble lump in the achilles with visible protrusion.  Works security at [employer].  Reports that right knee has been bothering him more, starting 3 months ago. Reports that knee goes outward when he walks.  States that the achilles is TTP      The patient is seen by themselves.        Orthopedic/Surgical history: YES - Date: 1990 & 1997, 2010 Bilateral shoulders  Social History/Occupation: sales, gardening/ landscaping      **  Above information per rooming staff.  Additional history:  Right achilles area:  Constant throbbing. Thinks flare may have come from working at [employer], a lot of walking.  Back in May prior to MRI, had pain improved, and swelling/bump had improved. Over much of summer pain had resolved.  Now pain returned after working [employer].    Right knee:  Notes knee mechanics have changed with walking.  Also feels like right knee externally rotates a bit standing and walking.  With walking, feels like right knee wants  "to buckle laterally (varus position). Then also gets tension/pain laterally.  No noted right knee swelling or right knee noise.        **  Chart reviewed:  Previous care through ortho surgery 2017, medial meniscus surgery with Dr Lemon.    **  Notes if extending cervical spine shoots numbness down to left UE, shoulder, upper arm. Has not noted beyond left elbow.  Minimal if any symptoms to left hand.  Mild pos Spurling to left, neg to right.      REVIEW OF SYSTEMS:  Review of Systems    OBJECTIVE:  Ht 1.778 m (5' 10\")   Wt 104.3 kg (230 lb)   BMI 33.00 kg/m           Right Knee exam    Inspection:   trace effusion   no ecchymosis    ROM:      Full active and passive ROM with flexion and extension    Patellar Motion:      Normal patellar tracking noted through range of motion    Tender:      lateral joint line       Distal lateral hamstring tendon       Mild palpable prominence lateral joint line more posteriorly compared to left    Special Tests:      neg (-) Lamberto       neg (-) Lachman       neg (-) anterior drawer       neg (-) posterior drawer       neg (-) varus        neg (-) valgus        No change in pain with forced extension      =================      Right achilles with focal thickening mid portion, with localized tenderness there  Gotti test normal      ====================    Cervical Spine Exam    Range of Motion:       forward flexion no change         extension mild-mod limitation with pain radiating to left shoulder area     Inspection:       Head forward posture    Special Tests:     Spurling with reproduced symptoms to left shoulder/UE    Skin:     well perfused       capillary refill brisk    Lymphatics:      no edema noted in the upper extremities         RADIOLOGY:  Final results and radiologist's interpretation, available in the [facility] health record.  Images were reviewed with the patient in the office today.  My personal interpretation of the performed imaging: knees with mild " underlying degenerative change.      XR KNEE STANDING AP [place] BILAT LAT RIGHT 11/10/2023 9:24 AM      HISTORY: Chronic pain of right knee; Chronic pain of right knee     COMPARISON: None.                                                                       IMPRESSION:   Mild medial compartment degenerative change right knee. No acute  fracture or joint effusion.     Views of the left knee are unremarkable.     ERICKA YUSUF MD         =========================================      EXAM: MR ANKLE RIGHT W/O CONTRAST  LOCATION: [facility]  DATE/TIME: 5/11/2023 4:49 PM CDT     INDICATION: Right Achilles tenderness and swelling.  COMPARISON: None.  TECHNIQUE: Unenhanced.     FINDINGS:      TENDONS:   -Peroneal: Peroneus longus and brevis tendons are intact. No tendinopathy or tenosynovitis. No subluxation.  -Medial: Posterior tibialis tendon is intact. No tendinopathy or tenosynovitis. Flexor digitorum longus and flexor hallucis longus tendons are normal. No tenosynovitis.  -Anterior: Anterior tibialis, extensor hallucis longus, and extensor digitorum longus tendons are normal. No tenosynovitis.  -Achilles: Moderate fusiform dilatation of the midportion of the Achilles tendon compatible with tendinopathy without significant superimposed tearing. These changes correspond to the area of maximal discomfort as delineated by a soft tissue marker.   There is mild adjacent paratenonitis.     LIGAMENTS:   -Anterior talofibular ligament: Intact.   -Calcaneofibular ligament: Intact.   -Posterior talofibular ligament: Intact.  -Syndesmotic inferior tibiofibular ligaments: Intact.  -Deltoid ligament complex: Intact.  -Spring ligament complex: Intact.     JOINTS AND BONES:   -No fracture, bone contusion or osteochondral lesion. Normal articular cartilage. No joint effusion or synovitis.     SOFT TISSUES:  -Plantar fascia: Intact. No acute fasciitis or tear.  -Sinus tarsi and tarsal tunnel:  Normal.  -Muscles: Normal.                                                                      IMPRESSION:  1.  Moderate noninsertional tendinosis of the Achilles tendon with mild adjacent paratenonitis. No superimposed tearing.     2.  Otherwise normal MRI of the right ankle.                CERVICAL SPINE TWO TO THREE VIEWS   10/21/2022 1:50 PM      HISTORY: Fall, initial encounter     COMPARISON: None.                                                                      IMPRESSION: No grossly displaced cervical spine fracture is  appreciated by x-ray. Cervical spine CT is recommended in the setting  of cervical spine trauma for which imaging is indicated. Multilevel  moderate to severe degenerative disc disease. Multilevel  mild-to-moderate facet arthropathy. Soft tissues within normal limits.     OPAL NGO MD             Over 30 minutes spent by me on the date of the encounter doing chart review, history and exam, documentation and further activities per the note

## 2023-11-16 ENCOUNTER — TELEPHONE (OUTPATIENT)
Dept: FAMILY MEDICINE | Facility: CLINIC | Age: 58
End: 2023-11-16
Payer: COMMERCIAL

## 2023-11-16 NOTE — TELEPHONE ENCOUNTER
(There is a [employer] message with information, was not able to add this to the string of messages and then route appropriately)    Reason for Call:  Pt calling to update the location for the fax     What are your questions or concerns:  Pt is calling because he is upset that this is taking so long. States he sent over the information on the 13th and has had no response after that. Pt states it is going to the [facility]. Address: [address] [place] [address]  Fax: [phone]. Pt wants this faxed over to [facility] TODAY. Please assist pt.      Date of last appointment with provider: 10/31/2023     Could we send this information to you in [employer] or would you prefer to receive a phone call?:   Patient would prefer a phone call   Okay to leave a detailed message?: Yes at Cell number on file:        Telephone Information:   Mobile [phone]

## 2023-11-16 NOTE — TELEPHONE ENCOUNTER
Supply order and office visit faxed to number provided.    Detailed message let on identified voicemail letting patient know    Deborah MELVIN RN  [facility]

## 2023-11-20 NOTE — PROGRESS NOTES
Luisito Johnston  :  1965  DOS: 2023  MRN: [id number]    Sports Medicine Clinic Visit    PCP: {PCP:182976}    Luisito Johnston is a 58 year old {HAND DOMINANCE:569709} male who is seen {FSOC CONSULT:543286} presenting with ***    Injury: {Precipitating Event:181166}*** {DAY/WEEK/MONTH/YEAR:013507}.  Pain located over {side:5002} ***, {RADIATING PAIN:752227}.  Reports {costant or intermittent:920307} radiating, {048370:789026} to ***.  Additional Features:  Positive: {Additional Features:505372}.  Symptoms are better with {RELIEVING FACTORS/MSPAIN:898537}.  Symptoms are worse with: {Painful Motions:723280}.  Other evaluation and/or treatments so far consists of: {RELIEVING FACTORS/MSPAIN:918042}.  Recent imaging completed: {Imagin}.  Prior History of related problems: ***    Social History: {FSOCJob:528100}    Review of Systems  Musculoskeletal: as above  Remainder of review of systems is negative including constitutional, CV, pulmonary, GI, Skin and Neurologic except as noted in HPI or medical history.    Past Medical History:   Diagnosis Date    Arthritis     Mother and Father    Chronic sinusitis     Polyposis    Depressive disorder Long time ago    Hyperlipidemia     Increased BMI     Psoriasis      Past Surgical History:   Procedure Laterality Date    ARTHROSCOPY KNEE Right 2017    Procedure: ARTHROSCOPY KNEE;  Arthroscopic Menisectomy Right Knee;  Surgeon: Jim Lemon MD;  Location: [facility]    COLONOSCOPY N/A 2015    Procedure: COLONOSCOPY;  Surgeon: Denys Archuleta MD;  Location: [facility]    COLONOSCOPY WITH CO2 INSUFFLATION N/A 2015    Procedure: COLONOSCOPY WITH CO2 INSUFFLATION;  Surgeon: Denys Archuleta MD;  Location: [facility]    ENT SURGERY      polyps from nasal passage    EYE SURGERY  11/15/2023    [facility] SHLDR ARTHROSCOP,PART ACROMIOPLAS  2000    SHOULDER SURGERY      SURGICAL HISTORY OF -       Chronic Lt Shoulder Dislocation    TONSILLECTOMY    "    Family History   Problem Relation Age of Onset    Respiratory Mother     Diabetes Father     Sleep Apnea Sister     Sleep Apnea Sister     Anxiety Disorder Sister         Both sisters       Objective  There were no vitals taken for this visit.    General: healthy, alert and in no distress    HEENT: no scleral icterus or conjunctival erythema   Skin: no suspicious lesions or rash. No jaundice.   CV: regular rhythm by palpation, 2+ distal pulses, no pedal edema    Resp: normal respiratory effort without conversational dyspnea   Psych: normal mood and affect    Gait: ***antalgic, appropriate coordination and balance   Neuro: normal light touch sensory exam of the extremities. Motor strength as noted below     {RIGHT/LEFT:558352::\"Bilateral\"} Shoulder exam    ROM:   {Shoulder rom:433835::\"     Full active and passive ROM with flexion, extension, abduction, internal and external rotation.\"}    Tender:   {shoulder Tenderness:443344}    Non Tender:  {shoulder non tender:325808::\"     remainder of shoulder\"}    Strength:   {shoulder strength:875215}    Impingement testing:   {impingement:957065}    Stability testing:  {stability :566270}    Skin:  {skin:272449}    Sensation:   {sensation:359869::\"     normal sensation over shoulder and upper extremity\"}       Radiology  ***    Assessment:  No diagnosis found.    Plan:  Discussed the assessment with the patient.  {[facility] Plan:204928::\"Follow up: ***\"}      Disclaimer: This note consists of symbols derived from keyboarding, dictation and/or voice recognition software. As a result, there may be errors in the script that have gone undetected. Please consider this when interpreting information found in this chart.    "

## 2023-11-22 ENCOUNTER — OFFICE VISIT (OUTPATIENT)
Dept: ORTHOPEDICS | Facility: CLINIC | Age: 58
End: 2023-11-22
Payer: COMMERCIAL

## 2023-11-22 VITALS
SYSTOLIC BLOOD PRESSURE: 117 MMHG | HEART RATE: 71 BPM | WEIGHT: 230 LBS | BODY MASS INDEX: 33 KG/M2 | DIASTOLIC BLOOD PRESSURE: 78 MMHG

## 2023-11-22 DIAGNOSIS — R29.898 SHOULDER WEAKNESS: ICD-10-CM

## 2023-11-22 DIAGNOSIS — M25.511 CHRONIC PAIN OF BOTH SHOULDERS: Primary | ICD-10-CM

## 2023-11-22 DIAGNOSIS — G89.29 CHRONIC PAIN OF BOTH SHOULDERS: Primary | ICD-10-CM

## 2023-11-22 DIAGNOSIS — M25.512 CHRONIC PAIN OF BOTH SHOULDERS: Primary | ICD-10-CM

## 2023-11-22 DIAGNOSIS — M19.011 OSTEOARTHRITIS OF RIGHT GLENOHUMERAL JOINT: ICD-10-CM

## 2023-11-22 DIAGNOSIS — Z98.890 HX OF ROTATOR CUFF SURGERY: ICD-10-CM

## 2023-11-22 PROCEDURE — 99213 OFFICE O/P EST LOW 20 MIN: CPT | Mod: 25 | Performed by: FAMILY MEDICINE

## 2023-11-22 PROCEDURE — 20611 DRAIN/INJ JOINT/BURSA W/US: CPT | Mod: RT | Performed by: FAMILY MEDICINE

## 2023-11-22 RX ORDER — ROPIVACAINE HYDROCHLORIDE 5 MG/ML
3 INJECTION, SOLUTION EPIDURAL; INFILTRATION; PERINEURAL
Status: SHIPPED | OUTPATIENT
Start: 2023-11-22

## 2023-11-22 RX ORDER — TRIAMCINOLONE ACETONIDE 40 MG/ML
40 INJECTION, SUSPENSION INTRA-ARTICULAR; INTRAMUSCULAR
Status: SHIPPED | OUTPATIENT
Start: 2023-11-22

## 2023-11-22 RX ADMIN — ROPIVACAINE HYDROCHLORIDE 3 ML: 5 INJECTION, SOLUTION EPIDURAL; INFILTRATION; PERINEURAL at 11:49

## 2023-11-22 RX ADMIN — TRIAMCINOLONE ACETONIDE 40 MG: 40 INJECTION, SUSPENSION INTRA-ARTICULAR; INTRAMUSCULAR at 11:49

## 2023-11-22 NOTE — LETTER
2023         RE: Luisito Johnston  [address]  [facility] [address]        Dear Colleague,    Thank you for referring your patient, Luisito Johnston, to the [facility]. Please see a copy of my visit note below.    Luisito Johnston  :  1965  DOS: 2023  MRN: [id number]    Sports Medicine Clinic Visit    PCP: Red Trejo MD     Luisito Johnston is a 58 year old Right hand dominant male who is seen as a follow up for right shoulder pain.     Interim History - 2023  Since last visit on 5/10/23 patient states that he felt relief from previous injection for about 6 months. Was at [employer] in July and had a flare up after golfing. Pain located top of the shoulder.Taking tylenol and ibuprofen prn. Worse with sleeping, laying down. Shoulder is OK with daily activities. No interim injury. Would like repeat injection today.       Review of Systems  Musculoskeletal: as above  Remainder of review of systems is negative including constitutional, CV, pulmonary, GI, Skin and Neurologic except as noted in HPI or medical history.    Past Medical History:   Diagnosis Date     Arthritis     Mother and Father     Chronic sinusitis     Polyposis     Depressive disorder Long time ago     Hyperlipidemia      Increased BMI      Psoriasis      Past Surgical History:   Procedure Laterality Date     ARTHROSCOPY KNEE Right 2017    Procedure: ARTHROSCOPY KNEE;  Arthroscopic Menisectomy Right Knee;  Surgeon: Jim Lemon MD;  [facility]     COLONOSCOPY N/A 2015    Procedure: COLONOSCOPY;  Surgeon: Denys Archuleta MD;  [facility]     COLONOSCOPY WITH CO2 INSUFFLATION N/A 2015    Procedure: COLONOSCOPY WITH CO2 INSUFFLATION;  Surgeon: Denys Archuleta MD;  [facility]: [facility]     ENT SURGERY      polyps from nasal passage     EYE SURGERY  11/15/2023      SHLDR ARTHROSCOP,PART ACROMIOPLAS  2000     SHOULDER SURGERY        SURGICAL HISTORY OF -   1991    Chronic Lt Shoulder Dislocation     TONSILLECTOMY       Family History   Problem Relation Age of Onset     Respiratory Mother      Diabetes Father      Sleep Apnea Sister      Sleep Apnea Sister      Anxiety Disorder Sister         Both sisters       Objective  /78   Pulse 71   Wt 104.3 kg (230 lb)   BMI 33.00 kg/m      General: healthy, alert and in no distress    HEENT: no scleral icterus or conjunctival erythema   Skin: no suspicious lesions or rash. No jaundice.   CV: regular rhythm by palpation, 2+ distal pulses, no pedal edema    Resp: normal respiratory effort without conversational dyspnea   Psych: normal mood and affect    Gait: nonantalgic, appropriate coordination and balance   Neuro: normal light touch sensory exam of the extremities. Motor strength as noted below     Right Shoulder exam    ROM:        Mildly decreased terminal active and passive ROM with flexion, extension, abduction, internal and external rotation.    Tender:        subacromial space       posterior shoulder most focal       Anterior GH joint    Non Tender:       remainder of shoulder       sternoclavicular joint       acromioclavicular joint    Strength:        abduction 3/5       internal rotation 5/5       external rotation 3/5       adduction 5/5    Impingement testing:        neg (-) Neer       positive (+) Antonio       positive (+) empty can       neg (-) crossover       positive (+) crank    Stability testing:       neg (-) anterior glide       neg (-) sulcus sign    Skin:       no visible deformities       well perfused       capillary refill brisk    Sensation:        normal sensation over shoulder and upper extremity     Radiology  Recent Results (from the past 744 hour(s))   XR Knee Standing AP [place] Bilat Lat Right    Narrative    XR KNEE STANDING AP [place] BILAT LAT RIGHT 11/10/2023 9:24 AM     HISTORY: Chronic pain of right knee; Chronic pain of right knee    COMPARISON: None.        Impression    IMPRESSION:   Mild medial compartment degenerative change right knee. No acute  fracture or joint effusion.    Views of the left knee are unremarkable.    ERICKA YUSUF MD         SYSTEM ID:  [id number]       Large Joint Injection/Arthocentesis: R glenohumeral joint    Date/Time: 11/22/2023 11:49 AM    Performed by: Robert Alvarenga DO  Authorized by: Robert Alvarenga DO    Indications:  Pain  Needle Size:  21 G  Guidance: ultrasound    Approach:  Posterolateral  Location:  Shoulder      Site:  R glenohumeral joint  Medications:  40 mg triamcinolone 40 MG/ML; 3 mL ROPivacaine 5 MG/ML  Outcome:  Tolerated well, no immediate complications  Procedure discussed: discussed risks, benefits, and alternatives    Consent Given by:  Patient  Timeout: timeout called immediately prior to procedure    Prep: patient was prepped and draped in usual sterile fashion     Ultrasound images of procedure were permanently stored.         Assessment:  1. Chronic pain of both shoulders    2. Osteoarthritis of right glenohumeral joint    3. Shoulder weakness    4. Hx of rotator cuff surgery        Plan:  Discussed the assessment with the patient.  Follow up: prn based on short term clinical progress  Recurrent shoulder pain similar to previous on the right, not much currently on the left  Multiple surgeries to the right RTC and surrounding region over the years, cannot r/o recurrent tear, weakness on exam today  Suspect some measure of RTC arthropathy based on imaging, hx, exam  Can return to Dr Lemon in the future if needed based on clinical progress  Reviewed goals, limitations and relative risks of CSI  Repeat Us guided CSI to the right GH joint today  XR images independently visualized and reviewed with patient today in clinic  Oral Tylenol and topical Voltaren gel reviewed as safe OTC options, reviewed safe dosing strategies  Expectations and goals of CSI reviewed  Often 2-3 days for steroid  effect, and can take up to two weeks for maximum effect  We discussed modified progressive pain-free activity as tolerated  Do not overuse in first two weeks if feeling better due to concern for vulnerability while steroid is working  Supportive care reviewed  All questions were answered today  Contact us with additional questions or concerns  Signs and sx of concern reviewed      Robert Alvarenga DO, ANGELES  Sports Medicine Physician  [facility]        Disclaimer: This note consists of symbols derived from keyboarding, dictation and/or voice recognition software. As a result, there may be errors in the script that have gone undetected. Please consider this when interpreting information found in this chart.      Again, thank you for allowing me to participate in the care of your patient.        Sincerely,        Robert Alvarenga DO

## 2023-11-22 NOTE — PROGRESS NOTES
Luisito Johnston  :  1965  DOS: 2023  MRN: [id number]    Sports Medicine Clinic Visit    PCP: Red Trejo MD     Luisito Johnston is a 58 year old Right hand dominant male who is seen as a follow up for right shoulder pain.     Interim History - 2023  Since last visit on 5/10/23 patient states that he felt relief from previous injection for about 6 months. Was at [employer] in July and had a flare up after golfing. Pain located top of the shoulder.Taking tylenol and ibuprofen prn. Worse with sleeping, laying down. Shoulder is OK with daily activities. No interim injury. Would like repeat injection today.       Review of Systems  Musculoskeletal: as above  Remainder of review of systems is negative including constitutional, CV, pulmonary, GI, Skin and Neurologic except as noted in HPI or medical history.    Past Medical History:   Diagnosis Date    Arthritis     Mother and Father    Chronic sinusitis     Polyposis    Depressive disorder Long time ago    Hyperlipidemia     Increased BMI     Psoriasis      Past Surgical History:   Procedure Laterality Date    ARTHROSCOPY KNEE Right 2017    Procedure: ARTHROSCOPY KNEE;  Arthroscopic Menisectomy Right Knee;  Surgeon: Jim Lemon MD;  Location: [facility]    COLONOSCOPY N/A 2015    Procedure: COLONOSCOPY;  Surgeon: Denys Archuleta MD;  Location: [facility]    COLONOSCOPY WITH CO2 INSUFFLATION N/A 2015    Procedure: COLONOSCOPY WITH CO2 INSUFFLATION;  Surgeon: Denys Archuleta MD;  Location: [facility]    ENT SURGERY      polyps from nasal passage    EYE SURGERY  11/15/2023    [facility] SHLDR ARTHROSCOP,PART ACROMIOPLAS  2000    SHOULDER SURGERY      SURGICAL HISTORY OF -       Chronic Lt Shoulder Dislocation    TONSILLECTOMY       Family History   Problem Relation Age of Onset    Respiratory Mother     Diabetes Father     Sleep Apnea Sister     Sleep Apnea Sister     Anxiety Disorder Sister         Both sisters        Objective  /78   Pulse 71   Wt 104.3 kg (230 lb)   BMI 33.00 kg/m      General: healthy, alert and in no distress    HEENT: no scleral icterus or conjunctival erythema   Skin: no suspicious lesions or rash. No jaundice.   CV: regular rhythm by palpation, 2+ distal pulses, no pedal edema    Resp: normal respiratory effort without conversational dyspnea   Psych: normal mood and affect    Gait: nonantalgic, appropriate coordination and balance   Neuro: normal light touch sensory exam of the extremities. Motor strength as noted below     Right Shoulder exam    ROM:        Mildly decreased terminal active and passive ROM with flexion, extension, abduction, internal and external rotation.    Tender:        subacromial space       posterior shoulder most focal       Anterior GH joint    Non Tender:       remainder of shoulder       sternoclavicular joint       acromioclavicular joint    Strength:        abduction 3/5       internal rotation 5/5       external rotation 3/5       adduction 5/5    Impingement testing:        neg (-) Neer       positive (+) Antonio       positive (+) empty can       neg (-) crossover       positive (+) crank    Stability testing:       neg (-) anterior glide       neg (-) sulcus sign    Skin:       no visible deformities       well perfused       capillary refill brisk    Sensation:        normal sensation over shoulder and upper extremity     Radiology  Recent Results (from the past 744 hour(s))   XR Knee Standing AP [place] Bilat Lat Right    Narrative    XR KNEE STANDING AP [place] BILAT LAT RIGHT 11/10/2023 9:24 AM     HISTORY: Chronic pain of right knee; Chronic pain of right knee    COMPARISON: None.       Impression    IMPRESSION:   Mild medial compartment degenerative change right knee. No acute  fracture or joint effusion.    Views of the left knee are unremarkable.    ERICKA YUSUF MD         SYSTEM ID:  [id number]       Large Joint Injection/Arthocentesis: R  glenohumeral joint    Date/Time: 11/22/2023 11:49 AM    Performed by: Robert Alvarenga DO  Authorized by: Robert Alvarenga DO    Indications:  Pain  Needle Size:  21 G  Guidance: ultrasound    Approach:  Posterolateral  Location:  Shoulder      Site:  R glenohumeral joint  Medications:  40 mg triamcinolone 40 MG/ML; 3 mL ROPivacaine 5 MG/ML  Outcome:  Tolerated well, no immediate complications  Procedure discussed: discussed risks, benefits, and alternatives    Consent Given by:  Patient  Timeout: timeout called immediately prior to procedure    Prep: patient was prepped and draped in usual sterile fashion     Ultrasound images of procedure were permanently stored.         Assessment:  1. Chronic pain of both shoulders    2. Osteoarthritis of right glenohumeral joint    3. Shoulder weakness    4. Hx of rotator cuff surgery        Plan:  Discussed the assessment with the patient.  Follow up: prn based on short term clinical progress  Recurrent shoulder pain similar to previous on the right, not much currently on the left  Multiple surgeries to the right RTC and surrounding region over the years, cannot r/o recurrent tear, weakness on exam today  Suspect some measure of RTC arthropathy based on imaging, hx, exam  Can return to Dr Lemon in the future if needed based on clinical progress  Reviewed goals, limitations and relative risks of CSI  Repeat Us guided CSI to the right GH joint today  XR images independently visualized and reviewed with patient today in clinic  Oral Tylenol and topical Voltaren gel reviewed as safe OTC options, reviewed safe dosing strategies  Expectations and goals of CSI reviewed  Often 2-3 days for steroid effect, and can take up to two weeks for maximum effect  We discussed modified progressive pain-free activity as tolerated  Do not overuse in first two weeks if feeling better due to concern for vulnerability while steroid is working  Supportive care reviewed  All questions  were answered today  Contact us with additional questions or concerns  Signs and sx of concern reviewed      Robert Alvarenga DO, CAQ  Sports Medicine Physician  [facility]        Disclaimer: This note consists of symbols derived from keyboarding, dictation and/or voice recognition software. As a result, there may be errors in the script that have gone undetected. Please consider this when interpreting information found in this chart.

## 2023-11-27 ENCOUNTER — HOSPITAL ENCOUNTER (OUTPATIENT)
Dept: MRI IMAGING | Facility: CLINIC | Age: 58
Discharge: HOME | End: 2023-11-27
Attending: PEDIATRICS | Admitting: PEDIATRICS
Payer: COMMERCIAL

## 2023-11-27 DIAGNOSIS — M54.2 CERVICALGIA: ICD-10-CM

## 2023-11-27 PROBLEM — M25.571 PAIN IN JOINT INVOLVING ANKLE AND FOOT, RIGHT: Status: ACTIVE | Noted: 2023-11-27

## 2023-11-27 PROBLEM — G89.29 CHRONIC PAIN OF RIGHT KNEE: Status: ACTIVE | Noted: 2023-11-27

## 2023-11-27 PROBLEM — M25.561 CHRONIC PAIN OF RIGHT KNEE: Status: ACTIVE | Noted: 2023-11-27

## 2023-11-27 PROCEDURE — 72141 MRI NECK SPINE W/O DYE: CPT

## 2023-11-27 ASSESSMENT — ACTIVITIES OF DAILY LIVING (ADL)
SQUAT: ACTIVITY IS NOT DIFFICULT
RAW_SCORE: 58
GO DOWN STAIRS: ACTIVITY IS MINIMALLY DIFFICULT
GO UP STAIRS: ACTIVITY IS MINIMALLY DIFFICULT
KNEE_ACTIVITY_OF_DAILY_LIVING_SUM: 58
HOW_WOULD_YOU_RATE_THE_OVERALL_FUNCTION_OF_YOUR_KNEE_DURING_YOUR_USUAL_DAILY_ACTIVITIES?: NEARLY NORMAL
KNEEL ON THE FRONT OF YOUR KNEE: ACTIVITY IS NOT DIFFICULT
GIVING WAY, BUCKLING OR SHIFTING OF KNEE: I DO NOT HAVE THE SYMPTOM
RISE FROM A CHAIR: ACTIVITY IS NOT DIFFICULT
SWELLING: I DO NOT HAVE THE SYMPTOM
KNEE_ACTIVITY_OF_DAILY_LIVING_SCORE: 82.86
LIMPING: THE SYMPTOM AFFECTS MY ACTIVITY MODERATELY
HOW_WOULD_YOU_RATE_THE_CURRENT_FUNCTION_OF_YOUR_KNEE_DURING_YOUR_USUAL_DAILY_ACTIVITIES_ON_A_SCALE_FROM_0_TO_100_WITH_100_BEING_YOUR_LEVEL_OF_KNEE_FUNCTION_PRIOR_TO_YOUR_INJURY_AND_0_BEING_THE_INABILITY_TO_PERFORM_ANY_OF_YOUR_USUAL_DAILY_ACTIVITIES?: 15
WALK: ACTIVITY IS MINIMALLY DIFFICULT
AS_A_RESULT_OF_YOUR_KNEE_INJURY,_HOW_WOULD_YOU_RATE_YOUR_CURRENT_LEVEL_OF_DAILY_ACTIVITY?: NEARLY NORMAL
SIT WITH YOUR KNEE BENT: ACTIVITY IS NOT DIFFICULT
STAND: ACTIVITY IS NOT DIFFICULT
PAIN: THE SYMPTOM AFFECTS MY ACTIVITY SLIGHTLY
WEAKNESS: THE SYMPTOM AFFECTS MY ACTIVITY SLIGHTLY
STIFFNESS: THE SYMPTOM AFFECTS MY ACTIVITY SLIGHTLY

## 2023-11-27 NOTE — PROGRESS NOTES
PHYSICAL THERAPY EVALUATION  Type of Visit: Evaluation    See electronic medical record for Abuse and Falls Screening details.    Subjective   Pt is a 57 yo male presenting to PT with R ankle and knee pain (ankle > knee). 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 This gradually resolved itself by April; pt received imaging around this time which revealed tendinosis of his achilles tendon. Pt had no pain during the summer until he worked the state [employer] again, in which his pain resumed this September. Pt states he has a bulge in the back of his ankle in the tendon. His pain has limited him to walking only a few blocks at a time, and he states his gait mechanics have changed as well due to the pain; states his foot rotates outwards as he walks. These faulty gait mechanics have now caused lateral knee pain. 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 Pt wishes to return to pain free work and walking.         Presenting condition or subjective complaint: Finding out if my pain is from my archillis or knew  Date of onset: 09/01/23    Relevant medical history: Arthritis   Dates & types of surgery:      Prior diagnostic imaging/testing results: MRI; X-ray     Prior therapy history for the same diagnosis, illness or injury: No      Prior Level of Function  Transfers: Independent  Ambulation: Independent  ADL: Independent    Living Environment  Social support: With a significant other or spouse   Type of home: House   Stairs to enter the home: Yes 2 Is there a railing: No   Ramp: No   Stairs inside the home: Yes 15 Is there a railing: Yes   Help at home: Other  Equipment owned:       Employment: Yes [employer]  Hobbies/Interests: Gardening, shows, sports games    Patient goals for therapy: Walk straight    Pain assessment: Pain present     Objective   FOOT/ANKLE EVALUATION  INTEGUMENTARY (edema, incisions):  No foot/ankle swelling. Achilles  tendon appears enlarged with mass in the middle of the tendon.  GAIT:   Gait Deviations:  Decreased stance time on R, R tibial ER present to decrease toe off which results in varus force through his knee.   BALANCE/PROPRIOCEPTION:  Deferred  ROM:  Knee: 2-0-135 B. Ankle: WNL B (~8 deg past neutral for DF). CKC DF: 12 cm B.   STRENGTH:  MMT: ankle 5/5 B. Unable to perform SL calf raise d/t pain. Able to perform 10 DL calf raises with soreness but not pain.   FUNCTIONAL TESTS:  DL squat: dynamic valgus, increased knee translation anteriorly  PALPATION:  Pain to achilles tendon at mid insertion. No tenderness to lateral knee  JOINT MOBILITY:  Normal patellar mobility    Assessment & Plan   CLINICAL IMPRESSIONS  Medical Diagnosis: R knee pain, achilles tendonitis    Treatment Diagnosis: R knee pain, R achilles tendonitis   Impression/Assessment: Patient is a 58 year old male with R ankle/knee complaints.  The following significant findings have been identified: Pain, Decreased strength, Impaired gait, Impaired muscle performance, and Decreased activity tolerance. These impairments interfere with their ability to perform self care tasks, work tasks, recreational activities, and community mobility as compared to previous level of function.     Clinical Decision Making (Complexity):  Clinical Presentation: Stable/Uncomplicated  Clinical Presentation Rationale: based on medical and personal factors listed in PT evaluation  Clinical Decision Making (Complexity): Low complexity    PLAN OF CARE  Treatment Interventions:  Modalities: Cryotherapy, E-stim  Interventions: Gait Training, Manual Therapy, Neuromuscular Re-education, Therapeutic Activity, Therapeutic Exercise    Long Term Goals     PT Goal 1  Goal Identifier: Strength  Goal Description: Pt will perform 10 pain free SL calf raises  Rationale: to maximize safety and independence with performance of ADLs and functional tasks;to maximize safety and independence within the  community  Goal Progress: Unable to perform 1  Target Date: 01/23/24      Frequency of Treatment: 1x/wk  Duration of Treatment: 8 wks    Recommended Referrals to Other Professionals:  none  Education Assessment:   Learner/Method: Patient;Demonstration;Pictures/Video  Education Comments: Gait faults d/t pain/avoidance, knee pain as a result of tibial external rotation during gait. strength timeline, soreness rules. Increasing strength to increase tolerance for prolonged standing necessary at [employer], and continuing strength exercises even after PT for improved tolerance    Risks and benefits of evaluation/treatment have been explained.   Patient/Family/caregiver agrees with Plan of Care.     Evaluation Time:     PT Eval, Low Complexity Minutes (00490): 30    Signing Clinician: LIZET MICHAEL PT

## 2023-11-28 ENCOUNTER — THERAPY VISIT (OUTPATIENT)
Dept: PHYSICAL THERAPY | Facility: CLINIC | Age: 58
End: 2023-11-28
Attending: PEDIATRICS
Payer: COMMERCIAL

## 2023-11-28 DIAGNOSIS — G89.29 CHRONIC PAIN OF RIGHT KNEE: Primary | ICD-10-CM

## 2023-11-28 DIAGNOSIS — M25.561 CHRONIC PAIN OF RIGHT KNEE: Primary | ICD-10-CM

## 2023-11-28 DIAGNOSIS — M25.571 PAIN IN JOINT INVOLVING ANKLE AND FOOT, RIGHT: ICD-10-CM

## 2023-11-28 DIAGNOSIS — M76.61 ACHILLES TENDINITIS OF RIGHT LOWER EXTREMITY: ICD-10-CM

## 2023-11-28 DIAGNOSIS — M25.561 RIGHT KNEE PAIN, UNSPECIFIED CHRONICITY: ICD-10-CM

## 2023-11-28 PROCEDURE — 97110 THERAPEUTIC EXERCISES: CPT | Mod: GP

## 2023-11-28 PROCEDURE — 97161 PT EVAL LOW COMPLEX 20 MIN: CPT | Mod: GP

## 2023-12-05 ENCOUNTER — THERAPY VISIT (OUTPATIENT)
Dept: PHYSICAL THERAPY | Facility: CLINIC | Age: 58
End: 2023-12-05
Attending: PEDIATRICS
Payer: COMMERCIAL

## 2023-12-05 DIAGNOSIS — G89.29 CHRONIC PAIN OF RIGHT KNEE: Primary | ICD-10-CM

## 2023-12-05 DIAGNOSIS — M25.571 PAIN IN JOINT INVOLVING ANKLE AND FOOT, RIGHT: ICD-10-CM

## 2023-12-05 DIAGNOSIS — M25.561 CHRONIC PAIN OF RIGHT KNEE: Primary | ICD-10-CM

## 2023-12-05 PROCEDURE — 97110 THERAPEUTIC EXERCISES: CPT | Mod: GP | Performed by: PHYSICAL THERAPIST

## 2023-12-08 ENCOUNTER — TELEPHONE (OUTPATIENT)
Dept: ORTHOPEDICS | Facility: CLINIC | Age: 58
End: 2023-12-08
Payer: COMMERCIAL

## 2023-12-08 NOTE — TELEPHONE ENCOUNTER
MRI results below:  KEEGAN Valladares, ATC      MRI CERVICAL SPINE WITHOUT CONTRAST 11/27/2023 9:55 AM      HISTORY: Neck pain with radiating symptoms to left upper extremity;  Cervicalgia      TECHNIQUE: Multiplanar, multisequence MRI of the cervical spine  without contrast.      COMPARISON: 10/21/2022 radiographs.      FINDINGS: Trace retrolisthesis of C3 on C4. Anterior posterior  alignment of the spine is otherwise within normal limits. Vertebral  body height is maintained without evidence of fracture. There are no  destructive osseous lesions. Minimal multilevel degenerative endplate  changes.     No abnormal signal appreciated within the visualized spinal cord.     Level by level as follows:      C2-C3: Normal disc height. Small posterior disc protrusion. Minimal  facet arthropathy and uncovertebral hypertrophy. No significant neural  foraminal narrowing or canal stenosis.      C3-C4: Retrolisthesis. Moderate loss of disc height and diffuse disc  bulge. Mild facet arthropathy and uncovertebral hypertrophy. Severe  bilateral neural foraminal narrowing and mild canal stenosis.      C4-C5: Moderate disc height loss. Posterior disc osteophyte complex.  Mild facet arthropathy and uncovertebral hypertrophy. Moderate to  severe bilateral neural foraminal narrowing and mild canal stenosis.      C5-C6: Mild loss of disc height. Left paracentral posterior disc  protrusion. Mild facet arthropathy and uncovertebral hypertrophy.  Severe right and moderate left neural foraminal narrowing and mild  canal stenosis.      C6-C7: Moderate loss of disc height. Posterior disc osteophyte  complex. Mild facet arthropathy and uncovertebral hypertrophy. No  neural foraminal narrowing. Mild canal stenosis.      C7-T1: No loss of disc height. Left paracentral disc protrusion. Mild  facet arthropathy and uncovertebral hypertrophy. No significant neural  foraminal narrowing. Mild canal stenosis.      Paraspinous soft tissues are  unremarkable.                                                                       IMPRESSION:  Multilevel cervical spondylosis, as above. No high-grade  canal stenosis. Severe bilateral C3-C4 and severe right C5-C6 neural  foraminal narrowing.         ALVIN SALDANA MD

## 2023-12-12 ENCOUNTER — THERAPY VISIT (OUTPATIENT)
Dept: PHYSICAL THERAPY | Facility: CLINIC | Age: 58
End: 2023-12-12
Attending: PEDIATRICS
Payer: COMMERCIAL

## 2023-12-12 DIAGNOSIS — G89.29 CHRONIC PAIN OF RIGHT KNEE: Primary | ICD-10-CM

## 2023-12-12 DIAGNOSIS — M25.561 CHRONIC PAIN OF RIGHT KNEE: Primary | ICD-10-CM

## 2023-12-12 DIAGNOSIS — M25.571 PAIN IN JOINT INVOLVING ANKLE AND FOOT, RIGHT: ICD-10-CM

## 2023-12-12 PROCEDURE — 97110 THERAPEUTIC EXERCISES: CPT | Mod: GP | Performed by: PHYSICAL THERAPIST

## 2023-12-20 ENCOUNTER — OFFICE VISIT (OUTPATIENT)
Dept: FAMILY MEDICINE | Facility: CLINIC | Age: 58
End: 2023-12-20
Payer: COMMERCIAL

## 2023-12-20 VITALS
WEIGHT: 233.2 LBS | HEART RATE: 108 BPM | SYSTOLIC BLOOD PRESSURE: 139 MMHG | OXYGEN SATURATION: 94 % | TEMPERATURE: 97.9 F | BODY MASS INDEX: 33.39 KG/M2 | HEIGHT: 70 IN | RESPIRATION RATE: 16 BRPM | DIASTOLIC BLOOD PRESSURE: 87 MMHG

## 2023-12-20 DIAGNOSIS — F41.1 GENERALIZED ANXIETY DISORDER: ICD-10-CM

## 2023-12-20 DIAGNOSIS — Z13.6 CARDIOVASCULAR SCREENING; LDL GOAL LESS THAN 160: ICD-10-CM

## 2023-12-20 DIAGNOSIS — Z00.00 ROUTINE GENERAL MEDICAL EXAMINATION AT A HEALTH CARE FACILITY: Primary | ICD-10-CM

## 2023-12-20 DIAGNOSIS — Z13.1 SCREENING FOR DIABETES MELLITUS: ICD-10-CM

## 2023-12-20 DIAGNOSIS — G47.33 OSA (OBSTRUCTIVE SLEEP APNEA): ICD-10-CM

## 2023-12-20 LAB — HBA1C MFR BLD: 6.2 % (ref 0–5.6)

## 2023-12-20 PROCEDURE — 83036 HEMOGLOBIN GLYCOSYLATED A1C: CPT | Performed by: NURSE PRACTITIONER

## 2023-12-20 PROCEDURE — 80061 LIPID PANEL: CPT | Performed by: NURSE PRACTITIONER

## 2023-12-20 PROCEDURE — 99396 PREV VISIT EST AGE 40-64: CPT | Performed by: NURSE PRACTITIONER

## 2023-12-20 PROCEDURE — 36415 COLL VENOUS BLD VENIPUNCTURE: CPT | Performed by: NURSE PRACTITIONER

## 2023-12-20 PROCEDURE — 80048 BASIC METABOLIC PNL TOTAL CA: CPT | Performed by: NURSE PRACTITIONER

## 2023-12-20 PROCEDURE — 83721 ASSAY OF BLOOD LIPOPROTEIN: CPT | Mod: 59 | Performed by: NURSE PRACTITIONER

## 2023-12-20 ASSESSMENT — ENCOUNTER SYMPTOMS
HEMATURIA: 0
SHORTNESS OF BREATH: 0
CONSTIPATION: 0
FEVER: 0
MYALGIAS: 0
ARTHRALGIAS: 0
COUGH: 0
JOINT SWELLING: 0
PALPITATIONS: 0
NAUSEA: 0
WEAKNESS: 0
SORE THROAT: 0
CHILLS: 0
DIARRHEA: 0
FREQUENCY: 0
HEMATOCHEZIA: 0
DYSURIA: 0
HEARTBURN: 0
ABDOMINAL PAIN: 0
HEADACHES: 0
DIZZINESS: 0
EYE PAIN: 0
NERVOUS/ANXIOUS: 0
PARESTHESIAS: 0

## 2023-12-20 ASSESSMENT — PAIN SCALES - GENERAL: PAINLEVEL: NO PAIN (0)

## 2023-12-20 NOTE — PROGRESS NOTES
SUBJECTIVE:   Faby is a 58 year old, presenting for the following:  Physical        12/20/2023     3:42 PM   Additional Questions   Roomed by Alyssa LOREDO   Accompanied by self       Patient here for yearly preventative care visit. In addition, they have the following concerns:    1. No concerns    Exercise: home physical therapy for ankle    Diet: no special diets or restrictions. 1-1.5 sodas a day.       Healthy Habits:     Getting at least 3 servings of Calcium per day:  Yes    Bi-annual eye exam:  Yes    Dental care twice a year:  Yes    Sleep apnea or symptoms of sleep apnea:  Sleep apnea    Diet:  Regular (no restrictions)    Frequency of exercise:  1 day/week    Duration of exercise:  Less than 15 minutes    Taking medications regularly:  Yes    Medication side effects:  Not applicable and None    Additional concerns today:  No    Social History     Tobacco Use    Smoking status: Never    Smokeless tobacco: Never   Substance Use Topics    Alcohol use: Yes     Comment: Very little           12/20/2023    12:58 PM   Alcohol Use   Prescreen: >3 drinks/day or >7 drinks/week? Not Applicable       Last PSA:   PSA   Date Value Ref Range Status   01/29/2015 0.87 0 - 4 ug/L Final       Reviewed orders with patient. Reviewed health maintenance and updated orders accordingly - Yes  Lab work is in process  Labs reviewed in EPIC  BP Readings from Last 3 Encounters:   12/20/23 139/87   11/22/23 117/78   11/01/23 130/80    Wt Readings from Last 3 Encounters:   12/20/23 105.8 kg (233 lb 3.2 oz)   11/22/23 104.3 kg (230 lb)   11/10/23 104.3 kg (230 lb)                   12/27/2021     4:45 PM 7/27/2022    11:48 AM 11/1/2023    10:00 AM   MELIDA-7 SCORE   Total Score 4 (minimal anxiety)     Total Score 4 3 3         6/2/2021    10:57 AM 12/27/2021     4:45 PM 7/27/2022    11:48 AM   PHQ   PHQ-9 Total Score 11 3 4   Q9: Thoughts of better off dead/self-harm past 2 weeks Not at all Not at all Not at all              Reviewed and updated  "as needed this visit by clinical staff   Tobacco  Allergies  Meds  Problems             Reviewed and updated as needed this visit by Provider    Allergies  Meds  Problems                Review of Systems   Constitutional:  Negative for chills and fever.   HENT:  Negative for congestion, ear pain, hearing loss and sore throat.    Eyes:  Negative for pain and visual disturbance.   Respiratory:  Negative for cough and shortness of breath.    Cardiovascular:  Negative for chest pain, palpitations and peripheral edema.   Gastrointestinal:  Negative for abdominal pain, constipation, diarrhea, heartburn, hematochezia and nausea.   Genitourinary:  Negative for dysuria, frequency, genital sores, hematuria, impotence, penile discharge and urgency.   Musculoskeletal:  Negative for arthralgias, joint swelling and myalgias.   Skin:  Negative for rash.   Neurological:  Negative for dizziness, weakness, headaches and paresthesias.   Psychiatric/Behavioral:  Negative for mood changes. The patient is not nervous/anxious.          OBJECTIVE:   /87   Pulse 108   Temp 97.9  F (36.6  C) (Tympanic)   Resp 16   Ht 1.778 m (5' 10\")   Wt 105.8 kg (233 lb 3.2 oz)   SpO2 94%   BMI 33.46 kg/m      Physical Exam  Constitutional:       Appearance: Normal appearance. He is not ill-appearing.   HENT:      Right Ear: Tympanic membrane, ear canal and external ear normal.      Left Ear: Tympanic membrane, ear canal and external ear normal.      Nose: Nose normal.      Mouth/Throat:      Mouth: Mucous membranes are moist.      Pharynx: Oropharynx is clear. No oropharyngeal exudate.   Eyes:      Extraocular Movements: Extraocular movements intact.      Pupils: Pupils are equal, round, and reactive to light.   Cardiovascular:      Rate and Rhythm: Normal rate and regular rhythm.      Pulses: Normal pulses.      Heart sounds: Normal heart sounds. No murmur heard.     No friction rub. No gallop.   Pulmonary:      Effort: Pulmonary " "effort is normal.      Breath sounds: Normal breath sounds. No wheezing, rhonchi or rales.   Abdominal:      General: Abdomen is flat. Bowel sounds are normal.      Palpations: Abdomen is soft.   Musculoskeletal:         General: Normal range of motion.      Cervical back: Normal range of motion and neck supple.   Lymphadenopathy:      Cervical: No cervical adenopathy.   Skin:     General: Skin is warm and dry.      Comments: Hands very dry, cracked, with some fissuring.    Neurological:      General: No focal deficit present.      Mental Status: He is alert and oriented to person, place, and time.   Psychiatric:         Mood and Affect: Mood normal.         Behavior: Behavior normal.         Thought Content: Thought content normal.         Judgment: Judgment normal.           Diagnostic Test Results:  Labs reviewed in Epic  Results for orders placed or performed in visit on 12/20/23 (from the past 24 hour(s))   Hemoglobin A1c   Result Value Ref Range    Hemoglobin A1C 6.2 (H) 0.0 - 5.6 %       ASSESSMENT/PLAN:   (Z00.00) Routine general medical examination at a health care facility  (primary encounter diagnosis)  Comment:   Plan: PRIMARY CARE FOLLOW-UP SCHEDULING    (F41.1) Generalized anxiety disorder  Comment: stable  Plan: continue fluoxetine    (G47.33) ALLAN (obstructive sleep apnea)  Comment: following with sleep medicine, on cpap      (Z13.1) Screening for diabetes mellitus    Plan: Basic metabolic panel, Hemoglobin A1c          (Z13.6) CARDIOVASCULAR SCREENING; LDL GOAL LESS THAN 160    Plan: Lipid panel reflex to direct LDL Non-fasting                  COUNSELING:   Reviewed preventive health counseling, as reflected in patient instructions       Regular exercise       Healthy diet/nutrition      BMI:   Estimated body mass index is 33.46 kg/m  as calculated from the following:    Height as of this encounter: 1.778 m (5' 10\").    Weight as of this encounter: 105.8 kg (233 lb 3.2 oz).   Weight management plan: " Discussed healthy diet and exercise guidelines      He reports that he has never smoked. He has never used smokeless tobacco.            WILLIAM Solis CNP  [facility]

## 2023-12-21 LAB
ANION GAP SERPL CALCULATED.3IONS-SCNC: 9 MMOL/L (ref 7–15)
BUN SERPL-MCNC: 21.2 MG/DL (ref 8–23)
CALCIUM SERPL-MCNC: 9.8 MG/DL (ref 8.8–10.2)
CHLORIDE SERPL-SCNC: 104 MMOL/L (ref 98–107)
CHOLEST SERPL-MCNC: 280 MG/DL
CREAT SERPL-MCNC: 1.22 MG/DL (ref 0.67–1.17)
DEPRECATED HCO3 PLAS-SCNC: 29 MMOL/L (ref 22–29)
EGFRCR SERPLBLD CKD-EPI 2021: 68 ML/MIN/1.73M2
FASTING STATUS PATIENT QL REPORTED: NO
GLUCOSE SERPL-MCNC: 90 MG/DL (ref 70–99)
HDLC SERPL-MCNC: 40 MG/DL
LDLC SERPL CALC-MCNC: ABNORMAL MG/DL
LDLC SERPL DIRECT ASSAY-MCNC: 186 MG/DL
NONHDLC SERPL-MCNC: 240 MG/DL
POTASSIUM SERPL-SCNC: 4.5 MMOL/L (ref 3.4–5.3)
SODIUM SERPL-SCNC: 142 MMOL/L (ref 135–145)
TRIGL SERPL-MCNC: 412 MG/DL

## 2023-12-22 ENCOUNTER — MYC MEDICAL ADVICE (OUTPATIENT)
Dept: FAMILY MEDICINE | Facility: CLINIC | Age: 58
End: 2023-12-22
Payer: COMMERCIAL

## 2023-12-22 DIAGNOSIS — E78.5 HYPERLIPIDEMIA LDL GOAL <100: Primary | ICD-10-CM

## 2023-12-22 RX ORDER — SIMVASTATIN 20 MG
20 TABLET ORAL AT BEDTIME
Qty: 90 TABLET | Refills: 3 | Status: SHIPPED | OUTPATIENT
Start: 2023-12-22 | End: 2024-12-16

## 2023-12-22 NOTE — TELEPHONE ENCOUNTER
1. Hyperlipidemia LDL goal <100  Start:  - simvastatin (ZOCOR) 20 MG tablet; Take 1 tablet (20 mg) by mouth at bedtime for 360 days  Dispense: 90 tablet; Refill: 3

## 2024-01-11 DIAGNOSIS — F41.1 GAD (GENERALIZED ANXIETY DISORDER): ICD-10-CM

## 2024-04-08 PROBLEM — M25.561 CHRONIC PAIN OF RIGHT KNEE: Status: RESOLVED | Noted: 2023-11-27 | Resolved: 2024-04-08

## 2024-04-08 PROBLEM — M25.571 PAIN IN JOINT INVOLVING ANKLE AND FOOT, RIGHT: Status: RESOLVED | Noted: 2023-11-27 | Resolved: 2024-04-08

## 2024-04-08 PROBLEM — G89.29 CHRONIC PAIN OF RIGHT KNEE: Status: RESOLVED | Noted: 2023-11-27 | Resolved: 2024-04-08

## 2024-04-08 NOTE — PROGRESS NOTES
Patient is discharged from PT.  Please refer to SOAP note dated 12/12/23 for last known functional status as well as final objective/subjective values.

## 2024-04-29 ASSESSMENT — SLEEP AND FATIGUE QUESTIONNAIRES
HOW LIKELY ARE YOU TO NOD OFF OR FALL ASLEEP WHEN YOU ARE A PASSENGER IN A CAR FOR AN HOUR WITHOUT A BREAK: MODERATE CHANCE OF DOZING
HOW LIKELY ARE YOU TO NOD OFF OR FALL ASLEEP WHILE SITTING AND TALKING TO SOMEONE: WOULD NEVER DOZE
HOW LIKELY ARE YOU TO NOD OFF OR FALL ASLEEP IN A CAR, WHILE STOPPED FOR A FEW MINUTES IN TRAFFIC: WOULD NEVER DOZE
HOW LIKELY ARE YOU TO NOD OFF OR FALL ASLEEP WHILE LYING DOWN TO REST IN THE AFTERNOON WHEN CIRCUMSTANCES PERMIT: MODERATE CHANCE OF DOZING
HOW LIKELY ARE YOU TO NOD OFF OR FALL ASLEEP WHILE SITTING QUIETLY AFTER LUNCH WITHOUT ALCOHOL: SLIGHT CHANCE OF DOZING
HOW LIKELY ARE YOU TO NOD OFF OR FALL ASLEEP WHILE SITTING INACTIVE IN A PUBLIC PLACE: SLIGHT CHANCE OF DOZING
HOW LIKELY ARE YOU TO NOD OFF OR FALL ASLEEP WHILE SITTING AND READING: SLIGHT CHANCE OF DOZING
HOW LIKELY ARE YOU TO NOD OFF OR FALL ASLEEP WHILE WATCHING TV: SLIGHT CHANCE OF DOZING

## 2024-04-30 ENCOUNTER — MYC MEDICAL ADVICE (OUTPATIENT)
Dept: FAMILY MEDICINE | Facility: CLINIC | Age: 59
End: 2024-04-30
Payer: COMMERCIAL

## 2024-05-02 ENCOUNTER — VIRTUAL VISIT (OUTPATIENT)
Dept: SLEEP MEDICINE | Facility: CLINIC | Age: 59
End: 2024-05-02
Payer: COMMERCIAL

## 2024-05-02 VITALS — HEIGHT: 71 IN | WEIGHT: 230 LBS | BODY MASS INDEX: 32.2 KG/M2

## 2024-05-02 DIAGNOSIS — G47.33 OSA (OBSTRUCTIVE SLEEP APNEA): Primary | ICD-10-CM

## 2024-05-02 DIAGNOSIS — F41.1 GENERALIZED ANXIETY DISORDER: ICD-10-CM

## 2024-05-02 DIAGNOSIS — E66.09 CLASS 1 OBESITY DUE TO EXCESS CALORIES WITHOUT SERIOUS COMORBIDITY WITH BODY MASS INDEX (BMI) OF 32.0 TO 32.9 IN ADULT: ICD-10-CM

## 2024-05-02 DIAGNOSIS — E66.811 CLASS 1 OBESITY DUE TO EXCESS CALORIES WITHOUT SERIOUS COMORBIDITY WITH BODY MASS INDEX (BMI) OF 32.0 TO 32.9 IN ADULT: ICD-10-CM

## 2024-05-02 PROCEDURE — 99213 OFFICE O/P EST LOW 20 MIN: CPT | Mod: 95 | Performed by: NURSE PRACTITIONER

## 2024-05-02 ASSESSMENT — PAIN SCALES - GENERAL: PAINLEVEL: MODERATE PAIN (5)

## 2024-05-02 NOTE — NURSING NOTE
Is the patient currently in the state of [place]? YES    Visit mode:VIDEO    If the visit is dropped, the patient can be reconnected by: VIDEO VISIT: Send to e-mail at: [email or web address]    Will anyone else be joining the visit? NO  (If patient encounters technical issues they should call [phone]    How would you like to obtain your AVS? MyChart    Are changes needed to the allergy or medication list? Pt stated no changes to allergies and Pt stated no med changes    Are refills needed on medications prescribed by this physician? NO  Has patient had flu shot for current/most recent flu season? If so, when? Yes: 11/01/2023    Reason for visit: MARLYN RICHMONDF

## 2024-05-02 NOTE — PROGRESS NOTES
Obstructive Sleep Apnea - PAP Follow-Up Visit:    Chief Complaint   Patient presents with    RECHECK       Luisito Johnston comes in today for follow-up of their severe sleep apnea, managed with CPAP.  Faby presents today for an evaluation of efficacy and compliance.  His last visit to the sleep medicine center took place on 10/31/2023 at which time he received authorization for replacement CPAP machine.  He was last seen by WILLIAM Valero, CNP at which time she authorized new supplies and equipment as well as adjusted settings from 5-15 cm H2O to 8-15 cm H2O in an effort to eliminate air hunger.  Patient did not describe any of those symptoms today.    Luisito Johnston has a past medical history notable for severe obstructive sleep apnea, REM sleep behavior disorder, generalized anxiety disorder, chronic sinusitis, hyperlipidemia, class II obesity, and depressive disorder.    Do you use a CPAP Machine at home: Yes  Overall, on a scale of 0-10 how would you rate your CPAP (0 poor, 10 great): 10  Is your mask comfortable: Yes  If not, why:    Is you mask leaking: No  If yes, where do you feel it:    How many night per week does the mask leak (0-7):    Do you notice snoring with mask on: No  Do you notice gasping arousals with mask on: No  Are you having significant oral or nasal dryness: No  Is the pressure setting comfortable: Yes  In not, why:    What type of mask do you use: Nasal Pillow  What is your typical bedtime: 11 pm  How long does it take you to go to sleep on PAP therapy: 5 minutes  What time do you typically get out of bed for the day: 8 am  How many hours on average per night are you using PAP therapy: 8  How many hours are you sleeping per night: 8  Do you feel well rested in the morning: Yes    EPWORTH SLEEPINESS SCALE         4/29/2024    10:19 AM    [place] Sleepiness Scale ( KEVIN Cronin  [id number]<br>ESS - [place]/English - Final version - 21 Nov 07 - [facility] [place].)   Sitting and reading  Slight chance of dozing   Watching TV Slight chance of dozing   Sitting, inactive in a public place (e.g. a theatre or a meeting) Slight chance of dozing   As a passenger in a car for an hour without a break Moderate chance of dozing   Lying down to rest in the afternoon when circumstances permit Moderate chance of dozing   Sitting and talking to someone Would never doze   Sitting quietly after a lunch without alcohol Slight chance of dozing   In a car, while stopped for a few minutes in traffic Would never doze   Isanti Score (MC) 8   Isanti Score (Sleep) 8       INSOMNIA SEVERITY INDEX (FABIO)          4/29/2024    10:16 AM   Insomnia Severity Index (FABIO)   Difficulty falling asleep 0   Difficulty staying asleep 1   Problems waking up too early 0   How SATISFIED/DISSATISFIED are you with your CURRENT sleep pattern? 0   How NOTICEABLE to others do you think your sleep problem is in terms of impairing the quality of your life? 0   How WORRIED/DISTRESSED are you about your current sleep problem? 0   To what extent do you consider your sleep problem to INTERFERE with your daily functioning (e.g. daytime fatigue, mood, ability to function at work/daily chores, concentration, memory, mood, etc.) CURRENTLY? 1   FABIO Total Score 2       Guidelines for Scoring/Interpretation:  Total score categories:  0-7 = No clinically significant insomnia   8-14 = Subthreshold insomnia   15-21 = Clinical insomnia (moderate severity)  22-28 = Clinical insomnia (severe)  Used via courtesy of www.YooDeal.va.gov with permission from Wally Beckman PhD., [facility]    [employer]Med   Auto-PAP 8-15 cmH2O 30 day usage data: 3/29/2024-4/27/2024  100% of days with > 4 hours of use.  0/30 days with no use.   Average use 7 hours, 42 minutes per day.   95%ile Leak 15.5 l/min.   CPAP 95% pressure 10.8 cm.   AHI 0.6 events per hour.         Past Sleep Evaluations: HST on 02/06/2018 at 102 kg (BMI 31)- severe supine predominant ALLAN with some sleep  "associated hypoxemia (AHI was 60.4 events per hour and the patient spent 9.4 minutes under the SpO2 of 88%).     Patient and I reviewed his past sleep study as well as data downloaded from his CPAP machine for the past 4 weeks.  He has done a great job using his new equipment.        Reviewed by team:  Tobacco  Allergies  Meds  Problems           Reviewed by provider:   Allergies  Meds  Problems              Problem List:  Patient Active Problem List    Diagnosis Date Noted    Advanced directives, counseling/discussion 06/02/2021     Priority: Medium     Pt was given info on ADVDinah Tobar MA      ALLAN (obstructive sleep apnea) 04/04/2018     Priority: Medium     HST on 02/06/2018 at 102 kg (BMI 31)- severe supine predominant ALLAN with some sleep associated hypoxemia (AHI was 60.4 events per hour and the patient spent 9.4 minutes under the SpO2 of 88%).       Generalized anxiety disorder 04/17/2015     Priority: Medium    CARDIOVASCULAR SCREENING; LDL GOAL LESS THAN 160 10/31/2010     Priority: Medium        Ht 1.803 m (5' 11\")   Wt 104.3 kg (230 lb)   BMI 32.08 kg/m      Impression/Plan:    ICD-10-CM    1. ALLNA (obstructive sleep apnea)  G47.33 Sleep Comprehensive DME      2. Class 1 obesity due to excess calories without serious comorbidity with body mass index (BMI) of 32.0 to 32.9 in adult  E66.09 Sleep Comprehensive [facility]    Z68.32       3. Generalized anxiety disorder  F41.1 Sleep Comprehensive DME      Luisito Johnston admits that he uses his CPAP on a nightly basis and attempts to do so for the entire duration of his sleep.  He denies any symptoms commonly associated with obstructive sleep apnea such as daytime sleepiness and intrusion of sleepiness into his driving ability.  A comprehensive order for needed supplies and equipment was placed today.   Recommend that he optimize his sleep schedule as well as his sleep hygiene practices to mitigate any further sleep disruption  Recommend that he employ safe " driving practices such as not driving a motor vehicle should he become drowsy.  Recommend BMI of 30.0 or less  And recommend that Luisito follow-up in the sleep medicine clinic in 1 year, sooner if needed.    20 minutes spent with patient, all of which were spent face-to-face counseling, consulting, coordinating plan of care.      WILLIAM Oro CNP  Sleep Medicine    This note was written with the assistance of the Dragon voice-dictation technology software. The final document, although reviewed, may contain errors. For corrections, please contact the office.    CC:  Red Trejo,

## 2024-05-02 NOTE — PROGRESS NOTES
Virtual Visit Details    Type of service:  Video Visit 2:00 PM-2:20 PM    Originating Location (pt. Location): Home    Distant Location (provider location):  Off-site  Platform used for Video Visit: Trevin

## 2024-05-03 PROBLEM — E66.811 CLASS 1 OBESITY DUE TO EXCESS CALORIES WITHOUT SERIOUS COMORBIDITY WITH BODY MASS INDEX (BMI) OF 32.0 TO 32.9 IN ADULT: Status: ACTIVE | Noted: 2024-05-03

## 2024-05-03 PROBLEM — E66.09 CLASS 1 OBESITY DUE TO EXCESS CALORIES WITHOUT SERIOUS COMORBIDITY WITH BODY MASS INDEX (BMI) OF 32.0 TO 32.9 IN ADULT: Status: ACTIVE | Noted: 2024-05-03

## 2024-05-03 NOTE — PATIENT INSTRUCTIONS
Your Body mass index is 32.08 kg/m .  Weight management is a personal decision.  If you are interested in exploring weight loss strategies, the following discussion covers the approaches that may be successful. Body mass index (BMI) is one way to tell whether you are at a healthy weight, overweight, or obese. It measures your weight in relation to your height.  A BMI of 18.5 to 24.9 is in the healthy range. A person with a BMI of 25 to 29.9 is considered overweight, and someone with a BMI of 30 or greater is considered obese. More than two-thirds of American adults are considered overweight or obese.  Being overweight or obese increases the risk for further weight gain. Excess weight may lead to heart disease and diabetes.  Creating and following plans for healthy eating and physical activity may help you improve your health.  Weight control is part of healthy lifestyle and includes exercise, emotional health, and healthy eating habits. Careful eating habits lifelong are the mainstay of weight control. Though there are significant health benefits from weight loss, long-term weight loss with diet alone may be very difficult to achieve- studies show long-term success with dietary management in less than 10% of people. Attaining a healthy weight may be especially difficult to achieve in those with severe obesity. In some cases, medications, devices and surgical management might be considered.  What can you do?  If you are overweight or obese and are interested in methods for weight loss, you should discuss this with your provider.   Consider reducing daily calorie intake by 500 calories.   Keep a food journal.   Avoiding skipping meals, consider cutting portions instead.    Diet combined with exercise helps maintain muscle while optimizing fat loss. Strength training is particularly important for building and maintaining muscle mass. Exercise helps reduce stress, increase energy, and improves fitness. Increasing  exercise without diet control, however, may not burn enough calories to loose weight.     Start walking three days a week 10-20 minutes at a time  Work towards walking thirty minutes five days a week   Eventually, increase the speed of your walking for 1-2 minutes at time    In addition, we recommend that you review healthy lifestyles and methods for weight loss available through the National Institutes of Health patient information sites:  http://win.niddk.nih.gov/publications/index.htm    And look into health and wellness programs that may be available through your health insurance provider, employer, local community center, or [employer].

## 2024-06-17 PROBLEM — Z71.89 ADVANCED DIRECTIVES, COUNSELING/DISCUSSION: Status: RESOLVED | Noted: 2021-06-02 | Resolved: 2024-06-17

## 2024-09-12 NOTE — PROGRESS NOTES
ASSESSMENT & PLAN    Pat was seen today for follow up.    Diagnoses and all orders for this visit:    Achilles tendinitis of right lower extremity  -     Orthopedic  Referral; Future      See Patient Instructions  Patient Instructions   With ongoing symptoms from right Achilles tendinopathy, plan referral to you with one of my colleagues, for discussion of PRP versus tenotomy with Tenex.  Try to keep up with comfortable home exercises from physical therapy, if able.  We discussed potential for topical treatment with Voltaren (diclofenac) gel, which is available over-the-counter.  Also provided updated disability parking pass.    If you have any further questions for your physician or physician s care team you can contact them thru [facility]EC[facility] or by calling [phone].      [facility]    SUBJECTIVE- Interim History September 12, 2024    Chief Complaint   Patient presents with    Right Ankle - Follow Up       Luisito Johnston is a 59 year old male who is seen in f/u up for right Achilles tendinopathy. Since last visit on 11/10/23 patient has waxing and waning pain. He reports some improvement with physical therapy, but pain still comes and goes. The pain can be severe enough to cause difficulty walking. He reports occasional swelling. He is taking ibuprofen and icing as needed. The patient is also requesting an extended handicapped parking application as he is having difficulty with prolonged walking.    The patient is seen by themselves.    Orthopedic/Surgical history: NO  Social History/Occupation: sales, gardening/ landscaping    **  Above information per rooming staff.  Additional history:  Symptoms can wax/wane; generally present, but sometimes increased, sometimes decreased.  Recently had sense of increased pain, almost ripped sensation when pushing down with right foot while outdoors. Now improved a bit again.  Still walking a lot for  "nighttime job. Long standing, ~3 hours.  PT was of benefit. PT notes reviewed.        REVIEW OF SYSTEMS:  Review of Systems    OBJECTIVE:  Ht 1.778 m (5' 10\")   BMI 33.00 kg/m         Visible and palpable prominence with tenderness again noted in the mid achilles, as seen previously  Pain with active PF      RADIOLOGY:  Previous MRI:      EXAM: MR ANKLE RIGHT W/O CONTRAST  LOCATION: [facility]  DATE/TIME: 5/11/2023 4:49 PM CDT     INDICATION: Right Achilles tenderness and swelling.  COMPARISON: None.  TECHNIQUE: Unenhanced.     FINDINGS:      TENDONS:   -Peroneal: Peroneus longus and brevis tendons are intact. No tendinopathy or tenosynovitis. No subluxation.  -Medial: Posterior tibialis tendon is intact. No tendinopathy or tenosynovitis. Flexor digitorum longus and flexor hallucis longus tendons are normal. No tenosynovitis.  -Anterior: Anterior tibialis, extensor hallucis longus, and extensor digitorum longus tendons are normal. No tenosynovitis.  -Achilles: Moderate fusiform dilatation of the midportion of the Achilles tendon compatible with tendinopathy without significant superimposed tearing. These changes correspond to the area of maximal discomfort as delineated by a soft tissue marker.   There is mild adjacent paratenonitis.     LIGAMENTS:   -Anterior talofibular ligament: Intact.   -Calcaneofibular ligament: Intact.   -Posterior talofibular ligament: Intact.  -Syndesmotic inferior tibiofibular ligaments: Intact.  -Deltoid ligament complex: Intact.  -Spring ligament complex: Intact.     JOINTS AND BONES:   -No fracture, bone contusion or osteochondral lesion. Normal articular cartilage. No joint effusion or synovitis.     SOFT TISSUES:  -Plantar fascia: Intact. No acute fasciitis or tear.  -Sinus tarsi and tarsal tunnel: Normal.  -Muscles: Normal.                                                                      IMPRESSION:  1.  Moderate noninsertional tendinosis of the Achilles " tendon with mild adjacent paratenonitis. No superimposed tearing.     2.  Otherwise normal MRI of the right ankle.

## 2024-09-13 ENCOUNTER — OFFICE VISIT (OUTPATIENT)
Dept: ORTHOPEDICS | Facility: CLINIC | Age: 59
End: 2024-09-13
Payer: COMMERCIAL

## 2024-09-13 VITALS — HEIGHT: 70 IN | BODY MASS INDEX: 33 KG/M2

## 2024-09-13 DIAGNOSIS — M76.61 ACHILLES TENDINITIS OF RIGHT LOWER EXTREMITY: Primary | ICD-10-CM

## 2024-09-13 PROCEDURE — 99213 OFFICE O/P EST LOW 20 MIN: CPT | Performed by: PEDIATRICS

## 2024-09-13 NOTE — PATIENT INSTRUCTIONS
With ongoing symptoms from right Achilles tendinopathy, plan referral to you with one of my colleagues, for discussion of PRP versus tenotomy with Tenex.  Try to keep up with comfortable home exercises from physical therapy, if able.  We discussed potential for topical treatment with Voltaren (diclofenac) gel, which is available over-the-counter.  Also provided updated disability parking pass.    If you have any further questions for your physician or physician s care team you can contact them thru [employer] or by calling [phone].

## 2024-09-13 NOTE — LETTER
9/13/2024      Luistio Johnston  [address]  [facility] [address]      Dear Colleague,    Thank you for referring your patient, Luisito Johnston, to the [facility]. Please see a copy of my visit note below.    ASSESSMENT & PLAN    Pat was seen today for follow up.    Diagnoses and all orders for this visit:    Achilles tendinitis of right lower extremity  -     Orthopedic  Referral; Future      See Patient Instructions  Patient Instructions   With ongoing symptoms from right Achilles tendinopathy, plan referral to you with one of my colleagues, for discussion of PRP versus tenotomy with Tenex.  Try to keep up with comfortable home exercises from physical therapy, if able.  We discussed potential for topical treatment with Voltaren (diclofenac) gel, which is available over-the-counter.  Also provided updated disability parking pass.    If you have any further questions for your physician or physician s care team you can contact them thru [employer] or by calling [phone].      Robert Bullock,   [facility]    SUBJECTIVE- Interim History September 12, 2024    Chief Complaint   Patient presents with     Right Ankle - Follow Up       Luisito Johnston is a 59 year old male who is seen in f/u up for right Achilles tendinopathy. Since last visit on 11/10/23 patient has waxing and waning pain. He reports some improvement with physical therapy, but pain still comes and goes. The pain can be severe enough to cause difficulty walking. He reports occasional swelling. He is taking ibuprofen and icing as needed. The patient is also requesting an extended handicapped parking application as he is having difficulty with prolonged walking.    The patient is seen by themselves.    Orthopedic/Surgical history: NO  Social History/Occupation: sales, gardening/ landscaping    **  Above information per rooming staff.  Additional  "history:  Symptoms can wax/wane; generally present, but sometimes increased, sometimes decreased.  Recently had sense of increased pain, almost ripped sensation when pushing down with right foot while outdoors. Now improved a bit again.  Still walking a lot for nighttime job. Long standing, ~3 hours.  PT was of benefit. PT notes reviewed.        REVIEW OF SYSTEMS:  Review of Systems    OBJECTIVE:  Ht 1.778 m (5' 10\")   BMI 33.00 kg/m         Visible and palpable prominence with tenderness again noted in the mid achilles, as seen previously  Pain with active PF      RADIOLOGY:  Previous MRI:      EXAM: MR ANKLE RIGHT W/O CONTRAST  LOCATION: [facility]  DATE/TIME: 5/11/2023 4:49 PM CDT     INDICATION: Right Achilles tenderness and swelling.  COMPARISON: None.  TECHNIQUE: Unenhanced.     FINDINGS:      TENDONS:   -Peroneal: Peroneus longus and brevis tendons are intact. No tendinopathy or tenosynovitis. No subluxation.  -Medial: Posterior tibialis tendon is intact. No tendinopathy or tenosynovitis. Flexor digitorum longus and flexor hallucis longus tendons are normal. No tenosynovitis.  -Anterior: Anterior tibialis, extensor hallucis longus, and extensor digitorum longus tendons are normal. No tenosynovitis.  -Achilles: Moderate fusiform dilatation of the midportion of the Achilles tendon compatible with tendinopathy without significant superimposed tearing. These changes correspond to the area of maximal discomfort as delineated by a soft tissue marker.   There is mild adjacent paratenonitis.     LIGAMENTS:   -Anterior talofibular ligament: Intact.   -Calcaneofibular ligament: Intact.   -Posterior talofibular ligament: Intact.  -Syndesmotic inferior tibiofibular ligaments: Intact.  -Deltoid ligament complex: Intact.  -Spring ligament complex: Intact.     JOINTS AND BONES:   -No fracture, bone contusion or osteochondral lesion. Normal articular cartilage. No joint effusion or synovitis.     SOFT " TISSUES:  -Plantar fascia: Intact. No acute fasciitis or tear.  -Sinus tarsi and tarsal tunnel: Normal.  -Muscles: Normal.                                                                      IMPRESSION:  1.  Moderate noninsertional tendinosis of the Achilles tendon with mild adjacent paratenonitis. No superimposed tearing.     2.  Otherwise normal MRI of the right ankle.                        Again, thank you for allowing me to participate in the care of your patient.        Sincerely,        Robert Bullock DO

## 2024-09-16 ENCOUNTER — PATIENT OUTREACH (OUTPATIENT)
Dept: CARE COORDINATION | Facility: CLINIC | Age: 59
End: 2024-09-16
Payer: COMMERCIAL

## 2024-09-18 ENCOUNTER — PATIENT OUTREACH (OUTPATIENT)
Dept: CARE COORDINATION | Facility: CLINIC | Age: 59
End: 2024-09-18
Payer: COMMERCIAL

## 2024-10-16 ENCOUNTER — OFFICE VISIT (OUTPATIENT)
Dept: ORTHOPEDICS | Facility: CLINIC | Age: 59
End: 2024-10-16
Attending: PEDIATRICS
Payer: COMMERCIAL

## 2024-10-16 VITALS
DIASTOLIC BLOOD PRESSURE: 77 MMHG | SYSTOLIC BLOOD PRESSURE: 124 MMHG | HEIGHT: 70 IN | WEIGHT: 235 LBS | BODY MASS INDEX: 33.64 KG/M2

## 2024-10-16 DIAGNOSIS — M76.61 ACHILLES TENDINITIS OF RIGHT LOWER EXTREMITY: ICD-10-CM

## 2024-10-16 PROCEDURE — 99213 OFFICE O/P EST LOW 20 MIN: CPT | Performed by: FAMILY MEDICINE

## 2024-10-16 NOTE — PROGRESS NOTES
Luisito Johnston  :  1965  DOS: 10/16/2024  MRN: [id number]    Sports Medicine Clinic Visit    PCP: Red Trejo    Luisito Johnston is a 59 year old male who is seen in consultation at the request of  Robert Bullock D.O. presenting with chronic right achilles tendon pain.    Injury: Gradual onset of pain over the past 1+ years.  Pain located over right achilles tendon, achilles insertion, nonradiating.  Additional Features:  Positive: swelling and painful weight bearing.  Symptoms are better with Ice, Ibuprofen, and Rest.  Symptoms are worse with: prolonged standing/walking, ankle dorsiflexion, raising up on toes.  Other evaluation and/or treatments so far consists of: Ice, Heat, Tylenol, Ibuprofen, Rest, and HEP, stretching, alternative footwear.  Prior imaging: MRI completed 23.  Prior History of related problems: none    Social History: currently employed as sales    Review of Systems  Musculoskeletal: as above  Remainder of review of systems is negative including constitutional, CV, pulmonary, GI, Skin and Neurologic except as noted in HPI or medical history.    Past Medical History:   Diagnosis Date    Arthritis     Mother and Father    Chronic sinusitis     Polyposis    Depressive disorder Long time ago    Hyperlipidemia     Increased BMI     Psoriasis      Past Surgical History:   Procedure Laterality Date    ARTHROSCOPY KNEE Right 2017    Procedure: ARTHROSCOPY KNEE;  Arthroscopic Menisectomy Right Knee;  Surgeon: Jim Lemon MD;  Location: [facility]    COLONOSCOPY N/A 2015    Procedure: COLONOSCOPY;  Surgeon: Denys Archuleta MD;  Location: [facility]    COLONOSCOPY WITH CO2 INSUFFLATION N/A 2015    Procedure: COLONOSCOPY WITH CO2 INSUFFLATION;  Surgeon: Denys Archuleta MD;  Location: [facility]    ENT SURGERY      polyps from nasal passage    EYE SURGERY  11/15/2023     SHLDR ARTHROSCOP,PART ACROMIOPLAS  2000    SHOULDER SURGERY      SURGICAL  "HISTORY OF -   1991    Chronic Lt Shoulder Dislocation    TONSILLECTOMY       Family History   Problem Relation Age of Onset    Respiratory Mother     Diabetes Father     Sleep Apnea Sister     Sleep Apnea Sister     Anxiety Disorder Sister         Both sisters       Objective  /77   Ht 1.778 m (5' 10\")   Wt 106.6 kg (235 lb)   BMI 33.72 kg/m      General: healthy, alert and in no distress    HEENT: no scleral icterus or conjunctival erythema   Skin: no suspicious lesions or rash. No jaundice.   CV: regular rhythm by palpation, 2+ distal pulses, no pedal edema    Resp: normal respiratory effort without conversational dyspnea   Psych: normal mood and affect    Gait: mildly antalgic, appropriate coordination and balance   Neuro: normal light touch sensory exam of the extremities. Motor strength as noted below     Right Ankle/Foot Exam:    Inspection:       no visible ecchymosis       edema noted midsubstance achilles    Foot inspection:       no deformity noted    ROM:        full ROM with dorsiflexion, plantarflexion, inversion and eversion    Tender:       Midsubstance achilles    Non-Tender:       remainder of foot and ankle    Skin:       well perfused       capillary refill less than 2 seconds    Radiology:  EXAM: MR ANKLE RIGHT W/O CONTRAST  LOCATION: [facility]  DATE/TIME: 5/11/2023 4:49 PM CDT     INDICATION: Right Achilles tenderness and swelling.  COMPARISON: None.  TECHNIQUE: Unenhanced.     FINDINGS:      TENDONS:   -Peroneal: Peroneus longus and brevis tendons are intact. No tendinopathy or tenosynovitis. No subluxation.  -Medial: Posterior tibialis tendon is intact. No tendinopathy or tenosynovitis. Flexor digitorum longus and flexor hallucis longus tendons are normal. No tenosynovitis.  -Anterior: Anterior tibialis, extensor hallucis longus, and extensor digitorum longus tendons are normal. No tenosynovitis.  -Achilles: Moderate fusiform dilatation of the midportion of the " Achilles tendon compatible with tendinopathy without significant superimposed tearing. These changes correspond to the area of maximal discomfort as delineated by a soft tissue marker.   There is mild adjacent paratenonitis.     LIGAMENTS:   -Anterior talofibular ligament: Intact.   -Calcaneofibular ligament: Intact.   -Posterior talofibular ligament: Intact.  -Syndesmotic inferior tibiofibular ligaments: Intact.  -Deltoid ligament complex: Intact.  -Spring ligament complex: Intact.     JOINTS AND BONES:   -No fracture, bone contusion or osteochondral lesion. Normal articular cartilage. No joint effusion or synovitis.     SOFT TISSUES:  -Plantar fascia: Intact. No acute fasciitis or tear.  -Sinus tarsi and tarsal tunnel: Normal.  -Muscles: Normal.                                                                      IMPRESSION:  1.  Moderate noninsertional tendinosis of the Achilles tendon with mild adjacent paratenonitis. No superimposed tearing.     2.  Otherwise normal MRI of the right ankle.        Assessment:  1. Achilles tendinitis of right lower extremity        Plan:  Discussed the assessment with the patient.  Follow up: 2 weeks for PRP procedure  Has tried all manners of conservative care including HEP, activity modification, bracing, PT, massage, OTC medications without significant or lasting relief  Reviewed PRP and US guided percutaneous tenotomy in detail today  After discussion, he would like to trial PRP  Advised stopping all oral NSAIDs now, reviewed reasoning  Can provide walking boot on the day of the procedure as well as crutches if needed  Reviewed expectations, goals and relative risks of PRP, he expressed understanding  MR images independently visualized and reviewed with patient today in clinic  Home handouts provided and supportive care reviewed  All questions were answered today  Contact us with additional questions or concerns  Signs and sx of concern reviewed      Robert Alvarenga DO,  CAQ  Sports Medicine Physician  [facility]

## 2024-10-16 NOTE — LETTER
10/16/2024      Luisito Johnston  [address]  [facility] [address]      Dear Colleague,    Thank you for referring your patient, Luisito Johnston, to the [facility]. Please see a copy of my visit note below.    Luisito Johnston  :  1965  DOS: 10/16/2024  MRN: [id number]    Sports Medicine Clinic Visit    PCP: Red Trejo    Luisito Johnston is a 59 year old male who is seen in consultation at the request of  Robert Bullock D.O. presenting with chronic right achilles tendon pain.    Injury: Gradual onset of pain over the past 1+ years.  Pain located over right achilles tendon, achilles insertion, nonradiating.  Additional Features:  Positive: swelling and painful weight bearing.  Symptoms are better with Ice, Ibuprofen, and Rest.  Symptoms are worse with: prolonged standing/walking, ankle dorsiflexion, raising up on toes.  Other evaluation and/or treatments so far consists of: Ice, Heat, Tylenol, Ibuprofen, Rest, and HEP, stretching, alternative footwear.  Prior imaging: MRI completed 23.  Prior History of related problems: none    Social History: currently employed as sales    Review of Systems  Musculoskeletal: as above  Remainder of review of systems is negative including constitutional, CV, pulmonary, GI, Skin and Neurologic except as noted in HPI or medical history.    Past Medical History:   Diagnosis Date     Arthritis     Mother and Father     Chronic sinusitis     Polyposis     Depressive disorder Long time ago     Hyperlipidemia      Increased BMI      Psoriasis      Past Surgical History:   Procedure Laterality Date     ARTHROSCOPY KNEE Right 2017    Procedure: ARTHROSCOPY KNEE;  Arthroscopic Menisectomy Right Knee;  Surgeon: Jim Lemon MD;  Location: [facility]     COLONOSCOPY N/A 2015    Procedure: COLONOSCOPY;  Surgeon: Denys Archuleta MD;  Location: [facility]     COLONOSCOPY WITH CO2 INSUFFLATION N/A 2015  "   Procedure: COLONOSCOPY WITH CO2 INSUFFLATION;  Surgeon: Denys Archuleta MD;  Location: [facility]     ENT SURGERY      polyps from nasal passage     EYE SURGERY  11/15/2023      SHLDR ARTHROSCOP,PART ACROMIOPLAS  2000     SHOULDER SURGERY       SURGICAL HISTORY OF -   1991    Chronic Lt Shoulder Dislocation     TONSILLECTOMY       Family History   Problem Relation Age of Onset     Respiratory Mother      Diabetes Father      Sleep Apnea Sister      Sleep Apnea Sister      Anxiety Disorder Sister         Both sisters       Objective  /77   Ht 1.778 m (5' 10\")   Wt 106.6 kg (235 lb)   BMI 33.72 kg/m      General: healthy, alert and in no distress    HEENT: no scleral icterus or conjunctival erythema   Skin: no suspicious lesions or rash. No jaundice.   CV: regular rhythm by palpation, 2+ distal pulses, no pedal edema    Resp: normal respiratory effort without conversational dyspnea   Psych: normal mood and affect    Gait: mildly antalgic, appropriate coordination and balance   Neuro: normal light touch sensory exam of the extremities. Motor strength as noted below     Right Ankle/Foot Exam:    Inspection:       no visible ecchymosis       edema noted midsubstance achilles    Foot inspection:       no deformity noted    ROM:        full ROM with dorsiflexion, plantarflexion, inversion and eversion    Tender:       Midsubstance achilles    Non-Tender:       remainder of foot and ankle    Skin:       well perfused       capillary refill less than 2 seconds    Radiology:  EXAM: MR ANKLE RIGHT W/O CONTRAST  LOCATION: [facility]  DATE/TIME: 5/11/2023 4:49 PM CDT     INDICATION: Right Achilles tenderness and swelling.  COMPARISON: None.  TECHNIQUE: Unenhanced.     FINDINGS:      TENDONS:   -Peroneal: Peroneus longus and brevis tendons are intact. No tendinopathy or tenosynovitis. No subluxation.  -Medial: Posterior tibialis tendon is intact. No tendinopathy or tenosynovitis. Flexor " digitorum longus and flexor hallucis longus tendons are normal. No tenosynovitis.  -Anterior: Anterior tibialis, extensor hallucis longus, and extensor digitorum longus tendons are normal. No tenosynovitis.  -Achilles: Moderate fusiform dilatation of the midportion of the Achilles tendon compatible with tendinopathy without significant superimposed tearing. These changes correspond to the area of maximal discomfort as delineated by a soft tissue marker.   There is mild adjacent paratenonitis.     LIGAMENTS:   -Anterior talofibular ligament: Intact.   -Calcaneofibular ligament: Intact.   -Posterior talofibular ligament: Intact.  -Syndesmotic inferior tibiofibular ligaments: Intact.  -Deltoid ligament complex: Intact.  -Spring ligament complex: Intact.     JOINTS AND BONES:   -No fracture, bone contusion or osteochondral lesion. Normal articular cartilage. No joint effusion or synovitis.     SOFT TISSUES:  -Plantar fascia: Intact. No acute fasciitis or tear.  -Sinus tarsi and tarsal tunnel: Normal.  -Muscles: Normal.                                                                      IMPRESSION:  1.  Moderate noninsertional tendinosis of the Achilles tendon with mild adjacent paratenonitis. No superimposed tearing.     2.  Otherwise normal MRI of the right ankle.        Assessment:  1. Achilles tendinitis of right lower extremity        Plan:  Discussed the assessment with the patient.  Follow up: 2 weeks for PRP procedure  Has tried all manners of conservative care including HEP, activity modification, bracing, PT, massage, OTC medications without significant or lasting relief  Reviewed PRP and US guided percutaneous tenotomy in detail today  After discussion, he would like to trial PRP  Advised stopping all oral NSAIDs now, reviewed reasoning  Can provide walking boot on the day of the procedure as well as crutches if needed  Reviewed expectations, goals and relative risks of PRP, he expressed understanding  MR  images independently visualized and reviewed with patient today in clinic  Home handouts provided and supportive care reviewed  All questions were answered today  Contact us with additional questions or concerns  Signs and sx of concern reviewed      Robert Alvarenga DO, [facility]  Sports Medicine Physician  [facility]            Again, thank you for allowing me to participate in the care of your patient.        Sincerely,        Robert Alvarenga DO

## 2024-11-06 ENCOUNTER — OFFICE VISIT (OUTPATIENT)
Dept: ORTHOPEDICS | Facility: CLINIC | Age: 59
End: 2024-11-06

## 2024-11-06 VITALS — WEIGHT: 235 LBS | BODY MASS INDEX: 33.64 KG/M2 | HEIGHT: 70 IN

## 2024-11-06 DIAGNOSIS — M76.61 ACHILLES TENDINITIS OF RIGHT LOWER EXTREMITY: Primary | ICD-10-CM

## 2024-11-06 PROCEDURE — 0232T NJX PLATELET PLASMA: CPT | Performed by: FAMILY MEDICINE

## 2024-11-06 RX ORDER — HYDROCODONE BITARTRATE AND ACETAMINOPHEN 5; 325 MG/1; MG/1
1 TABLET ORAL EVERY 6 HOURS PRN
Qty: 8 TABLET | Refills: 0 | Status: SHIPPED | OUTPATIENT
Start: 2024-11-06 | End: 2024-11-09

## 2024-11-06 NOTE — LETTER
2024      Luisito Johnston  [address]  [facility] [address]      Dear Colleague,    Thank you for referring your patient, Luisito Johnston, to the [facility]. Please see a copy of my visit note below.    Luisito Johnston  :  1965  DOS: 2024  MRN: [id number]    Sports Medicine Clinic Procedure      Ultrasound Guided PRP Injection of the Right Achilles Tendon       Diagnosis:   1. Achilles tendinitis of right lower extremity         PRP PROCEDURE:  The patient was prepped and approximately 60 cc of whole blood was withdrawn using a standard sterile technique via antecubiatal access of the arm. The whole blood was processed on location in the [employer] [facility] and approximately 5 cc of Platelet Rich Plasma was obtained.      Technique: The risks of the procedure were explained to the patient.  A consent was signed for the achilles tendon PRP injection.  The patient was evaluated with a [employer] ultrasound machine using a 12 MHz linear probe.       The right achilles tendon/ankle was prepped and draped in a sterile manner.  Ultrasound identification of the achilles tendon in both long and short axis.  Area blood vessels noted. The probe was placed in a oblique-sagittal axis to the right lower leg.  1.5 ml's of 1% lidocaine were injected prior to the procedure for local anesthetic.  A 1.5 inch 22 gauge needle was placed under ultrasound guidance into the right achilles tendon.   A solution with total of volume of 5 cc PRP was injected into the right achilles tendon body in both long and short axis taking care to distribute the volume throughout all areas.  There was ultrasound documentation of needle placement and injection.        Impression:  Successful right achilles tendon PRP US-guided injection.      Plan:  Follow up prn   Will provide update in 1-2 weeks, plan follow up appointment in 6 weeks.  Discussed potential next steps based on  clinical progress  Advised will likely be sore over next 2-14 days, OTC and supportive care reviewed  Breakthrough pain medication provided, will use only if needed  All questions were answered today  Contact us with additional questions or concerns  Signs and sx of concern reviewed        Robert Alvarenga DO, ANGELES  Primary Care Sports Medicine  [facility]       Again, thank you for allowing me to participate in the care of your patient.        Sincerely,        Robert Alvarenga DO

## 2024-11-06 NOTE — PROGRESS NOTES
Luisito Johnston  :  1965  DOS: 2024  MRN: [id number]    Sports Medicine Clinic Procedure      Ultrasound Guided PRP Injection of the Right Achilles Tendon       Diagnosis:   1. Achilles tendinitis of right lower extremity         PRP PROCEDURE:  The patient was prepped and approximately 60 cc of whole blood was withdrawn using a standard sterile technique via antecubiatal access of the arm. The whole blood was processed on location in the [employer] [facility] and approximately 5 cc of Platelet Rich Plasma was obtained.      Technique: The risks of the procedure were explained to the patient.  A consent was signed for the achilles tendon PRP injection.  The patient was evaluated with a [employer] ultrasound machine using a 12 MHz linear probe.       The right achilles tendon/ankle was prepped and draped in a sterile manner.  Ultrasound identification of the achilles tendon in both long and short axis.  Area blood vessels noted. The probe was placed in a oblique-sagittal axis to the right lower leg.  1.5 ml's of 1% lidocaine were injected prior to the procedure for local anesthetic.  A 1.5 inch 22 gauge needle was placed under ultrasound guidance into the right achilles tendon.   A solution with total of volume of 5 cc PRP was injected into the right achilles tendon body in both long and short axis taking care to distribute the volume throughout all areas.  There was ultrasound documentation of needle placement and injection.        Impression:  Successful right achilles tendon PRP US-guided injection.      Plan:  Follow up prn   Will provide update in 1-2 weeks, plan follow up appointment in 6 weeks.  Discussed potential next steps based on clinical progress  Advised will likely be sore over next 2-14 days, OTC and supportive care reviewed  Breakthrough pain medication provided, will use only if needed  All questions were answered today  Contact us with additional questions or  concerns  Signs and sx of concern reviewed        Robert Alvarenga DO, CAORESTES  Primary Care Sports Medicine  [facility]

## 2024-12-09 DIAGNOSIS — E78.5 HYPERLIPIDEMIA LDL GOAL <100: ICD-10-CM

## 2024-12-09 RX ORDER — SIMVASTATIN 20 MG
20 TABLET ORAL AT BEDTIME
Qty: 90 TABLET | Refills: 1 | Status: SHIPPED | OUTPATIENT
Start: 2024-12-09

## 2025-01-12 ENCOUNTER — MYC REFILL (OUTPATIENT)
Dept: FAMILY MEDICINE | Facility: CLINIC | Age: 60
End: 2025-01-12
Payer: COMMERCIAL

## 2025-01-12 DIAGNOSIS — F41.1 GAD (GENERALIZED ANXIETY DISORDER): ICD-10-CM

## 2025-01-25 ENCOUNTER — HEALTH MAINTENANCE LETTER (OUTPATIENT)
Age: 60
End: 2025-01-25

## 2025-02-05 ENCOUNTER — MYC MEDICAL ADVICE (OUTPATIENT)
Dept: SLEEP MEDICINE | Facility: CLINIC | Age: 60
End: 2025-02-05
Payer: COMMERCIAL

## 2025-04-09 ENCOUNTER — OFFICE VISIT (OUTPATIENT)
Dept: FAMILY MEDICINE | Facility: CLINIC | Age: 60
End: 2025-04-09
Payer: COMMERCIAL

## 2025-04-09 ENCOUNTER — ORDERS ONLY (AUTO-RELEASED) (OUTPATIENT)
Dept: FAMILY MEDICINE | Facility: CLINIC | Age: 60
End: 2025-04-09

## 2025-04-09 VITALS
WEIGHT: 235.8 LBS | OXYGEN SATURATION: 96 % | SYSTOLIC BLOOD PRESSURE: 123 MMHG | BODY MASS INDEX: 33.76 KG/M2 | HEIGHT: 70 IN | TEMPERATURE: 97.8 F | DIASTOLIC BLOOD PRESSURE: 86 MMHG | RESPIRATION RATE: 18 BRPM | HEART RATE: 87 BPM

## 2025-04-09 DIAGNOSIS — Z12.11 SCREEN FOR COLON CANCER: ICD-10-CM

## 2025-04-09 DIAGNOSIS — F41.1 GAD (GENERALIZED ANXIETY DISORDER): ICD-10-CM

## 2025-04-09 DIAGNOSIS — R73.09 HIGH GLUCOSE: ICD-10-CM

## 2025-04-09 DIAGNOSIS — E78.5 HYPERLIPIDEMIA LDL GOAL <100: Primary | ICD-10-CM

## 2025-04-09 LAB
EST. AVERAGE GLUCOSE BLD GHB EST-MCNC: 131 MG/DL
HBA1C MFR BLD: 6.2 % (ref 0–5.6)

## 2025-04-09 PROCEDURE — 80061 LIPID PANEL: CPT | Performed by: FAMILY MEDICINE

## 2025-04-09 PROCEDURE — 80053 COMPREHEN METABOLIC PANEL: CPT | Performed by: FAMILY MEDICINE

## 2025-04-09 PROCEDURE — 3079F DIAST BP 80-89 MM HG: CPT | Performed by: FAMILY MEDICINE

## 2025-04-09 PROCEDURE — 3074F SYST BP LT 130 MM HG: CPT | Performed by: FAMILY MEDICINE

## 2025-04-09 PROCEDURE — 83036 HEMOGLOBIN GLYCOSYLATED A1C: CPT | Performed by: FAMILY MEDICINE

## 2025-04-09 PROCEDURE — 99214 OFFICE O/P EST MOD 30 MIN: CPT | Performed by: FAMILY MEDICINE

## 2025-04-09 PROCEDURE — 1126F AMNT PAIN NOTED NONE PRSNT: CPT | Performed by: FAMILY MEDICINE

## 2025-04-09 PROCEDURE — 36415 COLL VENOUS BLD VENIPUNCTURE: CPT | Performed by: FAMILY MEDICINE

## 2025-04-09 ASSESSMENT — ANXIETY QUESTIONNAIRES
GAD7 TOTAL SCORE: 2
4. TROUBLE RELAXING: NOT AT ALL
8. IF YOU CHECKED OFF ANY PROBLEMS, HOW DIFFICULT HAVE THESE MADE IT FOR YOU TO DO YOUR WORK, TAKE CARE OF THINGS AT HOME, OR GET ALONG WITH OTHER PEOPLE?: SOMEWHAT DIFFICULT
5. BEING SO RESTLESS THAT IT IS HARD TO SIT STILL: NOT AT ALL
1. FEELING NERVOUS, ANXIOUS, OR ON EDGE: NOT AT ALL
3. WORRYING TOO MUCH ABOUT DIFFERENT THINGS: SEVERAL DAYS
6. BECOMING EASILY ANNOYED OR IRRITABLE: SEVERAL DAYS
7. FEELING AFRAID AS IF SOMETHING AWFUL MIGHT HAPPEN: NOT AT ALL
2. NOT BEING ABLE TO STOP OR CONTROL WORRYING: NOT AT ALL
IF YOU CHECKED OFF ANY PROBLEMS ON THIS QUESTIONNAIRE, HOW DIFFICULT HAVE THESE PROBLEMS MADE IT FOR YOU TO DO YOUR WORK, TAKE CARE OF THINGS AT HOME, OR GET ALONG WITH OTHER PEOPLE: SOMEWHAT DIFFICULT
GAD7 TOTAL SCORE: 2

## 2025-04-09 ASSESSMENT — PAIN SCALES - GENERAL: PAINLEVEL_OUTOF10: NO PAIN (0)

## 2025-04-09 NOTE — PROGRESS NOTES
ICD-10-CM    1. Hyperlipidemia LDL goal <100  E78.5 Lipid panel reflex to direct LDL Non-fasting     Comprehensive metabolic panel (BMP + Alb, Alk Phos, ALT, AST, Total. Bili, TP)      2. Screen for colon cancer  Z12.11 COLOGUARD([employer])      3. High glucose  R73.09 Hemoglobin A1c     Comprehensive metabolic panel (BMP + Alb, Alk Phos, ALT, AST, Total. Bili, TP)       PLAN:await blood work   Stop the pepsi     Subjective   Pat is a 59 year old, presenting for the following health issues:  Recheck Medication        4/9/2025     3:14 PM   Additional Questions   Roomed by Reena KEYES CMA   Accompanied by RY     History of Present Illness       Mental Health Follow-up:  Patient presents to follow-up on Anxiety.    Patient's anxiety since last visit has been:  Good  The patient is not having other symptoms associated with anxiety.  Any significant life events: No  Patient is not feeling anxious or having panic attacks.  Patient has no concerns about alcohol or drug use.    He eats 0-1 servings of fruits and vegetables daily.He consumes 2 sweetened beverage(s) daily.He exercises with enough effort to increase his heart rate 10 to 19 minutes per day.  He exercises with enough effort to increase his heart rate 3 or less days per week. He is missing 1 dose(s) of medications per week.  He is not taking prescribed medications regularly due to remembering to take.      SUBJECTIVE:  59 year old enters for recheck of high cholesterol.  Pt. Has been taking med and has no side effects. Pt is following diet.  Denies chest pain and SOB.  Past Medical History:   Diagnosis Date    Arthritis     Mother and Father    Chronic sinusitis     Polyposis    Depressive disorder Long time ago    Hyperlipidemia     Increased BMI     Psoriasis      Past Surgical History:   Procedure Laterality Date    ARTHROSCOPY KNEE Right 07/14/2017    Procedure: ARTHROSCOPY KNEE;  Arthroscopic Menisectomy Right Knee;  Surgeon: Jim Lemon,  "MD;  Location: [facility]    COLONOSCOPY N/A 03/09/2015    Procedure: COLONOSCOPY;  Surgeon: Denys Archuleta MD;  Location: [facility]    COLONOSCOPY WITH CO2 INSUFFLATION N/A 03/09/2015    Procedure: COLONOSCOPY WITH CO2 INSUFFLATION;  Surgeon: Denys Archuleta MD;  Location: [facility]    ENT SURGERY      polyps from nasal passage    EYE SURGERY  11/15/2023    HC SHLDR ARTHROSCOP,PART ACROMIOPLAS  2000    SHOULDER SURGERY      SURGICAL HISTORY OF -   1991    Chronic Lt Shoulder Dislocation    TONSILLECTOMY         Current Outpatient Medications:     FLUoxetine (PROZAC) 20 MG capsule, Take 1 capsule (20 mg) by mouth daily., Disp: 90 capsule, Rfl: 0    simvastatin (ZOCOR) 20 MG tablet, TAKE 1 TABLET BY MOUTH AT BEDTIME, Disp: 90 tablet, Rfl: 1  Reviewed health maintenance   Patient Active Problem List   Diagnosis    CARDIOVASCULAR SCREENING; LDL GOAL LESS THAN 160    Generalized anxiety disorder    ALLAN (obstructive sleep apnea)    Class 1 obesity due to excess calories without serious comorbidity with body mass index (BMI) of 32.0 to 32.9 in adult       OBJECTIVE:  no apparent distress  /86   Pulse 87   Temp 97.8  F (36.6  C) (Tympanic)   Resp 18   Ht 1.778 m (5' 10\")   Wt 107 kg (235 lb 12.8 oz)   SpO2 96%   BMI 33.83 kg/m        Exam:  Constitutional: healthy, alert and no distress  Head: Normocephalic. No masses, lesions, tenderness or abnormalities  Neck: Neck supple. No adenopathy. Thyroid symmetric, normal size,  Cardiovascular: negative, PMI normal. No lifts, heaves, or thrills. RRR. No murmurs, clicks gallops or rub  Respiratory: negative Lungs clear    Office Visit on 12/20/2023   Component Date Value Ref Range Status    Cholesterol 12/20/2023 280 (H)  <200 mg/dL Final    Triglycerides 12/20/2023 412 (H)  <150 mg/dL Final    Direct Measure HDL 12/20/2023 40  >=40 mg/dL Final    LDL Cholesterol Calculated 12/20/2023    Final    Cannot estimate LDL when triglyceride exceeds 400 mg/dL    Non HDL " Cholesterol 12/20/2023 240 (H)  <130 mg/dL Final    Patient Fasting > 8hrs? 12/20/2023 No   Final    Sodium 12/20/2023 142  135 - 145 mmol/L Final    Reference intervals for this test were updated on 09/26/2023 to more accurately reflect our healthy population. There may be differences in the flagging of prior results with similar values performed with this method. Interpretation of those prior results can be made in the context of the updated reference intervals.     Potassium 12/20/2023 4.5  3.4 - 5.3 mmol/L Final    Chloride 12/20/2023 104  98 - 107 mmol/L Final    Carbon Dioxide (CO2) 12/20/2023 29  22 - 29 mmol/L Final    Anion Gap 12/20/2023 9  7 - 15 mmol/L Final    Urea Nitrogen 12/20/2023 21.2  8.0 - 23.0 mg/dL Final    Creatinine 12/20/2023 1.22 (H)  0.67 - 1.17 mg/dL Final    GFR Estimate 12/20/2023 68  >60 mL/min/1.73m2 Final    Calcium 12/20/2023 9.8  8.8 - 10.2 mg/dL Final    Glucose 12/20/2023 90  70 - 99 mg/dL Final    Hemoglobin A1C 12/20/2023 6.2 (H)  0.0 - 5.6 % Final    Normal <5.7%   Prediabetes 5.7-6.4%    Diabetes 6.5% or higher     Note: Adopted from ADA consensus guidelines.    LDL Cholesterol Direct 12/20/2023 186 (H)  <100 mg/dL Final    Age 2-19 years:  Desirable: 0-110 mg/dL   Borderline high: 110-129 mg/dL   High: >= 130 mg/dL    Age 20 years and older:  Desirable: <100mg/dL  Above desirable: 100-129 mg/dL   Borderline high: 130-159 mg/dL   High: 160-189 mg/dL   Very high: >= 190 mg/dL     SUBJECTIVE:  SUBJECTIVE:  59 year old.The patient has a history of  follow-up of depressive symptoms.    Since last visit the patient has anxiety.  Notes from that visit reviewed. PHQ-9 has been reviewed.  Current symptoms include: Anxiety   Symptoms that have subjectively improved include: Anxiety  Previous and current treatment modalities employed include: Medications   Prozac (Fluoxetine)  Patient Active Problem List   Diagnosis    CARDIOVASCULAR SCREENING; LDL GOAL LESS THAN 160    Generalized  "anxiety disorder    ALLAN (obstructive sleep apnea)    Class 1 obesity due to excess calories without serious comorbidity with body mass index (BMI) of 32.0 to 32.9 in adult      Reviewed health maintenance  OBJECTIVE:  no apparent distress  /86   Pulse 87   Temp 97.8  F (36.6  C) (Tympanic)   Resp 18   Ht 1.778 m (5' 10\")   Wt 107 kg (235 lb 12.8 oz)   SpO2 96%   BMI 33.83 kg/m         MENTAL STATUS EXAM:   1. Clinical observations:Patient was clean and was adequately groomed. Patient's emotional presentation was cooperative and sima/open. Patient spoke clearly and articulately. Patient maintained adequate eye contact and was cooperative in answering questions.  2. Patient appeared to be well-oriented in all spheres with coherent, logical, goal directed and relevent thinking.  3. Thought content: Denies auditory and visual hallucinations or delusions.  4. Affect and mood: Affect is alert and her emotional attitude is described as normal.  5. Sensorium and cognition: Patient was in contact with reality and oriented to place, time, person and situation. patient demonstrated no impairment in immediate, recent, or remote memory. patient's insight was adequate without obvious deficits in intelligence.    :         "

## 2025-04-10 LAB
ALBUMIN SERPL BCG-MCNC: 4.5 G/DL (ref 3.5–5.2)
ALP SERPL-CCNC: 60 U/L (ref 40–150)
ALT SERPL W P-5'-P-CCNC: 25 U/L (ref 0–70)
ANION GAP SERPL CALCULATED.3IONS-SCNC: 12 MMOL/L (ref 7–15)
AST SERPL W P-5'-P-CCNC: 33 U/L (ref 0–45)
BILIRUB SERPL-MCNC: 0.5 MG/DL
BUN SERPL-MCNC: 15.7 MG/DL (ref 8–23)
CALCIUM SERPL-MCNC: 9.6 MG/DL (ref 8.8–10.4)
CHLORIDE SERPL-SCNC: 103 MMOL/L (ref 98–107)
CHOLEST SERPL-MCNC: 209 MG/DL
CREAT SERPL-MCNC: 1.1 MG/DL (ref 0.67–1.17)
EGFRCR SERPLBLD CKD-EPI 2021: 76 ML/MIN/1.73M2
FASTING STATUS PATIENT QL REPORTED: NO
FASTING STATUS PATIENT QL REPORTED: NO
GLUCOSE SERPL-MCNC: 172 MG/DL (ref 70–99)
HCO3 SERPL-SCNC: 25 MMOL/L (ref 22–29)
HDLC SERPL-MCNC: 40 MG/DL
LDLC SERPL CALC-MCNC: 129 MG/DL
NONHDLC SERPL-MCNC: 169 MG/DL
POTASSIUM SERPL-SCNC: 4.3 MMOL/L (ref 3.4–5.3)
PROT SERPL-MCNC: 7.2 G/DL (ref 6.4–8.3)
SODIUM SERPL-SCNC: 140 MMOL/L (ref 135–145)
TRIGL SERPL-MCNC: 201 MG/DL

## 2025-04-10 RX ORDER — SIMVASTATIN 20 MG
20 TABLET ORAL AT BEDTIME
Qty: 90 TABLET | Refills: 3 | Status: SHIPPED | OUTPATIENT
Start: 2025-04-10

## 2025-04-29 LAB — NONINV COLON CA DNA+OCC BLD SCRN STL QL: NEGATIVE

## 2025-07-08 SDOH — HEALTH STABILITY: PHYSICAL HEALTH: ON AVERAGE, HOW MANY DAYS PER WEEK DO YOU ENGAGE IN MODERATE TO STRENUOUS EXERCISE (LIKE A BRISK WALK)?: 3 DAYS

## 2025-07-09 ENCOUNTER — OFFICE VISIT (OUTPATIENT)
Dept: FAMILY MEDICINE | Facility: CLINIC | Age: 60
End: 2025-07-09
Payer: COMMERCIAL

## 2025-07-09 ENCOUNTER — OFFICE VISIT (OUTPATIENT)
Dept: ORTHOPEDICS | Facility: CLINIC | Age: 60
End: 2025-07-09
Payer: COMMERCIAL

## 2025-07-09 VITALS
HEIGHT: 71 IN | SYSTOLIC BLOOD PRESSURE: 112 MMHG | TEMPERATURE: 97.6 F | HEART RATE: 69 BPM | OXYGEN SATURATION: 99 % | WEIGHT: 217 LBS | RESPIRATION RATE: 16 BRPM | BODY MASS INDEX: 30.38 KG/M2 | DIASTOLIC BLOOD PRESSURE: 73 MMHG

## 2025-07-09 DIAGNOSIS — B02.9 HERPES ZOSTER WITHOUT COMPLICATION: ICD-10-CM

## 2025-07-09 DIAGNOSIS — M76.62 ACHILLES TENDINITIS OF BOTH LOWER EXTREMITIES: Primary | ICD-10-CM

## 2025-07-09 DIAGNOSIS — R73.09 HIGH GLUCOSE: Primary | ICD-10-CM

## 2025-07-09 DIAGNOSIS — M76.61 ACHILLES TENDINITIS OF BOTH LOWER EXTREMITIES: Primary | ICD-10-CM

## 2025-07-09 DIAGNOSIS — M62.89 MUSCLE TIGHTNESS: ICD-10-CM

## 2025-07-09 DIAGNOSIS — R26.9 IMPAIRED GAIT: ICD-10-CM

## 2025-07-09 LAB
EST. AVERAGE GLUCOSE BLD GHB EST-MCNC: 128 MG/DL
HBA1C MFR BLD: 6.1 % (ref 0–5.6)

## 2025-07-09 PROCEDURE — 3074F SYST BP LT 130 MM HG: CPT | Performed by: FAMILY MEDICINE

## 2025-07-09 PROCEDURE — 3044F HG A1C LEVEL LT 7.0%: CPT | Performed by: FAMILY MEDICINE

## 2025-07-09 PROCEDURE — G2211 COMPLEX E/M VISIT ADD ON: HCPCS | Performed by: FAMILY MEDICINE

## 2025-07-09 PROCEDURE — 3078F DIAST BP <80 MM HG: CPT | Performed by: FAMILY MEDICINE

## 2025-07-09 PROCEDURE — 83036 HEMOGLOBIN GLYCOSYLATED A1C: CPT | Performed by: FAMILY MEDICINE

## 2025-07-09 PROCEDURE — 99214 OFFICE O/P EST MOD 30 MIN: CPT | Performed by: FAMILY MEDICINE

## 2025-07-09 PROCEDURE — 1126F AMNT PAIN NOTED NONE PRSNT: CPT | Performed by: FAMILY MEDICINE

## 2025-07-09 PROCEDURE — 36415 COLL VENOUS BLD VENIPUNCTURE: CPT | Performed by: FAMILY MEDICINE

## 2025-07-09 RX ORDER — VALACYCLOVIR HYDROCHLORIDE 1 G/1
1000 TABLET, FILM COATED ORAL 3 TIMES DAILY
Qty: 21 TABLET | Refills: 0 | Status: SHIPPED | OUTPATIENT
Start: 2025-07-09 | End: 2025-07-16

## 2025-07-09 ASSESSMENT — PAIN SCALES - GENERAL: PAINLEVEL_OUTOF10: NO PAIN (0)

## 2025-07-09 NOTE — PROGRESS NOTES
"    ICD-10-CM    1. High glucose  R73.09 Hemoglobin A1c     Hemoglobin A1c     CANCELED: Hemoglobin A1c      2. Herpes zoster without complication  B02.9 valACYclovir (VALTREX) 1000 mg tablet       PLAN:a1c 6-12 months  If rash on right arm and left chest subside with valtrex consider daily.     Subjective   Pat is a 59 year old, presenting for the following health issues:  Derm Problem    History of Present Illness       Reason for visit:  Rash on right arm, I think it might be Shingles  Symptoms include:  Rash, itchy, pods opens up,  Symptom progression:  Worsening  Had these symptoms before:  Yes  Has tried/received treatment for these symptoms:  No He is missing 1 dose(s) of medications per week.  He is not taking prescribed medications regularly due to remembering to take.        SUBJECTIVE:  59 year old.The patient has a complaint of vesicular lesions.  This started  months ago. Location right arm and left upper back. quality itches  Associated symptoms are blisters that dry up .  Brought on by unknown .  Better with nothing  Has stopped his sugar pop and lost weight.   Lab Results   Component Value Date    A1C 6.1 07/09/2025    A1C 6.2 04/09/2025    A1C 6.2 12/20/2023    .  Reviewed health maintenance  Patient Active Problem List   Diagnosis    CARDIOVASCULAR SCREENING; LDL GOAL LESS THAN 160    Generalized anxiety disorder    ALLAN (obstructive sleep apnea)    Class 1 obesity due to excess calories without serious comorbidity with body mass index (BMI) of 32.0 to 32.9 in adult     Past Medical History:   Diagnosis Date    Arthritis     Mother and Father    Chronic sinusitis     Polyposis    Depressive disorder Long time ago    Hyperlipidemia     Increased BMI     Psoriasis        OBJECTIVE:  no apparent distress  /73   Pulse 69   Temp 97.6  F (36.4  C) (Tympanic)   Resp 16   Ht 1.803 m (5' 11\")   Wt 98.4 kg (217 lb)   SpO2 99%   BMI 30.27 kg/m    Small vesicular lesions right arm and forarm and " left mid lateral back      Signed Electronically by: Red Trejo MD

## 2025-07-09 NOTE — PROGRESS NOTES
Luisito Johnston  :  1965  DOS: 25  MRN: [id number]    Sports Medicine Clinic Visit    PCP: Red Trejo    Luisito Johnston is a 59 year old male who is seen in consultation at the request of  Robert Bullock D.O. presenting with chronic right achilles tendon pain.    Injury: Gradual onset of pain over the past 1+ years.  Pain located over right achilles tendon, achilles insertion, nonradiating.  Additional Features:  Positive: swelling and painful weight bearing.  Symptoms are better with Ice, Ibuprofen, and Rest.  Symptoms are worse with: prolonged standing/walking, ankle dorsiflexion, raising up on toes.  Other evaluation and/or treatments so far consists of: Ice, Heat, Tylenol, Ibuprofen, Rest, and HEP, stretching, alternative footwear.  Prior imaging: MRI completed 23.  Prior History of related problems: none    Social History: currently employed as sales    Interim History - 2025  Since last visit on 2024 patient has bilateral heel, achilles tendon, left>>>right.  Patient notes gradual onset of left achilles pain over the past 2 - 3 months, mostly with walking.  Right achilles tendon PRP injection completed on 2024 provided gradual relief over 3 - 4 months, only having mild discomfort at this time.  No prior history of of left achilles tendon pain.  Patient would like updated disability parking permit.  No new injury in the interim.    Review of Systems  Musculoskeletal: as above  Remainder of review of systems is negative including constitutional, CV, pulmonary, GI, Skin and Neurologic except as noted in HPI or medical history.    Past Medical History:   Diagnosis Date    Arthritis     Mother and Father    Chronic sinusitis     Polyposis    Depressive disorder Long time ago    Hyperlipidemia     Increased BMI     Psoriasis      Past Surgical History:   Procedure Laterality Date    ARTHROSCOPY KNEE Right 2017    Procedure: ARTHROSCOPY KNEE;  Arthroscopic  Menisectomy Right Knee;  Surgeon: Jim Lemon MD;  Location: [facility]    COLONOSCOPY N/A 03/09/2015    Procedure: COLONOSCOPY;  Surgeon: Denys Archuleta MD;  Location: [facility]    COLONOSCOPY WITH CO2 INSUFFLATION N/A 03/09/2015    Procedure: COLONOSCOPY WITH CO2 INSUFFLATION;  Surgeon: Denys Archuleta MD;  Location: [facility]    ENT SURGERY      polyps from nasal passage    EYE SURGERY  11/15/2023    HC SHLDR ARTHROSCOP,PART ACROMIOPLAS  2000    SHOULDER SURGERY      SURGICAL HISTORY OF -   1991    Chronic Lt Shoulder Dislocation    TONSILLECTOMY       Family History   Problem Relation Age of Onset    Respiratory Mother     Diabetes Father     Sleep Apnea Sister     Sleep Apnea Sister     Anxiety Disorder Sister         Both sisters       Objective    General: healthy, alert and in no distress    HEENT: no scleral icterus or conjunctival erythema   Skin: no suspicious lesions or rash. No jaundice.   CV: regular rhythm by palpation, 2+ distal pulses, no pedal edema    Resp: normal respiratory effort without conversational dyspnea   Psych: normal mood and affect    Gait: mildly antalgic, appropriate coordination and balance   Neuro: normal light touch sensory exam of the extremities. Motor strength as noted below     Bilateral Ankle/Foot Exam:    Inspection:       no visible ecchymosis       edema noted midsubstance achilles, R>L    Foot inspection:       no deformity noted    ROM:        full ROM with dorsiflexion, plantarflexion, inversion and eversion    Tender:       Midsubstance achilles L>>R, minimal left    Non-Tender:       remainder of foot and ankle    Skin:       well perfused       capillary refill less than 2 seconds    Radiology:  EXAM: MR ANKLE RIGHT W/O CONTRAST  LOCATION: [facility]  DATE/TIME: 5/11/2023 4:49 PM CDT     INDICATION: Right Achilles tenderness and swelling.  COMPARISON: None.  TECHNIQUE: Unenhanced.     FINDINGS:      TENDONS:   -Peroneal: Peroneus  longus and brevis tendons are intact. No tendinopathy or tenosynovitis. No subluxation.  -Medial: Posterior tibialis tendon is intact. No tendinopathy or tenosynovitis. Flexor digitorum longus and flexor hallucis longus tendons are normal. No tenosynovitis.  -Anterior: Anterior tibialis, extensor hallucis longus, and extensor digitorum longus tendons are normal. No tenosynovitis.  -Achilles: Moderate fusiform dilatation of the midportion of the Achilles tendon compatible with tendinopathy without significant superimposed tearing. These changes correspond to the area of maximal discomfort as delineated by a soft tissue marker.   There is mild adjacent paratenonitis.     LIGAMENTS:   -Anterior talofibular ligament: Intact.   -Calcaneofibular ligament: Intact.   -Posterior talofibular ligament: Intact.  -Syndesmotic inferior tibiofibular ligaments: Intact.  -Deltoid ligament complex: Intact.  -Spring ligament complex: Intact.     JOINTS AND BONES:   -No fracture, bone contusion or osteochondral lesion. Normal articular cartilage. No joint effusion or synovitis.     SOFT TISSUES:  -Plantar fascia: Intact. No acute fasciitis or tear.  -Sinus tarsi and tarsal tunnel: Normal.  -Muscles: Normal.                                                                      IMPRESSION:  1.  Moderate noninsertional tendinosis of the Achilles tendon with mild adjacent paratenonitis. No superimposed tearing.     2.  Otherwise normal MRI of the right ankle.        Assessment:  1. Achilles tendinitis of both lower extremities      Plan:  Discussed the assessment with the patient.  Follow up: 1-2 mo prn f not improving, sooner if needed if worsening  Continue to follow up with me for further treatment and recommendations  Had tried significant conservative care for the right achilles including HEP, activity modification, bracing, PT, massage, OTC medications without significant or lasting relief, has had good relief on the right s/p PRP  injection last year  Now having increased pain in the left achilles, similar clinical picture  Reviewed PRP and US guided percutaneous tenotomy in detail today as future options  After discussion, he would like to trial night splint, HEP and QID voltaren gel  Can provide walking boot as well as crutches if needed in the future  Reviewed expectations, goals and relative risks of PRP, he expressed understanding  Prior right achilles MR images independently visualized and reviewed with patient today in clinic  Home handouts provided and supportive care reviewed  All questions were answered today  Contact us with additional questions or concerns  Signs and sx of concern reviewed      Robert Alvarenga DO, ANGELES  Sports Medicine Physician  [facility]      Time spent in chart review, one-on-one evaluation, discussion with patient regarding: nature of problem, clinical course, prior treatments, therapeutic options, shared-decision making, potential procedures and referrals, and charting related to the visit: 32 minutes.  If applicable, time does not include time spent performing any procedure.

## 2025-07-09 NOTE — LETTER
2025      Luisito Johnston  [address]  [place] [address]      Dear Colleague,    Thank you for referring your patient, Luisito Johnston, to the [facility]. Please see a copy of my visit note below.    Luisito Johnston  :  1965  DOS: 25  MRN: [id number]    Sports Medicine Clinic Visit    PCP: Red Trejo    Luisito Johnston is a 59 year old male who is seen in consultation at the request of  Robert Bullock D.O. presenting with chronic right achilles tendon pain.    Injury: Gradual onset of pain over the past 1+ years.  Pain located over right achilles tendon, achilles insertion, nonradiating.  Additional Features:  Positive: swelling and painful weight bearing.  Symptoms are better with Ice, Ibuprofen, and Rest.  Symptoms are worse with: prolonged standing/walking, ankle dorsiflexion, raising up on toes.  Other evaluation and/or treatments so far consists of: Ice, Heat, Tylenol, Ibuprofen, Rest, and HEP, stretching, alternative footwear.  Prior imaging: MRI completed 23.  Prior History of related problems: none    Social History: currently employed as sales    Interim History - 2025  Since last visit on 2024 patient has bilateral heel, achilles tendon, left>>>right.  Patient notes gradual onset of left achilles pain over the past 2 - 3 months, mostly with walking.  Right achilles tendon PRP injection completed on 2024 provided gradual relief over 3 - 4 months, only having mild discomfort at this time.  No prior history of of left achilles tendon pain.  Patient would like updated disability parking permit.  No new injury in the interim.    Review of Systems  Musculoskeletal: as above  Remainder of review of systems is negative including constitutional, CV, pulmonary, GI, Skin and Neurologic except as noted in HPI or medical history.    Past Medical History:   Diagnosis Date     Arthritis     Mother and Father      Chronic sinusitis     Polyposis     Depressive disorder Long time ago     Hyperlipidemia      Increased BMI      Psoriasis      Past Surgical History:   Procedure Laterality Date     ARTHROSCOPY KNEE Right 07/14/2017    Procedure: ARTHROSCOPY KNEE;  Arthroscopic Menisectomy Right Knee;  Surgeon: Jim Lemon MD;  Location: [facility]     COLONOSCOPY N/A 03/09/2015    Procedure: COLONOSCOPY;  Surgeon: Denys Archuleta MD;  Location: [facility]     COLONOSCOPY WITH CO2 INSUFFLATION N/A 03/09/2015    Procedure: COLONOSCOPY WITH CO2 INSUFFLATION;  Surgeon: Denys Archuleta MD;  Location: [facility]     ENT SURGERY      polyps from nasal passage     EYE SURGERY  11/15/2023      SHLDR ARTHROSCOP,PART ACROMIOPLAS  2000     SHOULDER SURGERY       SURGICAL HISTORY OF -   1991    Chronic Lt Shoulder Dislocation     TONSILLECTOMY       Family History   Problem Relation Age of Onset     Respiratory Mother      Diabetes Father      Sleep Apnea Sister      Sleep Apnea Sister      Anxiety Disorder Sister         Both sisters       Objective    General: healthy, alert and in no distress    HEENT: no scleral icterus or conjunctival erythema   Skin: no suspicious lesions or rash. No jaundice.   CV: regular rhythm by palpation, 2+ distal pulses, no pedal edema    Resp: normal respiratory effort without conversational dyspnea   Psych: normal mood and affect    Gait: mildly antalgic, appropriate coordination and balance   Neuro: normal light touch sensory exam of the extremities. Motor strength as noted below     Bilateral Ankle/Foot Exam:    Inspection:       no visible ecchymosis       edema noted midsubstance achilles, R>L    Foot inspection:       no deformity noted    ROM:        full ROM with dorsiflexion, plantarflexion, inversion and eversion    Tender:       Midsubstance achilles L>>R, minimal left    Non-Tender:       remainder of foot and ankle    Skin:       well perfused       capillary refill less than 2  seconds    Radiology:  EXAM: MR ANKLE RIGHT W/O CONTRAST  LOCATION: [facility]  DATE/TIME: 5/11/2023 4:49 PM CDT     INDICATION: Right Achilles tenderness and swelling.  COMPARISON: None.  TECHNIQUE: Unenhanced.     FINDINGS:      TENDONS:   -Peroneal: Peroneus longus and brevis tendons are intact. No tendinopathy or tenosynovitis. No subluxation.  -Medial: Posterior tibialis tendon is intact. No tendinopathy or tenosynovitis. Flexor digitorum longus and flexor hallucis longus tendons are normal. No tenosynovitis.  -Anterior: Anterior tibialis, extensor hallucis longus, and extensor digitorum longus tendons are normal. No tenosynovitis.  -Achilles: Moderate fusiform dilatation of the midportion of the Achilles tendon compatible with tendinopathy without significant superimposed tearing. These changes correspond to the area of maximal discomfort as delineated by a soft tissue marker.   There is mild adjacent paratenonitis.     LIGAMENTS:   -Anterior talofibular ligament: Intact.   -Calcaneofibular ligament: Intact.   -Posterior talofibular ligament: Intact.  -Syndesmotic inferior tibiofibular ligaments: Intact.  -Deltoid ligament complex: Intact.  -Spring ligament complex: Intact.     JOINTS AND BONES:   -No fracture, bone contusion or osteochondral lesion. Normal articular cartilage. No joint effusion or synovitis.     SOFT TISSUES:  -Plantar fascia: Intact. No acute fasciitis or tear.  -Sinus tarsi and tarsal tunnel: Normal.  -Muscles: Normal.                                                                      IMPRESSION:  1.  Moderate noninsertional tendinosis of the Achilles tendon with mild adjacent paratenonitis. No superimposed tearing.     2.  Otherwise normal MRI of the right ankle.        Assessment:  1. Achilles tendinitis of both lower extremities      Plan:  Discussed the assessment with the patient.  Follow up: 1-2 mo prn f not improving, sooner if needed if worsening  Continue to  follow up with me for further treatment and recommendations  Had tried significant conservative care for the right achilles including HEP, activity modification, bracing, PT, massage, OTC medications without significant or lasting relief, has had good relief on the right s/p PRP injection last year  Now having increased pain in the left achilles, similar clinical picture  Reviewed PRP and US guided percutaneous tenotomy in detail today as future options  After discussion, he would like to trial night splint, HEP and QID voltaren gel  Can provide walking boot as well as crutches if needed in the future  Reviewed expectations, goals and relative risks of PRP, he expressed understanding  Prior right achilles MR images independently visualized and reviewed with patient today in clinic  Home handouts provided and supportive care reviewed  All questions were answered today  Contact us with additional questions or concerns  Signs and sx of concern reviewed      Robert Alvarenga DO, ANGELES  Sports Medicine Physician  [facility]      Time spent in chart review, one-on-one evaluation, discussion with patient regarding: nature of problem, clinical course, prior treatments, therapeutic options, shared-decision making, potential procedures and referrals, and charting related to the visit: 32 minutes.  If applicable, time does not include time spent performing any procedure.        Again, thank you for allowing me to participate in the care of your patient.        Sincerely,        Robert Alvarenga DO    Electronically signed

## 2025-07-14 ENCOUNTER — TELEPHONE (OUTPATIENT)
Dept: FAMILY MEDICINE | Facility: CLINIC | Age: 60
End: 2025-07-14
Payer: COMMERCIAL

## 2025-07-14 ENCOUNTER — ANCILLARY PROCEDURE (OUTPATIENT)
Dept: GENERAL RADIOLOGY | Facility: CLINIC | Age: 60
End: 2025-07-14
Attending: STUDENT IN AN ORGANIZED HEALTH CARE EDUCATION/TRAINING PROGRAM
Payer: COMMERCIAL

## 2025-07-14 ENCOUNTER — OFFICE VISIT (OUTPATIENT)
Dept: ORTHOPEDICS | Facility: CLINIC | Age: 60
End: 2025-07-14
Payer: COMMERCIAL

## 2025-07-14 DIAGNOSIS — S69.91XA HAND INJURY, RIGHT, INITIAL ENCOUNTER: ICD-10-CM

## 2025-07-14 DIAGNOSIS — S69.91XA HAND INJURY, RIGHT, INITIAL ENCOUNTER: Primary | ICD-10-CM

## 2025-07-14 PROCEDURE — 73130 X-RAY EXAM OF HAND: CPT | Mod: TC | Performed by: RADIOLOGY

## 2025-07-14 PROCEDURE — 99214 OFFICE O/P EST MOD 30 MIN: CPT | Performed by: STUDENT IN AN ORGANIZED HEALTH CARE EDUCATION/TRAINING PROGRAM
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
## 2025-07-14 NOTE — PATIENT INSTRUCTIONS
# Right hand injury:     Non-displaced fractures of the 4th and 5th metacarpals    - Activity: OTC Boxer's fracture brace to be worn except briefly for hygiene. No lifting with the hand otherwise  - Ice as needed   - Medications:      - ibuprofen 600mg three times daily as needed     - tylenol 1000mg three times daily as needed    Follow-up: In 2 weeks for repeat exam and xrays, sooner if worsening    Please call [phone] and ask for my team if you have any questions or concerns.
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
## (undated) DEVICE — Device

## (undated) DEVICE — DRSG ABDOMINAL 07 1/2X8" 7197D

## (undated) DEVICE — SOL WATER IRRIG 1000ML BOTTLE 07139-09

## (undated) DEVICE — GLOVE PROTEXIS POWDER FREE 8.0 ORTHOPEDIC 2D73ET80

## (undated) DEVICE — GLOVE PROTEXIS W/NEU-THERA 8.5  2D73TE85

## (undated) DEVICE — PACK ARTHROSCOPY KNEE SOP15AKFSM

## (undated) DEVICE — BNDG ELASTIC 6"X5YDS UNSTERILE 6611-60

## (undated) DEVICE — DRSG STERI STRIP 1/2X4" R1547

## (undated) DEVICE — SOL NACL 0.9% IRRIG 3000ML BAG 07972-08

## (undated) DEVICE — GOWN IMPERVIOUS BREATHABLE 2XL/XLONG

## (undated) DEVICE — GLOVE PROTEXIS W/NEU-THERA 7.5  2D73TE75